# Patient Record
Sex: FEMALE | Race: WHITE | NOT HISPANIC OR LATINO | Employment: OTHER | URBAN - METROPOLITAN AREA
[De-identification: names, ages, dates, MRNs, and addresses within clinical notes are randomized per-mention and may not be internally consistent; named-entity substitution may affect disease eponyms.]

---

## 2017-03-21 ENCOUNTER — PROCEDURE VISIT (OUTPATIENT)
Dept: OBSTETRICS AND GYNECOLOGY | Facility: CLINIC | Age: 67
End: 2017-03-21

## 2017-03-21 VITALS
DIASTOLIC BLOOD PRESSURE: 82 MMHG | SYSTOLIC BLOOD PRESSURE: 130 MMHG | HEIGHT: 62 IN | BODY MASS INDEX: 33.49 KG/M2 | WEIGHT: 182 LBS

## 2017-03-21 DIAGNOSIS — Z79.890 HORMONE REPLACEMENT THERAPY (POSTMENOPAUSAL): ICD-10-CM

## 2017-03-21 DIAGNOSIS — Z12.31 SCREENING MAMMOGRAM, ENCOUNTER FOR: ICD-10-CM

## 2017-03-21 DIAGNOSIS — Z01.419 WOMEN'S ANNUAL ROUTINE GYNECOLOGICAL EXAMINATION: Primary | ICD-10-CM

## 2017-03-21 DIAGNOSIS — E66.9 OBESITY (BMI 30.0-34.9): ICD-10-CM

## 2017-03-21 PROBLEM — Z90.710 HISTORY OF TOTAL ABDOMINAL HYSTERECTOMY AND BILATERAL SALPINGO-OOPHORECTOMY: Status: ACTIVE | Noted: 2017-03-21

## 2017-03-21 PROBLEM — Z90.79 HISTORY OF TOTAL ABDOMINAL HYSTERECTOMY AND BILATERAL SALPINGO-OOPHORECTOMY: Status: ACTIVE | Noted: 2017-03-21

## 2017-03-21 PROBLEM — Z90.722 HISTORY OF TOTAL ABDOMINAL HYSTERECTOMY AND BILATERAL SALPINGO-OOPHORECTOMY: Status: ACTIVE | Noted: 2017-03-21

## 2017-03-21 PROBLEM — E66.811 OBESITY (BMI 30.0-34.9): Status: ACTIVE | Noted: 2017-03-21

## 2017-03-21 PROCEDURE — G0101 CA SCREEN;PELVIC/BREAST EXAM: HCPCS | Performed by: OBSTETRICS & GYNECOLOGY

## 2017-03-21 RX ORDER — LOSARTAN POTASSIUM AND HYDROCHLOROTHIAZIDE 25; 100 MG/1; MG/1
1 TABLET ORAL DAILY
COMMUNITY

## 2017-03-21 RX ORDER — DILTIAZEM HYDROCHLORIDE 300 MG/1
300 CAPSULE, COATED, EXTENDED RELEASE ORAL DAILY
COMMUNITY

## 2017-03-21 RX ORDER — ATENOLOL 50 MG/1
50 TABLET ORAL DAILY
COMMUNITY

## 2017-03-21 RX ORDER — PRAVASTATIN SODIUM 40 MG
40 TABLET ORAL DAILY
COMMUNITY
End: 2017-11-29 | Stop reason: DRUGHIGH

## 2017-03-21 RX ORDER — SOLIFENACIN SUCCINATE 5 MG/1
5 TABLET, FILM COATED ORAL AS NEEDED
COMMUNITY
End: 2020-03-12

## 2017-03-21 RX ORDER — SULFAMETHOXAZOLE AND TRIMETHOPRIM 800; 160 MG/1; MG/1
1 TABLET ORAL 2 TIMES DAILY
COMMUNITY
End: 2018-06-05

## 2017-03-21 RX ORDER — RIZATRIPTAN BENZOATE 5 MG/1
5 TABLET, ORALLY DISINTEGRATING ORAL ONCE AS NEEDED
COMMUNITY

## 2017-03-21 RX ORDER — DESOXIMETASONE 2.5 MG/G
1 OINTMENT TOPICAL 2 TIMES DAILY
COMMUNITY
End: 2020-03-12

## 2017-03-21 RX ORDER — LANSOPRAZOLE 30 MG/1
30 CAPSULE, DELAYED RELEASE ORAL DAILY
COMMUNITY

## 2017-03-21 NOTE — PROGRESS NOTES
"Subjective     Chief Complaint   Patient presents with   • Gynecologic Exam     annual pap medicare       Maggie Emery is a 67 y.o. year old  presenting to be seen for her annual exam.      She is not sexually active.  In the past 12 months there have not been new sexual partners.  Condoms are not typically used.  She would not like to be screened for STD's at today's exam.     She exercises regularly: no.  She wears her seat belt: yes.  She has concerns about domestic violence: no.  She has noticed changes in height: no    GYN screening history:  · Last pap: she reports her last PAP was normal  · Last DEXA: she reports her last DEXA was normal.    No Additional Complaints Reported    The following portions of the patient's history were reviewed and updated as appropriate:vital signs, allergies, current medications, past medical history, past social history, past surgical history and problem list.    Review of Systems  Constitutional: negative for fever, chills, activity change, appetite change, fatigue and unexpected weight change.  Ears, nose, mouth, throat, and face: negative  Respiratory: negative  Cardiovascular: negative  Gastrointestinal: negative  Genitourinary:negative  Integument/breast: negative     Physical Exam    Objective     Visit Vitals   • /82   • Ht 61.5\" (156.2 cm)   • Wt 182 lb (82.6 kg)   • LMP  (Approximate)   • Breastfeeding No   • BMI 33.83 kg/m2       General:  well developed; well nourished  no acute distress  obese - Body mass index is 33.83 kg/(m^2).   Skin:  No suspicious lesions seen   Thyroid: normal to inspection and palpation   Lungs:  breathing is unlabored  clear to auscultation bilaterally   Heart:  regular rate and rhythm, S1, S2 normal, no murmur, click, rub or gallop   Breasts:  Examined in supine position  Symmetric without masses or skin dimpling  Nipples normal without inversion, lesions or discharge  There are no palpable axillary nodes   Abdomen: soft, " non-tender; no masses  no umbilical or inginual hernias are present  no hepato-splenomegaly   Pelvis: Clinical staff was present for exam  External genitalia:  normal appearance of the external genitalia including Bartholin's and [place]'s glands.  :  urethral meatus normal; urethral hypermobility is absent.  Vaginal:  normal pink mucosa without prolapse or lesions.  Cervix:  absent.  Uterus:  absent.  Adnexa:  normal bimanual exam of the adnexa.         Lab Review   No data reviewed    Imaging  DEXA  Mammogram report    Assessment/Plan     ASSESSMENT  1. Women's annual routine gynecological examination    2. Hormone replacement therapy (postmenopausal)    3. Screening mammogram, encounter for    4. Obesity (BMI 30.0-34.9)        PLAN  Orders Placed This Encounter   Procedures   • Mammo Screening Digital Tomosynthesis Bilateral With CAD     Standing Status:   Future     Standing Expiration Date:   3/21/2018     Order Specific Question:   Reason for Exam:     Answer:   screen     New Medications Ordered This Visit   Medications   • Potassium Chloride (KCL-20 PO)     Sig: Take 20 tablets by mouth Daily.   • losartan-hydrochlorothiazide (HYZAAR) 100-25 MG per tablet     Sig: Take 1 tablet by mouth Daily.   • pravastatin (PRAVACHOL) 40 MG tablet     Sig: Take 40 mg by mouth Daily.   • atenolol (TENORMIN) 50 MG tablet     Sig: Take 50 mg by mouth Daily.   • diltiaZEM CD (CARTIA XT) 300 MG 24 hr capsule     Sig: Take 300 mg by mouth Daily.   • fluticasone (VERAMYST) 27.5 MCG/SPRAY nasal spray     Si sprays into each nostril Daily.   • rizatriptan MLT (MAXALT-MLT) 5 MG disintegrating tablet     Sig: Take 5 mg by mouth 1 (One) Time As Needed for migraine. May repeat in 2 hours if needed   • lansoprazole (PREVACID) 30 MG capsule     Sig: Take 30 mg by mouth Daily.   • sulfamethoxazole-trimethoprim (BACTRIM DS,SEPTRA DS) 800-160 MG per tablet     Sig: Take 1 tablet by mouth 2 (Two) Times a Day.   • desoximetasone  (TOPICORT) 0.25 % ointment     Sig: Apply 1 application topically 2 (Two) Times a Day.   • solifenacin (VESICARE) 5 MG tablet     Sig: Take 5 mg by mouth As Needed for bladder spasms.   • esterified estrogens (MENEST) 0.625 MG tablet     Sig: Take 1 tablet by mouth Daily.     Dispense:  90 tablet     Refill:  3         Follow up: 1 year(s)         This note was electronically signed.    Daniel Ziegler MD  March 21, 2017

## 2017-03-22 ENCOUNTER — TRANSCRIBE ORDERS (OUTPATIENT)
Dept: OBSTETRICS AND GYNECOLOGY | Facility: CLINIC | Age: 67
End: 2017-03-22

## 2017-03-22 DIAGNOSIS — R92.8 ABNORMAL MAMMOGRAM: Primary | ICD-10-CM

## 2017-04-06 ENCOUNTER — TRANSCRIBE ORDERS (OUTPATIENT)
Dept: OBSTETRICS AND GYNECOLOGY | Facility: CLINIC | Age: 67
End: 2017-04-06

## 2017-04-06 ENCOUNTER — HOSPITAL ENCOUNTER (OUTPATIENT)
Dept: MAMMOGRAPHY | Facility: HOSPITAL | Age: 67
Discharge: HOME | End: 2017-04-06
Attending: OBSTETRICS & GYNECOLOGY | Admitting: OBSTETRICS & GYNECOLOGY

## 2017-04-06 DIAGNOSIS — R92.8 ABNORMAL MAMMOGRAM: Primary | ICD-10-CM

## 2017-04-06 DIAGNOSIS — R92.8 ABNORMAL MAMMOGRAM: ICD-10-CM

## 2017-04-06 PROCEDURE — G0279 TOMOSYNTHESIS, MAMMO: HCPCS | Performed by: RADIOLOGY

## 2017-04-06 PROCEDURE — G0206 DX MAMMO INCL CAD UNI: HCPCS

## 2017-04-06 PROCEDURE — G0206 DX MAMMO INCL CAD UNI: HCPCS | Performed by: RADIOLOGY

## 2017-04-06 PROCEDURE — G0279 TOMOSYNTHESIS, MAMMO: HCPCS

## 2017-04-10 NOTE — TELEPHONE ENCOUNTER
Patients medication has not arrived from [facility] yet and she is out of her Lunesta.  Will you call her in only a 7 day supply?  Her next appointment is 06-05-17.

## 2017-04-11 RX ORDER — ESZOPICLONE 3 MG/1
3 TABLET, FILM COATED ORAL NIGHTLY
Qty: 10 TABLET | Refills: 0 | Status: SHIPPED | OUTPATIENT
Start: 2017-04-11 | End: 2017-06-12 | Stop reason: SDUPTHER

## 2017-06-12 RX ORDER — ESZOPICLONE 3 MG/1
3 TABLET, FILM COATED ORAL NIGHTLY
Qty: 90 TABLET | Refills: 0 | Status: SHIPPED | OUTPATIENT
Start: 2017-06-12 | End: 2017-06-28 | Stop reason: SDUPTHER

## 2017-06-12 RX ORDER — DULOXETIN HYDROCHLORIDE 60 MG/1
60 CAPSULE, DELAYED RELEASE ORAL DAILY
Qty: 90 CAPSULE | Refills: 0 | Status: SHIPPED | OUTPATIENT
Start: 2017-06-12 | End: 2017-08-01 | Stop reason: SDUPTHER

## 2017-07-02 RX ORDER — ESZOPICLONE 3 MG/1
3 TABLET, FILM COATED ORAL NIGHTLY PRN
Qty: 10 TABLET | Refills: 0 | Status: SHIPPED | OUTPATIENT
Start: 2017-07-02 | End: 2017-08-01 | Stop reason: SDUPTHER

## 2017-07-02 RX ORDER — ESZOPICLONE 3 MG/1
3 TABLET, FILM COATED ORAL NIGHTLY
Qty: 90 TABLET | Refills: 0 | Status: SHIPPED | OUTPATIENT
Start: 2017-07-02 | End: 2017-08-01 | Stop reason: SDUPTHER

## 2017-07-03 ENCOUNTER — TELEPHONE (OUTPATIENT)
Dept: PSYCHIATRY | Facility: CLINIC | Age: 67
End: 2017-07-03

## 2017-07-03 NOTE — TELEPHONE ENCOUNTER
Called [facility],Lunesta 3 mg #10 no refills, this is to keep patient from running out of medication. Scripts are sent to mail order and takes 10 days for pt to get meds.

## 2017-07-07 ENCOUNTER — HOSPITAL ENCOUNTER (OUTPATIENT)
Dept: MAMMOGRAPHY | Facility: HOSPITAL | Age: 67
Discharge: HOME | End: 2017-07-07
Attending: OBSTETRICS & GYNECOLOGY | Admitting: OBSTETRICS & GYNECOLOGY

## 2017-07-07 DIAGNOSIS — R92.8 ABNORMAL MAMMOGRAM: ICD-10-CM

## 2017-07-07 PROCEDURE — G0206 DX MAMMO INCL CAD UNI: HCPCS

## 2017-07-07 PROCEDURE — G0206 DX MAMMO INCL CAD UNI: HCPCS | Performed by: RADIOLOGY

## 2017-08-01 ENCOUNTER — OFFICE VISIT (OUTPATIENT)
Dept: PSYCHIATRY | Facility: CLINIC | Age: 67
End: 2017-08-01

## 2017-08-01 VITALS
WEIGHT: 180 LBS | BODY MASS INDEX: 33.99 KG/M2 | DIASTOLIC BLOOD PRESSURE: 74 MMHG | HEART RATE: 67 BPM | SYSTOLIC BLOOD PRESSURE: 110 MMHG | HEIGHT: 61 IN

## 2017-08-01 DIAGNOSIS — F34.1 DYSTHYMIC DISORDER: Primary | ICD-10-CM

## 2017-08-01 PROCEDURE — 99213 OFFICE O/P EST LOW 20 MIN: CPT | Performed by: PSYCHIATRY & NEUROLOGY

## 2017-08-01 RX ORDER — DULOXETIN HYDROCHLORIDE 60 MG/1
60 CAPSULE, DELAYED RELEASE ORAL DAILY
Qty: 90 CAPSULE | Refills: 0 | Status: SHIPPED | OUTPATIENT
Start: 2017-08-01 | End: 2017-11-09 | Stop reason: SDUPTHER

## 2017-08-01 RX ORDER — ESZOPICLONE 3 MG/1
3 TABLET, FILM COATED ORAL NIGHTLY
Qty: 90 TABLET | Refills: 0 | Status: SHIPPED | OUTPATIENT
Start: 2017-08-01 | End: 2017-11-29 | Stop reason: SDUPTHER

## 2017-08-01 NOTE — PROGRESS NOTES
"Patient ID: Maggie Emery is a 67 y.o. female    SERVICE TYPE: EVALUATION AND MANAGEMENT (greater than 50% of the time spent for supportive psychotherapy).      /74  Pulse 67  Ht 61\" (154.9 cm)  Wt 180 lb (81.6 kg)  LMP  (Approximate)  BMI 34.01 kg/m2    ALLERGIES:  Darvon [propoxyphene]    CC: \"Been find\".     FOLLOWED FOR: Depression.     HPI: sounds like she working around some difficulty family situations, enjoys working in their garden and their 3 dogs and 2 cats.\"No more depressed than usual\". Struggles with her  and his drinking. Engaged with her children as much as possible with them being at a distance.     PFSH: worries about her sister with HNP - post op psychosis, lives in [place]. Calling the patient at night and disruptive to her sleep. Her  continues to drink, is worse - more difficult for her to work around this.     Review of Systems   Respiratory: Negative.    Cardiovascular: Negative.    Gastrointestinal: Negative.        SUPPORTIVE PSYCHOTHERAPY: continuing efforts to promote the therapeutic alliance, address the patient’s issues, and strengthen self awareness, insights, and coping skills.       Mental Status Exam  Appearance:  clean and casually dressed, appropriate  Attitude toward clinician:  cooperative and agreeable   Speech:    Rate:  regular rate and rhythm   Volume: normal  Motor:  no abnormal movements present  Mood:  Good  Affect:  euthymic  Thought Processes:  linear, logical, and goal directed  Thought Content:  Normal   Feeling Hopeless: absent  Suicidal Thoughts:  absent  Homicidal Thoughts:  absent  Perceptual Disturbance: no perceptual disturbance  Attention and Concentration:  good  Insight and Judgement:  good  Memory:  memory appears to be intact    LABS:   Lab Results (last 7 days)     ** No results found for the last 168 hours. **          MEDICATION ISSUES: Have discussed with the patient the medications Risks, Benefits, and Side effects including " potential falls, possible impaired driving and  metabolic adversities among others. No medication side effects or related complaints today.     TREATMENT PLAN/GOALS: Continue supportive psychotherapy efforts and medications as indicated.     Current Outpatient Prescriptions   Medication Sig Dispense Refill   • atenolol (TENORMIN) 50 MG tablet Take 50 mg by mouth Daily.     • desoximetasone (TOPICORT) 0.25 % ointment Apply 1 application topically 2 (Two) Times a Day.     • diltiaZEM CD (CARTIA XT) 300 MG 24 hr capsule Take 300 mg by mouth Daily.     • DULoxetine (CYMBALTA) 60 MG capsule Take 1 capsule by mouth Daily. 90 capsule 0   • esterified estrogens (MENEST) 0.625 MG tablet Take 1 tablet by mouth Daily. 90 tablet 3   • eszopiclone (LUNESTA) 3 MG tablet Take 1 tablet by mouth Every Night. Take immediately before bedtime 90 tablet 0   • fluticasone (VERAMYST) 27.5 MCG/SPRAY nasal spray 2 sprays into each nostril Daily.     • lansoprazole (PREVACID) 30 MG capsule Take 30 mg by mouth Daily.     • losartan-hydrochlorothiazide (HYZAAR) 100-25 MG per tablet Take 1 tablet by mouth Daily.     • Potassium Chloride (KCL-20 PO) Take 20 tablets by mouth Daily.     • pravastatin (PRAVACHOL) 40 MG tablet Take 40 mg by mouth Daily.     • rizatriptan MLT (MAXALT-MLT) 5 MG disintegrating tablet Take 5 mg by mouth 1 (One) Time As Needed for migraine. May repeat in 2 hours if needed     • solifenacin (VESICARE) 5 MG tablet Take 5 mg by mouth As Needed for bladder spasms.     • sulfamethoxazole-trimethoprim (BACTRIM DS,SEPTRA DS) 800-160 MG per tablet Take 1 tablet by mouth 2 (Two) Times a Day.       No current facility-administered medications for this visit.        COLLATERAL PSYCHOTHERAPEUTIC INTERVENTION: patient not interested in additional psychotherapy.     Encounter Diagnosis   Name Primary?   • Dysthymic disorder Yes       Return in about 3 months (around 11/1/2017).  Patient knows to call if symptoms worsen or fail to  improve between appointments.

## 2017-11-09 RX ORDER — DULOXETIN HYDROCHLORIDE 60 MG/1
CAPSULE, DELAYED RELEASE ORAL
Qty: 90 CAPSULE | Refills: 0 | Status: SHIPPED | OUTPATIENT
Start: 2017-11-09 | End: 2017-11-29 | Stop reason: SDUPTHER

## 2017-11-29 ENCOUNTER — OFFICE VISIT (OUTPATIENT)
Dept: PSYCHIATRY | Facility: CLINIC | Age: 67
End: 2017-11-29

## 2017-11-29 VITALS
DIASTOLIC BLOOD PRESSURE: 85 MMHG | HEART RATE: 64 BPM | SYSTOLIC BLOOD PRESSURE: 132 MMHG | WEIGHT: 175 LBS | BODY MASS INDEX: 33.04 KG/M2 | HEIGHT: 61 IN

## 2017-11-29 DIAGNOSIS — F34.1 DYSTHYMIC DISORDER: Primary | ICD-10-CM

## 2017-11-29 PROCEDURE — 99213 OFFICE O/P EST LOW 20 MIN: CPT | Performed by: PSYCHIATRY & NEUROLOGY

## 2017-11-29 RX ORDER — PRAVASTATIN SODIUM 80 MG/1
TABLET ORAL DAILY
COMMUNITY
Start: 2017-11-16

## 2017-11-29 RX ORDER — OLOPATADINE HYDROCHLORIDE 1 MG/ML
SOLUTION/ DROPS OPHTHALMIC
COMMUNITY
Start: 2017-11-22 | End: 2018-06-05

## 2017-11-29 RX ORDER — ESZOPICLONE 3 MG/1
3 TABLET, FILM COATED ORAL NIGHTLY
Qty: 90 TABLET | Refills: 1 | Status: SHIPPED | OUTPATIENT
Start: 2017-11-29 | End: 2018-06-05 | Stop reason: SDUPTHER

## 2017-11-29 RX ORDER — DULOXETIN HYDROCHLORIDE 60 MG/1
60 CAPSULE, DELAYED RELEASE ORAL DAILY
Qty: 90 CAPSULE | Refills: 1 | Status: SHIPPED | OUTPATIENT
Start: 2017-11-29 | End: 2018-06-05 | Stop reason: SDUPTHER

## 2017-11-29 RX ORDER — POTASSIUM CHLORIDE 1500 MG/1
TABLET, EXTENDED RELEASE ORAL
COMMUNITY
Start: 2017-09-18 | End: 2020-03-12

## 2017-11-29 NOTE — PROGRESS NOTES
"Patient ID: Maggie Emery is a 67 y.o. female    SERVICE TYPE: EVALUATION AND MANAGEMENT (greater than 50% of the time spent for supportive psychotherapy).      /85  Pulse 64  Ht 61\" (154.9 cm)  Wt 175 lb (79.4 kg)  BMI 33.07 kg/m2    ALLERGIES:  Darvon [propoxyphene]    CC: \"    FOLLOWED FOR: Depression.     HPI: Walking several times each day, weight down 12 lbs, low CHO diet - Depression is better, interest, activities normal. Occasional sleepless night, most nights sleeping 5-6 hours.       PFSH: Enjoyed 6 weeks with her daughter and her new baby in [place], gives her a brake from the home stress (ETOH spouse). Here  control all the family finance. Visit first ALION meeting, will hopefully continue.     Review of Systems   Respiratory: Negative.    Cardiovascular: Negative.    Gastrointestinal: Negative.    Musculoskeletal:        Foot pain.    Also Sleep Apnea, uses CPAP.     SUPPORTIVE PSYCHOTHERAPY: continuing efforts to promote the therapeutic alliance, address the patient’s issues, and strengthen self awareness, insights, and coping skills.       Mental Status Exam  Appearance:  clean and casually dressed, appropriate  Attitude toward clinician:  cooperative and agreeable   Speech:    Rate:  regular rate and rhythm   Volume: normal  Motor:  no abnormal movements present  Mood:  Good  Affect:  euthymic  Thought Processes:  linear, logical, and goal directed  Thought Content:  Normal   Feeling Hopeless: absent  Suicidal Thoughts:  absent  Homicidal Thoughts:  absent  Perceptual Disturbance: no perceptual disturbance  Attention and Concentration:  good  Insight and Judgement:  good  Memory:  memory appears to be intact    LABS: No results found for this or any previous visit (from the past 168 hour(s)).    MEDICATION ISSUES: Have discussed with the patient the medications Risks, Benefits, and Side effects including potential falls, possible impaired driving and  metabolic adversities " among others. No medication side effects or related complaints today.     TREATMENT PLAN/GOALS: Continue supportive psychotherapy efforts and medications as indicated.     Current Outpatient Prescriptions   Medication Sig Dispense Refill   • atenolol (TENORMIN) 50 MG tablet Take 50 mg by mouth Daily.     • desoximetasone (TOPICORT) 0.25 % ointment Apply 1 application topically 2 (Two) Times a Day.     • diltiaZEM CD (CARTIA XT) 300 MG 24 hr capsule Take 300 mg by mouth Daily.     • DULoxetine (CYMBALTA) 60 MG capsule Take 1 capsule by mouth Daily. 90 capsule 1   • esterified estrogens (MENEST) 0.625 MG tablet Take 1 tablet by mouth Daily. 90 tablet 3   • eszopiclone (LUNESTA) 3 MG tablet Take 1 tablet by mouth Every Night. Take immediately before bedtime 90 tablet 1   • fluticasone (VERAMYST) 27.5 MCG/SPRAY nasal spray 2 sprays into each nostril Daily.     • KLOR-CON 20 MEQ CR tablet      • lansoprazole (PREVACID) 30 MG capsule Take 30 mg by mouth Daily.     • losartan-hydrochlorothiazide (HYZAAR) 100-25 MG per tablet Take 1 tablet by mouth Daily.     • olopatadine (PATANOL) 0.1 % ophthalmic solution      • Potassium Chloride (KCL-20 PO) Take 20 tablets by mouth Daily.     • pravastatin (PRAVACHOL) 80 MG tablet Daily.     • rizatriptan MLT (MAXALT-MLT) 5 MG disintegrating tablet Take 5 mg by mouth 1 (One) Time As Needed for migraine. May repeat in 2 hours if needed     • solifenacin (VESICARE) 5 MG tablet Take 5 mg by mouth As Needed for bladder spasms.     • sulfamethoxazole-trimethoprim (BACTRIM DS,SEPTRA DS) 800-160 MG per tablet Take 1 tablet by mouth 2 (Two) Times a Day.       No current facility-administered medications for this visit.        COLLATERAL PSYCHOTHERAPEUTIC INTERVENTION: patient not interested in additional psychotherapy.     Encounter Diagnosis   Name Primary?   • Dysthymic disorder Yes       Return in about 6 months (around 5/29/2018).  Patient knows to call if symptoms worsen or fail to  improve between appointments.

## 2018-02-01 ENCOUNTER — HOSPITAL ENCOUNTER (OUTPATIENT)
Dept: MAMMOGRAPHY | Facility: HOSPITAL | Age: 68
Discharge: HOME | End: 2018-02-01
Admitting: OBSTETRICS & GYNECOLOGY

## 2018-02-01 DIAGNOSIS — R92.8 ABNORMAL MAMMOGRAM: ICD-10-CM

## 2018-02-01 PROCEDURE — 77066 DX MAMMO INCL CAD BI: CPT | Performed by: RADIOLOGY

## 2018-02-01 PROCEDURE — G0279 TOMOSYNTHESIS, MAMMO: HCPCS

## 2018-02-01 PROCEDURE — G0279 TOMOSYNTHESIS, MAMMO: HCPCS | Performed by: RADIOLOGY

## 2018-02-01 PROCEDURE — 77066 DX MAMMO INCL CAD BI: CPT

## 2018-05-23 RX ORDER — ESTERIFIED ESTROGENS 0.62 MG/1
TABLET, FILM COATED ORAL
Qty: 90 TABLET | Refills: 3 | Status: SHIPPED | OUTPATIENT
Start: 2018-05-23 | End: 2018-07-03 | Stop reason: SDUPTHER

## 2018-06-05 ENCOUNTER — OFFICE VISIT (OUTPATIENT)
Dept: PSYCHIATRY | Facility: CLINIC | Age: 68
End: 2018-06-05

## 2018-06-05 VITALS
HEART RATE: 65 BPM | HEIGHT: 61 IN | SYSTOLIC BLOOD PRESSURE: 107 MMHG | BODY MASS INDEX: 32.85 KG/M2 | DIASTOLIC BLOOD PRESSURE: 67 MMHG | WEIGHT: 174 LBS

## 2018-06-05 DIAGNOSIS — F34.1 DYSTHYMIC DISORDER: Primary | ICD-10-CM

## 2018-06-05 PROCEDURE — 99214 OFFICE O/P EST MOD 30 MIN: CPT | Performed by: PSYCHIATRY & NEUROLOGY

## 2018-06-05 RX ORDER — DULOXETIN HYDROCHLORIDE 60 MG/1
60 CAPSULE, DELAYED RELEASE ORAL DAILY
Qty: 90 CAPSULE | Refills: 1 | Status: SHIPPED | OUTPATIENT
Start: 2018-06-05 | End: 2018-09-17 | Stop reason: SDUPTHER

## 2018-06-05 RX ORDER — ESZOPICLONE 3 MG/1
3 TABLET, FILM COATED ORAL NIGHTLY PRN
Qty: 14 TABLET | Refills: 0 | Status: SHIPPED | OUTPATIENT
Start: 2018-06-05 | End: 2018-12-05 | Stop reason: SDUPTHER

## 2018-06-05 RX ORDER — ESZOPICLONE 3 MG/1
3 TABLET, FILM COATED ORAL NIGHTLY
Qty: 90 TABLET | Refills: 1 | Status: SHIPPED | OUTPATIENT
Start: 2018-06-05 | End: 2018-12-05 | Stop reason: SDUPTHER

## 2018-06-05 RX ORDER — ESZOPICLONE 3 MG/1
3 TABLET, FILM COATED ORAL NIGHTLY
Qty: 90 TABLET | Refills: 1 | Status: SHIPPED | OUTPATIENT
Start: 2018-06-05 | End: 2018-06-05 | Stop reason: SDUPTHER

## 2018-06-05 NOTE — PROGRESS NOTES
"Patient ID: Maggie Emery is a 68 y.o. female    SERVICE TYPE: EVALUATION AND MANAGEMENT (greater than 50% of the time spent for supportive psychotherapy).  Time in 1445        time out 1520    /67   Pulse 65   Ht 154.9 cm (60.98\")   Wt 78.9 kg (174 lb)   BMI 32.89 kg/m²     ALLERGIES:  Ambien [zolpidem tartrate]; Darvon [propoxyphene]; and Hydrocodone    CC: \"Just a few problems, nothing major\"     FOLLOWED FOR:Depression    HPI: Occasional brief periods of depression, nothing that persist > a week or two. Sleeping 4-5 hours/ night with her CPAP. Awakens \"feeling tired\" but improves during the day with activities.  Studying principles of Anion. Doing better limiting reacting to her 's behavior, accepting not her fault. Does exercise and works hard in her garden - recognizes the therapeutic benefits.     [facility] is OK with the Lunesta.      PFSH: big garden, prides herself. Recently visited daughter in [place] enjoying her grand children.  still drinking. Her other daughter  in [place], doing well.   Her son continues to do well.     Review of Systems   Respiratory: Negative.    Cardiovascular: Negative.    Gastrointestinal: Positive for abdominal pain.   Musculoskeletal: Negative.    Neurological: Positive for syncope and headaches.   Has bilateral Cataracts - possible surgery this fall.     SUPPORTIVE PSYCHOTHERAPY: continuing efforts to promote the therapeutic alliance, address the patient’s issues, and strengthen self awareness, insights, and coping skills.  Discussed her home situation and how best to cope and work with the problems.      Mental Status Exam  Appearance:  clean and casually dressed, appropriate  Attitude toward clinician:  cooperative and agreeable   Speech:    Rate:  regular rate and rhythm   Volume: normal  Motor:  no abnormal movements present  Mood:  Good  Affect:  euthymic  Thought Processes:  linear, logical, and goal " directed  Thought Content:  Normal   Feeling Hopeless: absent  Suicidal Thoughts:  absent  Homicidal Thoughts:  absent  Perceptual Disturbance: no perceptual disturbance  Attention and Concentration:  good  Insight and Judgement:  good  Memory:  memory appears to be intact    LABS: No results found for this or any previous visit (from the past 168 hour(s)).    MEDICATION ISSUES: Have discussed with the patient the medications Risks, Benefits, and Side effects including potential falls, possible impaired driving and  metabolic adversities among others. No medication side effects or related complaints today.     TREATMENT PLAN/GOALS: Continue supportive psychotherapy efforts and medications as indicated.     Current Outpatient Prescriptions   Medication Sig Dispense Refill   • atenolol (TENORMIN) 50 MG tablet Take 50 mg by mouth Daily.     • desoximetasone (TOPICORT) 0.25 % ointment Apply 1 application topically 2 (Two) Times a Day.     • diltiaZEM CD (CARTIA XT) 300 MG 24 hr capsule Take 300 mg by mouth Daily.     • DULoxetine (CYMBALTA) 60 MG capsule Take 1 capsule by mouth Daily. 90 capsule 1   • eszopiclone (LUNESTA) 3 MG tablet Take 1 tablet by mouth Every Night. Take immediately before bedtime 90 tablet 1   • fluticasone (VERAMYST) 27.5 MCG/SPRAY nasal spray 2 sprays into each nostril Daily.     • KLOR-CON 20 MEQ CR tablet      • lansoprazole (PREVACID) 30 MG capsule Take 30 mg by mouth Daily.     • losartan-hydrochlorothiazide (HYZAAR) 100-25 MG per tablet Take 1 tablet by mouth Daily.     • MENEST 0.625 MG tablet TAKE 1 TABLET DAILY 90 tablet 3   • Potassium Chloride (KCL-20 PO) Take 20 tablets by mouth Daily.     • pravastatin (PRAVACHOL) 80 MG tablet Daily.     • rizatriptan MLT (MAXALT-MLT) 5 MG disintegrating tablet Take 5 mg by mouth 1 (One) Time As Needed for migraine. May repeat in 2 hours if needed     • eszopiclone (LUNESTA) 3 MG tablet Take 1 tablet by mouth At Night As Needed for Sleep. Take  immediately before bedtime 14 tablet 0   • solifenacin (VESICARE) 5 MG tablet Take 5 mg by mouth As Needed for bladder spasms.       No current facility-administered medications for this visit.        COLLATERAL PSYCHOTHERAPEUTIC INTERVENTION: patient not interested in additional psychotherapy.     Encounter Diagnosis   Name Primary?   • Dysthymic disorder Yes       Return in about 6 months (around 12/5/2018).  Patient knows to call if symptoms worsen or fail to improve between appointments.     Dictated utilizing Dragon dictation

## 2018-09-17 RX ORDER — DULOXETIN HYDROCHLORIDE 60 MG/1
CAPSULE, DELAYED RELEASE ORAL
Qty: 90 CAPSULE | Refills: 1 | Status: SHIPPED | OUTPATIENT
Start: 2018-09-17 | End: 2018-12-05 | Stop reason: SDUPTHER

## 2018-12-05 ENCOUNTER — OFFICE VISIT (OUTPATIENT)
Dept: PSYCHIATRY | Facility: CLINIC | Age: 68
End: 2018-12-05

## 2018-12-05 VITALS
HEIGHT: 61 IN | SYSTOLIC BLOOD PRESSURE: 115 MMHG | WEIGHT: 182 LBS | HEART RATE: 71 BPM | DIASTOLIC BLOOD PRESSURE: 76 MMHG | BODY MASS INDEX: 34.36 KG/M2

## 2018-12-05 DIAGNOSIS — F34.1 DYSTHYMIC DISORDER: Primary | ICD-10-CM

## 2018-12-05 PROCEDURE — 99213 OFFICE O/P EST LOW 20 MIN: CPT | Performed by: PSYCHIATRY & NEUROLOGY

## 2018-12-05 RX ORDER — ESZOPICLONE 3 MG/1
3 TABLET, FILM COATED ORAL NIGHTLY
Qty: 90 TABLET | Refills: 1 | Status: SHIPPED | OUTPATIENT
Start: 2018-12-05 | End: 2019-04-26 | Stop reason: SDUPTHER

## 2018-12-05 RX ORDER — DULOXETIN HYDROCHLORIDE 60 MG/1
60 CAPSULE, DELAYED RELEASE ORAL DAILY
Qty: 90 CAPSULE | Refills: 1 | Status: SHIPPED | OUTPATIENT
Start: 2018-12-05 | End: 2019-04-26 | Stop reason: SDUPTHER

## 2018-12-05 RX ORDER — ESZOPICLONE 3 MG/1
3 TABLET, FILM COATED ORAL NIGHTLY PRN
Qty: 14 TABLET | Refills: 0 | Status: SHIPPED | OUTPATIENT
Start: 2018-12-05 | End: 2019-12-05

## 2018-12-05 NOTE — PROGRESS NOTES
"Patient ID: Maggie Emery is a 68 y.o. female    SERVICE TYPE: EVALUATION AND MANAGEMENT (greater than 50% of the time spent for supportive psychotherapy).      /76   Pulse 71   Ht 154.9 cm (60.98\")   Wt 82.6 kg (182 lb)   BMI 34.41 kg/m²     ALLERGIES:  Ambien [zolpidem tartrate]; Darvon [propoxyphene]; and Hydrocodone    CC/ Focus of the visit: Depression. :     HPI: Reports \"I'm OK\". No change in her home situation, 's drinking gradually worsening. Planning projects for the winter. Trying to be mindful of her obsessional tendencies, Does a fairly successful job coping with the home situation, focuses on her children and grandchildren. No significant persistent episodes of major depressive symptoms.  Dysthymia continues.    PFSH: Planning visit to daughter's in [place]     Review of Systems   Respiratory: Negative.    Cardiovascular: Negative.       Bilateral cataract surgery.   Using CPAP.     SUPPORTIVE PSYCHOTHERAPY: continuing efforts to promote the therapeutic alliance, address the patient’s issues, and strengthen self awareness, insights, and coping skills.       Mental Status Exam  Appearance:  clean and casually dressed, appropriate  Attitude toward clinician:  cooperative and agreeable   Speech:    Rate:  regular rate and rhythm   Volume: normal  Motor:  no abnormal movements present  Mood:  Low-grade dysphoria continues  Affect:  Euthymic Today  Thought Processes:  linear, logical, and goal directed  Thought Content:  Normal   Feeling Hopeless: absent  Suicidal Thoughts:  absent  Homicidal Thoughts:  absent  Perceptual Disturbance: no perceptual disturbance  Attention and Concentration:  good  Insight and Judgement:  good  Memory:  memory appears to be intact    LABS: No results found for this or any previous visit (from the past 168 hour(s)).    MEDICATION ISSUES: Have discussed with the patient the medications Risks, Benefits, and Side effects including potential falls, possible impaired " driving and  metabolic adversities among others. No medication side effects or related complaints today.     TREATMENT PLAN/GOALS: Continue supportive psychotherapy efforts and medications as indicated.     Current Outpatient Medications   Medication Sig Dispense Refill   • atenolol (TENORMIN) 50 MG tablet Take 50 mg by mouth Daily.     • desoximetasone (TOPICORT) 0.25 % ointment Apply 1 application topically 2 (Two) Times a Day.     • diltiaZEM CD (CARTIA XT) 300 MG 24 hr capsule Take 300 mg by mouth Daily.     • DULoxetine (CYMBALTA) 60 MG capsule TAKE 1 CAPSULE DAILY 90 capsule 1   • esterified estrogens (MENEST) 0.625 MG tablet Take 1 tablet by mouth Daily.     • eszopiclone (LUNESTA) 3 MG tablet Take 1 tablet by mouth At Night As Needed for Sleep. Take immediately before bedtime 14 tablet 0   • eszopiclone (LUNESTA) 3 MG tablet Take 1 tablet by mouth Every Night. Take immediately before bedtime 90 tablet 1   • fluticasone (VERAMYST) 27.5 MCG/SPRAY nasal spray 2 sprays into each nostril Daily.     • KLOR-CON 20 MEQ CR tablet      • lansoprazole (PREVACID) 30 MG capsule Take 30 mg by mouth Daily.     • losartan-hydrochlorothiazide (HYZAAR) 100-25 MG per tablet Take 1 tablet by mouth Daily.     • Potassium Chloride (KCL-20 PO) Take 20 tablets by mouth Daily.     • pravastatin (PRAVACHOL) 80 MG tablet Daily.     • rizatriptan MLT (MAXALT-MLT) 5 MG disintegrating tablet Take 5 mg by mouth 1 (One) Time As Needed for migraine. May repeat in 2 hours if needed     • solifenacin (VESICARE) 5 MG tablet Take 5 mg by mouth As Needed for bladder spasms.       No current facility-administered medications for this visit.    Patient needs a 2 week supply of Lunesta due to travel plans.    COLLATERAL PSYCHOTHERAPEUTIC INTERVENTION: patient not interested in additional psychotherapy.    RISK:  low    Encounter Diagnosis   Name Primary?   • Dysthymic disorder Yes       Return in about 6 months (around 6/5/2019).  Patient knows to  call if symptoms worsen or fail to improve between appointments.     Dictated utilizing Dragon dictation

## 2019-01-15 ENCOUNTER — TRANSCRIBE ORDERS (OUTPATIENT)
Dept: ADMINISTRATIVE | Facility: HOSPITAL | Age: 69
End: 2019-01-15

## 2019-01-15 DIAGNOSIS — Z12.31 VISIT FOR SCREENING MAMMOGRAM: Primary | ICD-10-CM

## 2019-01-15 DIAGNOSIS — R92.8 ABNORMAL MAMMOGRAM: ICD-10-CM

## 2019-04-22 ENCOUNTER — HOSPITAL ENCOUNTER (OUTPATIENT)
Dept: MAMMOGRAPHY | Facility: HOSPITAL | Age: 69
Discharge: HOME | End: 2019-04-22
Attending: OBSTETRICS & GYNECOLOGY | Admitting: OBSTETRICS & GYNECOLOGY

## 2019-04-22 DIAGNOSIS — R92.8 ABNORMAL MAMMOGRAM: ICD-10-CM

## 2019-04-22 PROCEDURE — 77066 DX MAMMO INCL CAD BI: CPT | Performed by: RADIOLOGY

## 2019-04-22 PROCEDURE — G0279 TOMOSYNTHESIS, MAMMO: HCPCS

## 2019-04-22 PROCEDURE — 77066 DX MAMMO INCL CAD BI: CPT

## 2019-04-22 PROCEDURE — G0279 TOMOSYNTHESIS, MAMMO: HCPCS | Performed by: RADIOLOGY

## 2019-04-25 RX ORDER — DULOXETIN HYDROCHLORIDE 60 MG/1
60 CAPSULE, DELAYED RELEASE ORAL DAILY
Qty: 90 CAPSULE | Refills: 1 | OUTPATIENT
Start: 2019-04-25

## 2019-04-26 ENCOUNTER — TELEPHONE (OUTPATIENT)
Dept: PSYCHIATRY | Facility: CLINIC | Age: 69
End: 2019-04-26

## 2019-04-26 RX ORDER — DULOXETIN HYDROCHLORIDE 60 MG/1
60 CAPSULE, DELAYED RELEASE ORAL DAILY
Qty: 14 CAPSULE | Refills: 0 | Status: SHIPPED | OUTPATIENT
Start: 2019-04-26 | End: 2019-05-21 | Stop reason: SDUPTHER

## 2019-04-26 RX ORDER — ESZOPICLONE 3 MG/1
3 TABLET, FILM COATED ORAL NIGHTLY
Qty: 14 TABLET | Refills: 0 | Status: SHIPPED | OUTPATIENT
Start: 2019-04-26 | End: 2019-05-21 | Stop reason: SDUPTHER

## 2019-04-26 NOTE — TELEPHONE ENCOUNTER
Patient's usual pharmacy is [facility] and they sent her refills out late so she is out and will not get refills for another week or two. Can you send in a week or two supply to [facility] for her 2 medications?

## 2019-05-21 ENCOUNTER — OFFICE VISIT (OUTPATIENT)
Dept: PSYCHIATRY | Facility: CLINIC | Age: 69
End: 2019-05-21

## 2019-05-21 VITALS
HEART RATE: 60 BPM | HEIGHT: 61 IN | BODY MASS INDEX: 34.93 KG/M2 | SYSTOLIC BLOOD PRESSURE: 121 MMHG | WEIGHT: 185 LBS | DIASTOLIC BLOOD PRESSURE: 77 MMHG

## 2019-05-21 DIAGNOSIS — F34.1 DYSTHYMIC DISORDER: Primary | ICD-10-CM

## 2019-05-21 PROCEDURE — 99213 OFFICE O/P EST LOW 20 MIN: CPT | Performed by: PSYCHIATRY & NEUROLOGY

## 2019-05-21 RX ORDER — DULOXETIN HYDROCHLORIDE 60 MG/1
60 CAPSULE, DELAYED RELEASE ORAL DAILY
Qty: 90 CAPSULE | Refills: 0 | Status: SHIPPED | OUTPATIENT
Start: 2019-05-21 | End: 2019-09-27 | Stop reason: SDUPTHER

## 2019-05-21 RX ORDER — ESZOPICLONE 3 MG/1
3 TABLET, FILM COATED ORAL NIGHTLY
Qty: 90 TABLET | Refills: 1 | Status: SHIPPED | OUTPATIENT
Start: 2019-05-21 | End: 2019-10-31 | Stop reason: SDUPTHER

## 2019-05-21 RX ORDER — SULFAMETHOXAZOLE AND TRIMETHOPRIM 800; 160 MG/1; MG/1
TABLET ORAL
COMMUNITY
Start: 2019-03-19 | End: 2019-10-31

## 2019-05-21 NOTE — PROGRESS NOTES
"Patient ID: Maggie Emery is a 69 y.o. female    SERVICE TYPE: EVALUATION AND MANAGEMENT (greater than 50% of the time spent for supportive psychotherapy).      /77   Pulse 60   Ht 154.9 cm (60.98\")   Wt 83.9 kg (185 lb)   BMI 34.98 kg/m²     ALLERGIES:  Ambien [zolpidem tartrate]; Darvon [propoxyphene]; and Hydrocodone    CC/ Focus of the visit: Depression/ anxiety/ insomnia     HPI: \"Just the usual stress\",  controlling or tries too, patient resist and pretty much does her own thing and enjoying her grand children. No recurrence of significant persistent.  Depression. Sleeping averaging 5 per night, walking an hour 4 days a week and working in the garden that she finds therapeutic helping to cope with the home situation.     Patient understands the risk involved with continuing the Lunesta with her aging, absolutely no history suggestive of any medication misuse or abuse.  She still has difficulties with sleep, she understands the principles of good sleep hygiene and as noted above she is exercising daily and observing a steady consistent wakeup time.    PFSH: financially secure and coping with the alcoholic .     Review of Systems   Respiratory: Negative.    Cardiovascular: Negative.    Gastrointestinal: Negative.    Neurological: Positive for headaches.   Recent ultrasound and stress test normal.     SUPPORTIVE PSYCHOTHERAPY: continuing efforts to promote the therapeutic alliance, address the patient’s issues, and strengthen self awareness, insights, and coping skills.       Mental Status Exam  Appearance:  clean and casually dressed, appropriate  Attitude toward clinician:  cooperative and agreeable   Speech:    Rate:  regular rate and rhythm   Volume: normal  Motor:  no abnormal movements present  Mood:  Good  Affect:  euthymic  Thought Processes:  linear, logical, and goal directed  Thought Content:  Normal   Feeling Hopeless: absent  Suicidal Thoughts:  absent  Homicidal Thoughts:  " absent  Perceptual Disturbance: no perceptual disturbance  Attention and Concentration:  good  Insight and Judgement:  good  Memory:  memory appears to be intact    LABS: No results found for this or any previous visit (from the past 168 hour(s)).    MEDICATION ISSUES: Have discussed with the patient the medications Risks, Benefits, and Side effects including potential falls, possible impaired driving and  metabolic adversities among others. No medication side effects or related complaints today.     TREATMENT PLAN/GOALS: Continue supportive psychotherapy efforts and medications as indicated.     Current Outpatient Medications   Medication Sig Dispense Refill   • atenolol (TENORMIN) 50 MG tablet Take 50 mg by mouth Daily.     • desoximetasone (TOPICORT) 0.25 % ointment Apply 1 application topically 2 (Two) Times a Day.     • diltiaZEM CD (CARTIA XT) 300 MG 24 hr capsule Take 300 mg by mouth Daily.     • DULoxetine (CYMBALTA) 60 MG capsule Take 1 capsule by mouth Daily. 90 capsule 0   • esterified estrogens (MENEST) 0.625 MG tablet Take 1 tablet by mouth Daily. 90 tablet 3   • eszopiclone (LUNESTA) 3 MG tablet Take 1 tablet by mouth At Night As Needed for Sleep. Take immediately before bedtime 14 tablet 0   • eszopiclone (LUNESTA) 3 MG tablet Take 1 tablet by mouth Every Night. Take immediately before bedtime 90 tablet 1   • fluticasone (VERAMYST) 27.5 MCG/SPRAY nasal spray 2 sprays into each nostril Daily.     • KLOR-CON 20 MEQ CR tablet      • lansoprazole (PREVACID) 30 MG capsule Take 30 mg by mouth Daily.     • losartan-hydrochlorothiazide (HYZAAR) 100-25 MG per tablet Take 1 tablet by mouth Daily.     • Potassium Chloride (KCL-20 PO) Take 20 tablets by mouth Daily.     • pravastatin (PRAVACHOL) 80 MG tablet Daily.     • rizatriptan MLT (MAXALT-MLT) 5 MG disintegrating tablet Take 5 mg by mouth 1 (One) Time As Needed for migraine. May repeat in 2 hours if needed     • sulfamethoxazole-trimethoprim (BACTRIM  DS,SEPTRA DS) 800-160 MG per tablet      • solifenacin (VESICARE) 5 MG tablet Take 5 mg by mouth As Needed for bladder spasms.       No current facility-administered medications for this visit.        COLLATERAL PSYCHOTHERAPEUTIC INTERVENTION:  patient not interested in additional psychotherapy.    RISK:  low    Assessment/Plan     Diagnoses and all orders for this visit:    Dysthymic disorder    Other orders  -     eszopiclone (LUNESTA) 3 MG tablet; Take 1 tablet by mouth Every Night. Take immediately before bedtime  -     DULoxetine (CYMBALTA) 60 MG capsule; Take 1 capsule by mouth Daily.        Return in about 6 months (around 11/21/2019).         Patient knows to call if symptoms worsen or fail to improve between appointments.    Dictated utilizing [employer] dictation    MAKSIM Hill MD

## 2019-09-27 RX ORDER — DULOXETIN HYDROCHLORIDE 60 MG/1
CAPSULE, DELAYED RELEASE ORAL
Qty: 90 CAPSULE | Refills: 0 | Status: SHIPPED | OUTPATIENT
Start: 2019-09-27 | End: 2019-10-31 | Stop reason: SDUPTHER

## 2019-10-31 ENCOUNTER — OFFICE VISIT (OUTPATIENT)
Dept: PSYCHIATRY | Facility: CLINIC | Age: 69
End: 2019-10-31

## 2019-10-31 VITALS
HEIGHT: 61 IN | WEIGHT: 183 LBS | DIASTOLIC BLOOD PRESSURE: 76 MMHG | SYSTOLIC BLOOD PRESSURE: 129 MMHG | HEART RATE: 69 BPM | BODY MASS INDEX: 34.55 KG/M2

## 2019-10-31 DIAGNOSIS — F34.1 DYSTHYMIC DISORDER: Primary | ICD-10-CM

## 2019-10-31 PROCEDURE — 99214 OFFICE O/P EST MOD 30 MIN: CPT | Performed by: PSYCHIATRY & NEUROLOGY

## 2019-10-31 RX ORDER — ESZOPICLONE 3 MG/1
3 TABLET, FILM COATED ORAL NIGHTLY
Qty: 90 TABLET | Refills: 0 | Status: SHIPPED | OUTPATIENT
Start: 2019-10-31 | End: 2020-01-27 | Stop reason: SDUPTHER

## 2019-10-31 RX ORDER — DULOXETIN HYDROCHLORIDE 60 MG/1
60 CAPSULE, DELAYED RELEASE ORAL DAILY
Qty: 90 CAPSULE | Refills: 0 | Status: SHIPPED | OUTPATIENT
Start: 2019-10-31 | End: 2020-01-27 | Stop reason: SDUPTHER
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
## 2019-12-09 ENCOUNTER — OFFICE VISIT (OUTPATIENT)
Dept: PSYCHIATRY | Facility: CLINIC | Age: 69
End: 2019-12-09

## 2019-12-09 DIAGNOSIS — F34.1 DYSTHYMIC DISORDER: ICD-10-CM

## 2019-12-09 DIAGNOSIS — F41.1 GAD (GENERALIZED ANXIETY DISORDER): Primary | ICD-10-CM

## 2019-12-09 PROCEDURE — 90837 PSYTX W PT 60 MINUTES: CPT | Performed by: SOCIAL WORKER
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
## 2020-01-15 ENCOUNTER — OFFICE VISIT (OUTPATIENT)
Dept: PSYCHIATRY | Facility: CLINIC | Age: 70
End: 2020-01-15

## 2020-01-15 DIAGNOSIS — F41.1 GAD (GENERALIZED ANXIETY DISORDER): Primary | ICD-10-CM

## 2020-01-15 DIAGNOSIS — F34.1 DYSTHYMIC DISORDER: ICD-10-CM

## 2020-01-15 PROCEDURE — 90837 PSYTX W PT 60 MINUTES: CPT | Performed by: SOCIAL WORKER

## 2020-01-22 ENCOUNTER — OFFICE VISIT (OUTPATIENT)
Dept: PSYCHIATRY | Facility: CLINIC | Age: 70
End: 2020-01-22

## 2020-01-22 DIAGNOSIS — F34.1 DYSTHYMIC DISORDER: ICD-10-CM

## 2020-01-22 DIAGNOSIS — F41.1 GAD (GENERALIZED ANXIETY DISORDER): Primary | ICD-10-CM

## 2020-01-22 PROCEDURE — 90837 PSYTX W PT 60 MINUTES: CPT | Performed by: SOCIAL WORKER

## 2020-01-22 NOTE — PROGRESS NOTES
"  PROGRESS NOTE  Data:  Maggie Emery came in 1/22/2020 for her regularly scheduled therapy session, with Yumiko Miles, RONALD,CRISTELA from 12:50 pm to 1:50 pm.  Patient reports that she is still listening to [place] meeting but is finding it helpful. She reports that she is making small steps to remember that she cannot fix her husbands drinking.  She shares that this is still  struggles to not have thoughts that there \"Should be something I could do to fix this\".      Clinical Maneuvering/Intervention:  Assisted patient in processing above session content; acknowledged and normalized patient’s thoughts, feelings, and concerns. Allowed patient to freely discuss issues without interruption or judgment. Provided safe, confidential environment to facilitate the development of positive therapeutic relationship and encourage open, honest communication. Continued to discuss challenging her negative thoughts and trying to use.  Encouraged the patient to use the self talk of \"did I because it  can I change it, can I fix it\".  Continue to work on boundaries, codependency and ways to challenge the negative self talk that she is experiencing.  Patient was also encouraged to discuss with her physician or physical therapist what else she can do to improve pain that she is having while walking on the knee that she had surgery on.  Patient was encouraged to find things that keep her active including weeding her yard which she truly enjoys Encouraged pt the importance of keeping all appointments and taking medications as prescribed if prescribed  and calling with any questions or concerns.   Assisted patient in identifying risk factors which would indicate the need for higher level of care including thoughts to harm self or others and/or self-harming behavior and encouraged patient to contact this office, call 911, or present to the nearest emergency room should any of these events occur. Discussed crisis intervention " services and means to access.  Patient adamantly and convincingly denies current suicidal or homicidal ideation or perceptual disturbance.    Assessment     Diagnoses and all orders for this visit:    MIRIAM (generalized anxiety disorder)    Dysthymic disorder        Patient presents for session on time, clean and casually dressed with depressed/anxious mood and congruent affect. No evidence of intoxication, withdrawal, or perceptual disturbance. Patient appears to maintain relative stability as compared to their baseline.  However, patient continues to struggle with anxiety and codependency which continues to cause impairment in important areas of functioning.  A result, they can be reasonably expected to continue to benefit from treatment and would likely be at increased risk for decompensation otherwise. Association’s intact, abstraction intact. Thought process is linear and logical. Speech is clear and coherent. Patient is oriented to person, place, and time. Attention and concentration fair. Insight and judgment fair. Patient reports no current suicidal or homicidal ideation. Patient appears cooperative and agreeable to treatment and appears to begin to develop rapport. Patient does not appear to be malingering.          Psychiatric/Behavioral: Negative for agitation, behavioral problems, confusion, decreased concentration, dysphoric mood, hallucinations, self-injury, sleep disturbance and suicidal ideas. The patient is nervous/anxious. The patient is not hyperactive.      Mental Status Exam:   Hygiene:   good  Cooperation:  Cooperative  Eye Contact:  Good  Psychomotor Behavior:  Appropriate  Affect:  Restricted  Mood: anxious  Speech:  Normal  Thought Process:  Linear  Thought Content:  Normal  Suicidal:  None  Homicidal:  None  Hallucinations:  None  Delusion:  None  Memory:  Intact  Orientation:  Person, Place, Time and Situation  Reliability:  good  Insight:  Fair  Judgement:  Fair  Impulse Control:  no acute  medical issue at this moment  Physical/Medical Issues:  No        Patient's Support Network Includes:  children and extended family    Progress toward goal: Not at goal    Functional Status: Severe impairment    Prognosis: Fair with Ongoing Treatment          Future Appointments       Provider Department Center    2/18/2020 3:00 PM Yumiko Alejandre LCSW [facility] BEHAVIORAL HEALTH     3/3/2020 12:00 PM Yumiko Alejandre LCSW [facility] BEHAVIORAL HEALTH     3/17/2020 12:00 PM Yumiko Alejandre LCSW [facility] BEHAVIORAL HEALTH     4/13/2020 1:00 PM Haim Hill MD [facility] BEHAVIORAL [facility]             Patient will have at least monthly outpatient psychotherapy sessions or earlier if symptoms worsen or fail to improve and pharmacotherapy as scheduled with the focus on improved functioning and coping skills, maintaining stability and avoiding decompensation and the need for a higher level of care.      Patient will adhere to medication regimen as prescribed and report any side effects. Patient will contact this office, call 911 or present to the nearest emergency room should suicidal or homicidal ideations occur. Provide Cognitive Behavioral Therapy and Solution Focused Therapy to improve functioning, maintain stability, and avoid decompensation and the need for higher level of care.     Yumiko Miles LCSW,[facility]

## 2020-01-27 DIAGNOSIS — F34.1 DYSTHYMIC DISORDER: ICD-10-CM

## 2020-01-27 RX ORDER — ESZOPICLONE 3 MG/1
3 TABLET, FILM COATED ORAL NIGHTLY
Qty: 30 TABLET | Refills: 0 | Status: SHIPPED | OUTPATIENT
Start: 2020-01-27 | End: 2020-03-04 | Stop reason: SDUPTHER

## 2020-01-27 RX ORDER — DULOXETIN HYDROCHLORIDE 60 MG/1
60 CAPSULE, DELAYED RELEASE ORAL DAILY
Qty: 90 CAPSULE | Refills: 0 | Status: SHIPPED | OUTPATIENT
Start: 2020-01-27 | End: 2020-04-08 | Stop reason: SDUPTHER

## 2020-01-31 ENCOUNTER — TRANSCRIBE ORDERS (OUTPATIENT)
Dept: ADMINISTRATIVE | Facility: HOSPITAL | Age: 70
End: 2020-01-31

## 2020-01-31 DIAGNOSIS — Z12.31 VISIT FOR SCREENING MAMMOGRAM: Primary | ICD-10-CM

## 2020-03-04 DIAGNOSIS — F34.1 DYSTHYMIC DISORDER: ICD-10-CM

## 2020-03-04 RX ORDER — ESZOPICLONE 3 MG/1
3 TABLET, FILM COATED ORAL NIGHTLY
Qty: 30 TABLET | Refills: 0 | Status: SHIPPED | OUTPATIENT
Start: 2020-03-04 | End: 2020-04-08 | Stop reason: SDUPTHER

## 2020-03-09 ENCOUNTER — TELEPHONE (OUTPATIENT)
Dept: PSYCHIATRY | Facility: CLINIC | Age: 70
End: 2020-03-09

## 2020-03-09 NOTE — TELEPHONE ENCOUNTER
CAN YOU SEND A 10 DAY SUPPLY OF LUNESTA TO [facility]? PATIENT IS OUT AND [facility] HAVE NOT DELIVERED HER RX.

## 2020-03-11 NOTE — TELEPHONE ENCOUNTER
[facility] has not delivered medication to patient yet and they stated it would be 7-10 days so she requested a 10 day supply to be sent to [facility].

## 2020-03-12 ENCOUNTER — OFFICE VISIT (OUTPATIENT)
Dept: OBSTETRICS AND GYNECOLOGY | Facility: CLINIC | Age: 70
End: 2020-03-12

## 2020-03-12 VITALS
SYSTOLIC BLOOD PRESSURE: 130 MMHG | BODY MASS INDEX: 33.86 KG/M2 | HEIGHT: 62 IN | WEIGHT: 184 LBS | DIASTOLIC BLOOD PRESSURE: 82 MMHG

## 2020-03-12 DIAGNOSIS — Z13.820 OSTEOPOROSIS SCREENING: ICD-10-CM

## 2020-03-12 DIAGNOSIS — E89.40 POSTSURGICAL MENOPAUSE: ICD-10-CM

## 2020-03-12 DIAGNOSIS — Z01.419 WOMEN'S ANNUAL ROUTINE GYNECOLOGICAL EXAMINATION: Primary | ICD-10-CM

## 2020-03-12 DIAGNOSIS — F34.1 DYSTHYMIC DISORDER: ICD-10-CM

## 2020-03-12 PROCEDURE — G0101 CA SCREEN;PELVIC/BREAST EXAM: HCPCS | Performed by: OBSTETRICS & GYNECOLOGY

## 2020-03-12 NOTE — TELEPHONE ENCOUNTER
Called the patient's [facility] - did they did not fill the 3.4 Rx so authorize a Rx for 10 3 mg Lunesta, no refill.
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
## 2020-03-13 NOTE — TELEPHONE ENCOUNTER
Pharmacy states I am unable to call in this medication due to the category of drug. Can you send in Rx?

## 2020-03-17 ENCOUNTER — OFFICE VISIT (OUTPATIENT)
Dept: PSYCHIATRY | Facility: CLINIC | Age: 70
End: 2020-03-17

## 2020-03-17 DIAGNOSIS — F34.1 DYSTHYMIC DISORDER: ICD-10-CM

## 2020-03-17 DIAGNOSIS — F41.1 GAD (GENERALIZED ANXIETY DISORDER): Primary | ICD-10-CM

## 2020-03-17 PROCEDURE — 90837 PSYTX W PT 60 MINUTES: CPT | Performed by: SOCIAL WORKER

## 2020-03-17 NOTE — PROGRESS NOTES
"  PROGRESS NOTE  Data:  Maggie Emery came in 3/17/2020 for her regularly scheduled therapy session, with Yumiko Miles, RONALD,CRISTELA from 11:45 am to 12:40 pm.  Patient shares that she continues to have low energy and has just not felt like doing very much.  The patient shares that she has been attending [employer] online and reading out of the daily meditations book and is finding it helpful if not frustrating at times.  She shares anxiety about Coronavirus but is trying not to obsess.  Discussed being able to have her thoughts stop, worrying excessively over things she cannot control, her sister who recently had a stroke and her brother-in-law.  Patient shares about children and the desire to visit them and the hardships that she has financially..  Clinical Maneuvering/Intervention:  Assisted patient in processing above session content; acknowledged and normalized patient’s thoughts, feelings, and concerns. Allowed patient to freely discuss issues without interruption or judgment. Provided safe, confidential environment to facilitate the development of positive therapeutic relationship and encourage open, honest communication.  Continue to work with patient using cognitive behavioral therapy to address her negative thoughts that contribute to anxiety and depression.  Patient acknowledges her tendency to over think things as a way to perhaps control the outcome which she shares is not successful.  We discussed 12 step support and how applying the first step to her life is helping but is also difficult.  Continue to process these intense feelings the patient while introducing Radical acceptance \"accepting what is but not approval\".  Patient thinks this will be helpful as she tries to process intense feelings she has around the first step.  Encouraged pt the importance of keeping all appointments and taking medications as prescribed if prescribed  and calling with any questions or concerns.  Assisted patient " in identifying risk factors which would indicate the need for higher level of care including thoughts to harm self or others and/or self-harming behavior and encouraged patient to contact this office, call 911, or present to the nearest emergency room should any of these events occur. Discussed crisis intervention services and means to access.  Patient adamantly and convincingly denies current suicidal or homicidal ideation or perceptual disturbance.    Assessment     Diagnoses and all orders for this visit:    MIRIAM (generalized anxiety disorder)    Dysthymic disorder        Patient presents for session on time, clean and casually dressed with depressed/anxious mood and congruent affect. No evidence of intoxication, withdrawal, or perceptual disturbance. Patient appears to maintain relative stability as compared to their baseline.  However, patient continues to struggle with anxiety, depression and emotional distress which continues to cause impairment in important areas of functioning.  A result, they can be reasonably expected to continue to benefit from treatment and would likely be at increased risk for decompensation otherwise. Association’s intact, abstraction intact. Thought process is linear and logical. Speech is clear and coherent. Patient is oriented to person, place, and time. Attention and concentration fair. Insight and judgment fair. Patient reports no current suicidal or homicidal ideation. Patient appears cooperative and agreeable to treatment and appears to begin to develop rapport. Patient does not appear to be malingering.        ROS: Patient is positive for anxiety, depression and excessive stress    Mental Status Exam:   Hygiene:   good  Cooperation:  Cooperative  Eye Contact:  Good  Psychomotor Behavior:  Appropriate  Affect:  Appropriate  Mood: depressed  Speech:  Normal  Thought Process:  Linear  Thought Content:  Normal  Suicidal:  None  Homicidal:  None  Hallucinations:  None  Delusion:   None  Memory:  Intact  Orientation:  Person, Place, Time and Situation  Reliability:  fair  Insight:  Fair  Judgement:  Fair  Impulse Control:  Fair  Physical/Medical Issues:  No  acute medical issue      Patient's Support Network Includes:  children and extended family    Progress toward goal: Not at goal    Functional Status: Severe impairment    Prognosis: Fair with Ongoing Treatment            Future Appointments       Provider Department Center    3/17/2020 12:00 PM Yumiko Alejandre LCSW [facility] BEHAVIORAL HEALTH     4/8/2020 2:00 PM Yumiko Alejandre LCSW [facility] BEHAVIORAL HEALTH     4/13/2020 1:00 PM Haim Hill MD [facility] BEHAVIORAL HEALTH     5/6/2020 3:20 PM TAY BR SCREENING [facility] Breast [place] [address]    6/30/2020 1:00 PM TAY BEAU DEXA 1 [facility] DEXA ANJELICA TAY    3/15/2021 1:30 PM Daniel Ziegler MD [facility] OBSTETRICS AND GYNECOLOGY           Patient will have at least monthly outpatient psychotherapy sessions or earlier if symptoms worsen or fail to improve and pharmacotherapy as scheduled with the focus on improved functioning and coping skills, maintaining stability and avoiding decompensation and the need for a higher level of care.      Patient will adhere to medication regimen as prescribed and report any side effects. Patient will contact this office, call 911 or present to the nearest emergency room should suicidal or homicidal ideations occur. Provide Cognitive Behavioral Therapy and Solution Focused Therapy to improve functioning, maintain stability, and avoid decompensation and the need for higher level of care.     Yumiko Miles LCSW,[email or web address],[unfilled][email or web address]

## 2020-04-08 ENCOUNTER — TELEMEDICINE (OUTPATIENT)
Dept: PSYCHIATRY | Facility: CLINIC | Age: 70
End: 2020-04-08

## 2020-04-08 DIAGNOSIS — F34.1 DYSTHYMIC DISORDER: ICD-10-CM

## 2020-04-08 DIAGNOSIS — F41.1 GAD (GENERALIZED ANXIETY DISORDER): Primary | ICD-10-CM

## 2020-04-08 PROCEDURE — 90837 PSYTX W PT 60 MINUTES: CPT | Performed by: SOCIAL WORKER

## 2020-04-08 NOTE — PROGRESS NOTES
"Date of Service: April 8, 2020  Time In: 2:005 pm  Time Out: 2:59 pm    PROGRESS NOTE  Data:  Maggie Emery is a 70 y.o. female who met 1:1 with Yumiko Miles LCSW, LCADC for regularly scheduled Individual Therapy session. This was an audio only enabled teletherapy encounter. Maggie presents for session on time, clean and casually dressed with  without evidence of intoxication, withdrawal, or perceptual disturbance.   She was Open and Engaged.      Chief Compliant: Patient presents with    visit today. Do you consent to use a telephone visit for your behavioral health today? Yes     The provider identified herself and credentialsRONALD and CRISTELA.   The Patient is at home by herself, using  her phone because of problems with video connection. The patient's condition being diagnosed/treated is appropriate for   Telemedicine. The patient gave consent to be seen remotely, and when consent is given they understand that the consent allows for patient identifiable information to be sent to a third party as needed.   She may refuse to be seen remotely at any time. The electronic data is encrypted and password protected, and the patient has been advised of the potential risks to privacy not withstanding such measures.    Interactive Complexity: Interactive Complexity No   HPI:  Expresses frustrations at her  and learning to \"accept\" what is.  She shares sadness about her cat which is likely to have to be put down in the next few days.  Shares ongoing depression Low mood for > 2 weeks, Feelings of guilt/worthlessness and low energy, Anxiety difficulty concentrating, fatigue, racing thoughts, excessive worry, feeling nervious. Onset of symptoms was gradual.  Symptoms are associated with relationship problem that are unchanged since last visit.  Symptoms are aggravated by anxiety, lonely and stress.   Symptoms improve with medication management, therapy, support groups and personal self-care (wellness) Current " rates severity of symptoms, on a scale of 1-10 (10 is the most severe) 5 Context Family and social history was reviewed and is changed since last visit with sister in the hospital on a ventilator, brother- in-law in kidney failure CHF, and her cat likely will  Have to be put soon.     CLINICAL MANEUVERING/INTERVENTION/SUPPORTIVE PSYCHOTHERAPY: Therapist continued to promote the therapeutic alliance, address the patient’s issues, and strengthen self awareness, insights, and coping skills.  Therapist applied CBT/REBT, Cognitive Challenging, Exploration of Coping Skills, Mindfulness Training, Positive Coping Skills, Relaxation/Deep Breathing and Thought Stopping and encouraged she to use positive coping skills such as Listen to music, Playing with a pet, Energy redirection, Spending time in nature, Distraction, Use progressive muscle relaxation, Self Care (Take care of your body in a way that makes you feel good - paint your nails, do your hair, put on a face mask), Walk away (leave a situation that is causing you stress), Use positive self-talk, Take deep breaths, Quiet time and Utilize resources/coping skills.  Therapist allowed Maggie  to freely discuss issues without interruption or judgment. Provided safe, confidential environment to facilitate the development of positive therapeutic relationship and encourage open, honest communication. Assisted patient in identifying increased risk factors which would indicate the need for higher level of care including thoughts to harm self or others, self-harming behavior, and/or binge drinking and encouraged patient to contact this office, call 911, or present to the nearest emergency room should any of these events occur. Discussed crisis intervention services and means to access.         Assessment     Diagnoses and all orders for this visit:    MIRIAM (generalized anxiety disorder)    Dysthymic disorder           Mental Status Exam:    Hygiene:   fair by self  report  Cooperation:  Cooperative  Eye Contact:  by phone  Psychomotor Behavior:  Appropriate  Affect:  Appropriate  Hopelessness: 4  Speech:  Normal  Thought Progress:  Linear  Thought Content:  Normal  Suicidal:  None  Homicidal:  None  Hallucinations:  None  Delusion:  None  Memory:  Intact  Orientation:  Person, Place, Time and Situation  Reliability:  fair  Insight:  Fair  Judgement:  Fair  Impulse Control:  Fair  Physical/Medical Issues:  No  and no acute medical issue today    Patient's Support Network Includes:  daughter    Progress toward goal: Not at goal    Functional Status: Severe impairment    Overall: Anxious         PROGNOSIS: carolina Serrano appears to be mentally/physically stable compared to  baseline functioning.  However, she continues to present with a severe chronic mental illness. As a result,  she  would be at significantly increased risk for decompensation and possibly higher level of care without continued treatment.  It is reasonable to assume she would considerably benefit from ongoing treatment.          PROGRESS TOWARD CURRENT PLAN OF CARE/TREATMENT PLAN DATED  :  Making Progress    SHORT-TERM GOALS: Maggie  will bathe and dress in street clothes daily, will attend therapy as scheduled, will take all medications as prescribed, will express feelings to therapist each contact , will identify the severity of symptoms each contact, will be compliant with all treatment recommendations, will learn and practice at least 2 anxiety management techniques with goal of decreasing anxiety, will work with therapist to help expose and extinguish irrational beliefs and conclusions that contribute to anxiety/depression, will work with therapist to identify conflicts from the past and the present that form the basis and will engage in one self-care activity daily, per self-report    LONG-TERM GOALS: With the help of therapy, I would like to:   learn how to structure my spare-time more meaningfully  (hobbies, etc)  clarify or come to terms with expectations or feelings related to my partner, spouse, or significant other  understand more clearly who I am, what I' m capable of, and what I want out of life  gain self-confidence or become more self-assured and clarify my needs and desires and learn how to express them more effectively  learn how to cope with my negative thoughts, ruminations, or sense of guilt, find a way out of my negative mood, sadness, or sense of inner emptiness, learn how to master anxiety or panic attacks and learn how to handle stressful situations better    STRENGTHS: Literate and Articulate    WEAKNESSES: Poor social support, Poor coping skills and co-dependent    Plan   Crisis Plan:  Symptoms and/or behaviors to indicate a crisis: Thinking about suicide    What calming techniques or other strategies will patient use to de-esclate and stay safe: slow down, breathe, visualize calming self, think it though, listen to music, change focus, take a walk    Who is one person patient can contact to assist with de-escalation? Daughter    Crisis Management: Maggie will contact staff or crisis line if symptoms exacerbate or if harm to self or others becomes a concern. Crisis resources include: Crisis Line [phone], 911, Local Law Enforcement, [facility], [facility] 24/7 Emergency Room [phone].    PLAN:   Maggie will continue in MONTHLY ongoing outpatient treatment via Teleheath Video via [employer] Video Visit and Epic Telephonic Visitwith primary therapist and pharmacotherapy as scheduled.   Maggie will report any adverse reactions to treatment/medication interventions immediately.  Maggie will be compliant with treatment and appointments.   April 8, 2020 14:24    Recommended Referrals: Psychiatrist/APRN    Patient will adhere to medication regimen as prescribed and report any side effects. Patient will contact this office, call 911 or present to the nearest emergency room should suicidal or  homicidal ideations occur. Provide Cognitive Behavioral Therapy and Solution Focused Therapy to improve functioning, maintain stability, and avoid decompensation and the need for higher level of care.          Future Appointments       Provider Department Center    4/13/2020 1:00 PM Haim Hill MD [facility] BEHAVIORAL HEALTH     5/6/2020 3:20 PM TAY BR SCREENING [facility] [address]    5/11/2020 12:00 PM Yumiko Alejandre LCSW [facility] BEHAVIORAL HEALTH     5/26/2020 3:00 PM Yumiko Alejandre LCSW [facility] BEHAVIORAL HEALTH     6/9/2020 12:00 PM Yumiko Alejandre LCSW [facility] BEHAVIORAL HEALTH     6/30/2020 1:00 PM TAY BEAU DEXA 1 [facility] DEXA ANJELICA TAY    3/15/2021 1:30 PM Daniel Ziegler MD [facility] OBSTETRICS AND GYNECOLOGY                 Yumiko Miles LCSW, LCSW and [facility]

## 2020-04-08 NOTE — TELEPHONE ENCOUNTER
Patient has an appointment on 4/13, but will be out of medication before then. She needs them sent to [facility] mail delivery.

## 2020-04-09 RX ORDER — ESZOPICLONE 3 MG/1
3 TABLET, FILM COATED ORAL NIGHTLY
Qty: 30 TABLET | Refills: 0 | Status: SHIPPED | OUTPATIENT
Start: 2020-04-09 | End: 2020-04-13 | Stop reason: SDUPTHER

## 2020-04-09 RX ORDER — DULOXETIN HYDROCHLORIDE 60 MG/1
60 CAPSULE, DELAYED RELEASE ORAL DAILY
Qty: 90 CAPSULE | Refills: 0 | Status: SHIPPED | OUTPATIENT
Start: 2020-04-09 | End: 2020-04-13 | Stop reason: SDUPTHER

## 2020-04-13 ENCOUNTER — TELEMEDICINE (OUTPATIENT)
Dept: PSYCHIATRY | Facility: CLINIC | Age: 70
End: 2020-04-13

## 2020-04-13 DIAGNOSIS — F34.1 DYSTHYMIC DISORDER: Primary | ICD-10-CM

## 2020-04-13 DIAGNOSIS — F41.1 GAD (GENERALIZED ANXIETY DISORDER): ICD-10-CM

## 2020-04-13 PROCEDURE — 99214 OFFICE O/P EST MOD 30 MIN: CPT | Performed by: PSYCHIATRY & NEUROLOGY

## 2020-04-13 RX ORDER — DULOXETIN HYDROCHLORIDE 60 MG/1
60 CAPSULE, DELAYED RELEASE ORAL DAILY
Qty: 90 CAPSULE | Refills: 0 | Status: SHIPPED | OUTPATIENT
Start: 2020-04-13 | End: 2020-07-21 | Stop reason: SDUPTHER

## 2020-04-13 RX ORDER — ESZOPICLONE 3 MG/1
3 TABLET, FILM COATED ORAL NIGHTLY
Qty: 76 TABLET | Refills: 0 | Status: SHIPPED | OUTPATIENT
Start: 2020-04-13 | End: 2020-07-13 | Stop reason: SDUPTHER
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
## 2020-05-06 ENCOUNTER — APPOINTMENT (OUTPATIENT)
Dept: MAMMOGRAPHY | Facility: HOSPITAL | Age: 70
End: 2020-05-06

## 2020-05-11 ENCOUNTER — OFFICE VISIT (OUTPATIENT)
Dept: PSYCHIATRY | Facility: CLINIC | Age: 70
End: 2020-05-11

## 2020-05-11 DIAGNOSIS — F34.1 DYSTHYMIC DISORDER: ICD-10-CM

## 2020-05-11 DIAGNOSIS — F41.1 GAD (GENERALIZED ANXIETY DISORDER): Primary | ICD-10-CM

## 2020-05-11 PROCEDURE — 90837 PSYTX W PT 60 MINUTES: CPT | Performed by: SOCIAL WORKER

## 2020-05-11 NOTE — PROGRESS NOTES
"  PROGRESS NOTE  Data:5/11/2020   12:00 pm to 12:53 pm  Maggie Emery came in 5/11/2020 for her regularly scheduled therapy session, with Yumiko Miles, RONALD,[facility]. Pt. Reports that she has surgery for her knee planned for July 2020.  Interactive Complexity No  DEPRESSION SYMPTOMS: anhedonia, fatigue, feelings of worthlessness/guilt, difficulty concentrating, hopelessnessdifficulty concentrating, fatigue, palpitations, racing thoughts, shortness of breathdoes not drinkno useno history of illicit drug use.  The patient shares that her sister passed away recently and her brother in law.  She shares worries about her sons, worries about her  who continues to rink-\"which I know isn't my fault\".Still wanting to try and \"fix\" him.     Clinical Maneuvering/Intervention:  Assisted patient in processing above session content; acknowledged and normalized patient’s thoughts, feelings, and concerns. Allowed patient to freely discuss issues without interruption or judgment. Provided safe, confidential environment to facilitate the development of positive therapeutic relationship and encourage open, honest communication. Continued to use CBT to assist the patient in challenging her negative thoughts.  She has difficulty focusing on herself instead talking about how worried she is about adult children, and her limited amount of money she has..  Patient was encouraged to attend 12 step support to assist the patient in decreasing her emotional distress and learn ways to live with someone who is alcoholic. Encouraged pt the importance of keeping all appointments and taking medications as prescribed if prescribed  and calling with any questions or concerns.  Assisted patient in identifying risk factors which would indicate the need for higher level of care including thoughts to harm self or others and/or self-harming behavior and encouraged patient to contact this office, call 911, or present to the nearest " emergency room should any of these events occur. Discussed crisis intervention services and means to access.  Patient adamantly and convincingly denies current suicidal or homicidal ideation or perceptual disturbance.    Assessment     Diagnoses and all orders for this visit:    MIRIAM (generalized anxiety disorder)    Dysthymic disorder    Patient presents for session on time, clean and casually dressed with depressed/anxious mood and congruent affect. No evidence of intoxication, withdrawal, or perceptual disturbance. Patient appears to maintain relative stability as compared to their baseline.  However, patient continues to struggle with anxiety and depression overall emotional distress which continues to cause impairment in important areas of functioning.  A result, they can be reasonably expected to continue to benefit from treatment and would likely be at increased risk for decompensation otherwise. Association’s intact, abstraction intact. Thought process is linear and logical. Speech is clear and coherent. Patient is oriented to person, place, and time. Attention and concentration fair. Insight and judgment fair. Patient reports no current suicidal or homicidal ideation. Patient appears cooperative and agreeable to treatment and appears to begin to develop rapport. P     ROS: Patient is positive for thoughts of self-harm    Mental Status Exam:   Hygiene:   good  Cooperation:  Cooperative  Eye Contact:  Fair  Psychomotor Behavior:  Appropriate  Affect:  Appropriate  Mood: anxious  Speech:  Normal  Thought Process:  Linear  Thought Content:  Normal  Suicidal:  None  Homicidal:  None  Hallucinations:  None  Delusion:  None  Memory:  Intact  Orientation:  Person, Place, Time and Situation  Reliability:  fair  Insight:  Fair  Judgement:  Fair  Impulse Control:  Fair  Physical/Medical Issues:  Yes HTN, knee pain, high cholesterol       Patient's Support Network Includes:  children and extended family    Progress  toward goal: Not at goal    Functional Status: Moderate impairment     Prognosis: Good with Ongoing Treatment            Symptoms and/or behaviors to indicate a crisis: Suicidal thoughts     What calming techniques or other strategies will patient use to de-esclate and stay safe: slow down, breathe, visualize calming self, think it though, listen to music, change focus, take a walk     Who is one person patient can contact to assist with de-escalation? daughter If symptoms/behaviors persist, patient will present to the nearest hospital for an assessment. Advised patient of [facility] 24/7 assessment services.   No follow-ups on file.    Patient will have at least monthly outpatient psychotherapy sessions or earlier if symptoms worsen or fail to improve and pharmacotherapy as scheduled with the focus on improved functioning and coping skills, maintaining stability and avoiding decompensation and the need for a higher level of care.      Patient will adhere to medication regimen as prescribed and report any side effects. Patient will contact this office, call 911 or present to the nearest emergency room should suicidal or homicidal ideations occur. Provide Cognitive Behavioral Therapy and Solution Focused Therapy to improve functioning, maintain stability, and avoid decompensation and the need for higher level of care.     Yumiko Miles, RONALD,[facility]

## 2020-05-11 NOTE — TREATMENT PLAN
Multi-Disciplinary Problems (from Behavioral Health Treatment Plan)    Active Problems     Problem: Anxiety  Start Date: 05/11/20    Problem Details:  The patient self-scales this problem as a 4 with 10 being the worst.       Goal Priority Start Date Expected End Date End Date    Patient will develop and implement behavioral and cognitive strategies to reduce anxiety and irrational fears. -- 05/11/20 05/11/21 --    Goal Details:  Progress toward goal:  The patient self-scales their progress related to this goal as a 4 with 10 being the worst.       Goal Intervention Frequency Start Date End Date    Help patient explore past emotional issues in relation to present anxiety. Q2 Weeks 05/11/20 05/11/21    Intervention Details:  Duration of treatment until until remission of symptoms.       Goal Intervention Frequency Start Date End Date    Help patient develop an awareness of their cognitive and physical responses to anxiety. Q Month 05/11/20 05/11/21    Intervention Details:  Duration of treatment until until remission of symptoms.             Problem: Co-Dependency  Start Date: 05/11/20    Problem Details:  The patient self-scales this problem as a 8 with 10 being the worst.       Goal Priority Start Date Expected End Date End Date    Patient will demonstrate ability to set healthy boundaries and meet own needs within a relationship. -- 05/11/20 05/11/21 --    Goal Details:  Progress toward goal:  The patient self-scales their progress related to this goal as a 8 with 10 being the worst.       Goal Intervention Frequency Start Date End Date    Assist patient in identifying enabling behaviors and healthy boundaries within relationships. Q2 Weeks 05/11/20 05/11/21    Intervention Details:  Duration of treatment until until remission of symptoms.             Problem: Depression  Start Date: 05/11/20    Problem Details:  The patient self-scales this problem as a 4 with 10 being the worst.       Goal Priority Start Date  Expected End Date End Date    Patient will demonstrate the ability to initiate new constructive life skills outside of sessions on a consistent basis. -- 05/11/20 05/11/21 --    Goal Details:  Progress toward goal:  The patient self-scales their progress related to this goal as a 4 with 10 being the worst.       Goal Intervention Frequency Start Date End Date    Assist patient in setting attainable activities of daily living goals. PRN 05/11/20 05/11/21    Goal Intervention Frequency Start Date End Date    Provide education about depression PRN 05/11/20 05/11/21    Intervention Details:  Duration of treatment until until remission of symptoms.       Goal Intervention Frequency Start Date End Date    Assist patient in developing healthy coping strategies. PRN 05/11/20 05/11/21    Intervention Details:  Duration of treatment until until remission of symptoms.                          I have discussed and reviewed this treatment plan with the patient.  It has been printed for signatures.

## 2020-05-26 ENCOUNTER — OFFICE VISIT (OUTPATIENT)
Dept: PSYCHIATRY | Facility: CLINIC | Age: 70
End: 2020-05-26

## 2020-05-26 DIAGNOSIS — F34.1 DYSTHYMIC DISORDER: ICD-10-CM

## 2020-05-26 DIAGNOSIS — F41.1 GAD (GENERALIZED ANXIETY DISORDER): Primary | ICD-10-CM

## 2020-05-26 PROCEDURE — 90837 PSYTX W PT 60 MINUTES: CPT | Performed by: SOCIAL WORKER

## 2020-05-26 NOTE — PROGRESS NOTES
Date of Service: May 26, 2020  Time In: 3:05  Time Out: 3:59 pm      PROGRESS NOTE  Data:Maggie Emery is a 70 y.o. female who met 1:1 with Yumiko Miles LCSW,[facility] for regularly scheduled Individual Therapy session.Maggie presents for session on time, clean and casually dressed with  without evidence of intoxication, withdrawal, or perceptual disturbance.   She was open and Engaged.      Chief Compliant: Patient presents with MIRIAM, dysthymia and co dependent behaviors    Interactive Complexity: Interactive Complexity No If yes, due to:       HPI: Patient shares that her knee continues to be problematic.  She shares that her sisters  isn't until Sept. she shares about her  and his ongoing drinking that she thinks is getting worse.  She reports not being able to attend any 12-step support, Al-Anon because the platform is down.  She thinks these meetings are helpful for her.  Patient shares about her children and her concern for her son who has to fly overseas.  She discusses the pandemic and how she is coping.  Depression Low mood for > 2 weeks, Decreased/Increased sleep, Feelings of guilt/worthlessness, Low energy, Impaired concentration and Psychomotor slowing  Generalized Anxiety  Excess Worry, Restless/Edgy, Easily fatigued and Muscle tension. Onset of symptoms was vague.  Symptoms are associated with relationship problem with unchanged since last visit and lack of support.  Symptoms are aggravated by lonely and stress.   Symptoms improve with medication management, therapy, personal self-care (wellness) and 12-step programs Current rates severity of symptoms, on a scale of 1-10 (10 is the most severe) 5 Context Family and social history was reviewed and is unchanged since last visit. Quality remained the same.    CLINICAL MANEUVERING/INTERVENTION/SUPPORTIVE PSYCHOTHERAPY: Therapist continued to promote the therapeutic alliance, address the patient’s issues, and strengthen self  awareness, insights, and coping skills.  Therapist applied CBT/REBT, Cognitive Challenging and Positive Coping Skills and encouraged she to use positive coping skills such as Exercising, Drawing/Art, Listen to music, Energy redirection, Spending time in nature and Utilize resources/coping skills.  Therapist allowed Maggie  to freely discuss issues without interruption or judgment. Provided safe, confidential environment to facilitate the development of positive therapeutic relationship and encourage open, honest communication.  Assisted patient to find other platforms that she can connect to Al-Anon meetings for additional support which appear to be helpful.  Patient continues to have difficulty with focus and attention and is encouraged to practice mindfulness as a way to decrease stressors anxiety and left mood.  Patient identifies that gardening is helpful and she is encouraged to continue to do this along with other crafts that she finds enjoyable.  Assisted patient in identifying increased risk factors which would indicate the need for higher level of care including thoughts to harm self or others, self-harming behavior, and/or binge drinking and encouraged patient to contact this office, call 911, or present to the nearest emergency room should any of these events occur. Discussed crisis intervention services and means to access.    Assessment        Psychological ROS: positive for - anxiety    Mental Status Exam:    Hygiene:   good  Cooperation:  Cooperative  Eye Contact:  Good  Psychomotor Behavior:  Appropriate  Affect:  Appropriate  Hopelessness: 4  Speech:  Normal  Thought Progress:  Linear  Thought Content:  Normal  Suicidal:  None  Homicidal:  None  Hallucinations:  None  Delusion:  None  Memory:  Intact  Orientation:  Person, Place, Time and Situation  Reliability:  good  Insight:  Fair  Judgement:  Fair  Impulse Control:  Fair  Physical/Medical Issues:  Yes Lyperlipidemia, HTN, Migraines, GERD, Knee  pain    Patient's Support Network Includes:  children    Progress toward goal: Not at goal    Functional Status: Moderate impairment     Overall: Anxious     VISIT DIAGNOSIS:     ICD-10-CM ICD-9-CM   1. MIRIAM (generalized anxiety disorder) F41.1 300.02   2. Dysthymic disorder F34.1 300.4        PROGNOSIS: carolina Serrano appears to be mentally/physically stable compared to his baseline functioning.  However, she continues to present with a severe chronic mental illness. As a result,  she  would be at significantly increased risk for decompensation and possibly higher level of care without continued treatment.  It is reasonable to assume she would considerably benefit from ongoing treatment.          PROGRESS TOWARD CURRENT PLAN OF CARE/TREATMENT PLAN :  Making Progress    SHORT-TERM GOALS: Maggie  will bathe and dress in street clothes daily, will attend therapy as scheduled, will take all medications as prescribed, will express feelings to therapist each contact , will identify the severity of symptoms each contact, will be compliant with all treatment recommendations, will learn and practice at least 2 anxiety management techniques with goal of decreasing anxiety, will learn and practice at least 2 depression management techniques with goal of decreasing depression, will work with therapist to help expose and extinguish irrational beliefs and conclusions that contribute to anxiety/depression, will work with therapist to identify conflicts from the past and the present that form the basis and will identify be able to explain personalized causes of depression    LONG-TERM GOALS: With the help of therapy, I would like to:   learn how to structure my spare-time more meaningfully (hobbies, etc)  learn how to be more assertive with others and set appropriate boundaries  discuss health, personal well being and mental health plans or ideas regarding my future   clarify my needs and desires and learn how to express them more  effectively, allow myself to experience feelings and express them more effectively and learn how to deal with strong negative feelings (e.g., anger, rage)  learn how to cope with my negative thoughts, ruminations, or sense of guilt, find a way out of my negative mood, sadness, or sense of inner emptiness, gain more drive and energy, learn how be more organized in daily life and learn how to handle stressful situations better    STRENGTHS: Literate, Good family support and Articulate    WEAKNESSES: Poor coping skills    Plan   Crisis Plan:  Symptoms and/or behaviors to indicate a crisis: Thinking about suicide    What calming techniques or other strategies will patient use to de-esclate and stay safe: slow down, breathe, visualize calming self, think it though, listen to music, change focus, take a walk  Who is one person patient can contact to assist with de-escalation? Daughter    Crisis Management: Maggie will contact staff or crisis line if symptoms exacerbate or if harm to self or others becomes a concern. Crisis resources include: Crisis Line [phone], 911, Local Law Enforcement, [facility], [facility] 24/7 Emergency Room [phone].    PLAN:   Maggie will continue in MONTHLY ongoing outpatient treatment via Face-to-Facewith primary therapist and pharmacotherapy as scheduled.   Maggie will report any adverse reactions to treatment/medication interventions immediately.  Maggie will be compliant with treatment and appointments.   May 26, 2020 15:15    Recommended Referrals: Psychiatrist/APRN  Patient will adhere to medication regimen as prescribed and report any side effects. Patient will contact this office, call 911 or present to the nearest emergency room should suicidal or homicidal ideations occur. Provide Cognitive Behavioral Therapy and Solution Focused Therapy to improve functioning, maintain stability, and avoid decompensation and the need for higher level of care.          Future Appointments        Provider Department Center    6/9/2020 12:00 PM Yumiko Alejandre LCSW [facility] BEHAVIORAL HEALTH     6/30/2020 1:00 PM [facility]    7/16/2020 1:00 PM Haim Hill MD [facility] BEHAVIORAL HEALTH     3/15/2021 1:30 PM Daniel Ziegler MD [facility] OBSTETRICS AND GYNECOLOGY                 Yumiko Miles LCSW, [facility]

## 2020-06-05 ENCOUNTER — TRANSCRIBE ORDERS (OUTPATIENT)
Dept: OBSTETRICS AND GYNECOLOGY | Facility: CLINIC | Age: 70
End: 2020-06-05

## 2020-06-05 DIAGNOSIS — Z12.31 VISIT FOR SCREENING MAMMOGRAM: Primary | ICD-10-CM

## 2020-06-09 ENCOUNTER — OFFICE VISIT (OUTPATIENT)
Dept: PSYCHIATRY | Facility: CLINIC | Age: 70
End: 2020-06-09

## 2020-06-09 DIAGNOSIS — F41.1 GAD (GENERALIZED ANXIETY DISORDER): Primary | ICD-10-CM

## 2020-06-09 DIAGNOSIS — F34.1 DYSTHYMIC DISORDER: ICD-10-CM

## 2020-06-09 PROCEDURE — 90837 PSYTX W PT 60 MINUTES: CPT | Performed by: SOCIAL WORKER

## 2020-06-09 NOTE — PROGRESS NOTES
Date of Service: June 9, 2020  Time In: 12:00 pm  Time Out: 12:54 pm      PROGRESS NOTE  Data:Maggie Emery is a 70 y.o. female     Maggie presents for session on time, clean and casually dressed with  without evidence of intoxication, withdrawal, or perceptual disturbance.   She was open and Engaged.      Chief Compliant: Patient presents with anxiety and depression    Interactive Complexity: Interactive Complexity No      HPI: Patient shares that she has been taking care of her flowers and gas a little garden and is watering the garden-which is very enjoyable and relaxing,   continues to have episodic bunge drinking and patient is trying to accept that is just how it is and he will probably not get better.  She commits to attend an [employer] meeting sometime during the next month.  Depression Low mood for > 2 weeks, Low energy and Psychomotor slowing  Generalized Anxiety  Excess Worry, Restless/Edgy, Easily fatigued, Muscle tension and Decreased concentration. Onset of symptoms was vague.  Symptoms are associated with relationship problem with unchanged since last visit, financial burdens and lack of support.  Symptoms are aggravated by anxiety, lonely and stress.   Symptoms improve with medication management and therapy Current rates severity of symptoms, on a scale of 1-10 (10 is the most severe) 5 Context Family and social history was reviewed and is unchanged since last visit Quality improved.    CLINICAL MANEUVERING/INTERVENTION/SUPPORTIVE PSYCHOTHERAPY: Therapist continued to promote the therapeutic alliance, address the patient’s issues, and strengthen self awareness, insights, and coping skills.  Therapist applied CBT/REBT, Cognitive Challenging and Positive Coping Skills and encouraged she to use positive coping skills such as Exercising, Listen to music, Taking a bath/shower, Playing with a pet, Energy redirection, Spending time in nature, Managing hostile feelings, Self Care (Take care of your body  in a way that makes you feel good - paint your nails, do your hair, put on a face mask), Establish healthy boundaries , Use positive self-talk and Utilize resources/coping skills.  Therapist allowed Maggie  to freely discuss issues without interruption or judgment. Provided safe, confidential environment to facilitate the development of positive therapeutic relationship and encourage open, honest communication. Assisted patient in identifying increased risk factors which would indicate the need for higher level of care including thoughts to harm self or others, self-harming behavior, and/or binge drinking and encouraged patient to contact this office, call 911, or present to the nearest emergency room should any of these events occur. Discussed crisis intervention services and means to access.    Assessment        Psychological ROS: positive for - anxiety and depression    Mental Status Exam:    Hygiene:   good  Cooperation:  Cooperative  Eye Contact:  Fair  Psychomotor Behavior:  Appropriate  Affect:  Appropriate  Hopelessness: 2  Speech:  Normal  Thought Progress:  Linear  Thought Content:  Normal  Suicidal:  None  Homicidal:  None  Hallucinations:  None  Delusion:  None  Memory:  Intact  Orientation:  Person, Place, Time and Situation  Reliability:  good  Insight:  Fair  Judgement:  Good  Impulse Control:  Good  Physical/Medical Issues:  no acute medical issue    Patient's Support Network Includes:  children and extended family    Progress toward goal: Not at goal    Functional Status: Moderate impairment     Overall: Anxious     VISIT DIAGNOSIS:     ICD-10-CM ICD-9-CM   1. MIRIAM (generalized anxiety disorder) F41.1 300.02   2. Dysthymic disorder F34.1 300.4        PROGNOSIS: arely Serrano appears to be mentally/physically stable compared to his baseline functioning.  However, she continues to present with a severe chronic mental illness. As a result,  she  would be at significantly increased risk for  decompensation and possibly higher level of care without continued treatment.  It is reasonable to assume she would considerably benefit from ongoing treatment.          PROGRESS TOWARD CURRENT PLAN OF CARE/TREATMENT PLAN :  Making Progress    SHORT-TERM GOALS: Maggie  will bathe and dress in street clothes daily, will attend therapy as scheduled, will take all medications as prescribed, will express feelings to therapist each contact , will identify the severity of symptoms each contact, will be compliant with all treatment recommendations, will learn and practice at least 2 anxiety management techniques with goal of decreasing anxiety, will learn and practice at least 2 depression management techniques with goal of decreasing depression, will work with therapist to help expose and extinguish irrational beliefs and conclusions that contribute to anxiety/depression, will work with therapist to identify conflicts from the past and the present that form the basis, will engage in one self-care activity daily, per self-report and will engage in one enjoyable activity daily, per self-report     LONG-TERM GOALS: With the help of therapy, I would like to:   learn how to structure my spare-time more meaningfully (hobbies, etc)  learn how to be more assertive with others and set appropriate boundaries, learn how to handle other people's reactions to my behavior (criticism, rejection, praise, etc.). and learn how to connect with other people (and how to maintain relationships)  discuss personal, health, personal well being and mental health plans or ideas regarding my future   gain self-confidence or become more self-assured, clarify my needs and desires and learn how to express them more effectively, learn how to adjust overly high expectations I have in myself or others and allow myself to experience feelings and express them more effectively  learn how to cope with my negative thoughts, ruminations, or sense of guilt, find  a way out of my negative mood, sadness, or sense of inner emptiness and learn how to handle stressful situations better    STRENGTHS: Literate, Good family support and Articulate    WEAKNESSES: Poor coping skills    Plan   Crisis Plan:  Symptoms and/or behaviors to indicate a crisis: Thinking about suicide    What calming techniques or other strategies will patient use to de-esclate and stay safe: slow down, breathe, visualize calming self, think it though, listen to music, change focus, take a walk  Who is one person patient can contact to assist with de-escalation? daughter    Crisis Management: Maggie will contact staff or crisis line if symptoms exacerbate or if harm to self or others becomes a concern. Crisis resources include: Crisis Line [phone], 911, Local Law Enforcement, [facility] [place], [facility] 24/7 Emergency Room [phone].    PLAN:   Maggie will continue in MONTHLY ongoing outpatient treatment via Face-to-Facewith primary therapist and pharmacotherapy as scheduled.   Maggie will report any adverse reactions to treatment/medication interventions immediately.  Maggie will be compliant with treatment and appointments.   June 9, 2020 12:05    Recommended Referrals: Psychiatrist/APRN  Patient will adhere to medication regimen as prescribed and report any side effects. Patient will contact this office, call 911 or present to the nearest emergency room should suicidal or homicidal ideations occur. Provide Cognitive Behavioral Therapy and Solution Focused Therapy to improve functioning, maintain stability, and avoid decompensation and the need for higher level of care.          Future Appointments       Provider Department Center    6/29/2020 3:00 PM Yumiko Alejandre LCSW [facility] BEHAVIORAL HEALTH     6/30/2020 1:00 PM TAY BEAU DEXA 1 [facility] TONG CROSS    7/16/2020 1:00 PM Haim Hill MD [facility] BEHAVIORAL HEALTH      8/31/2020 3:00 PM TAY BR SCREENING [facility] Breast [place] [address] TAY    3/15/2021 1:30 PM Daniel Ziegler MD [facility] OBSTETRICS AND GYNECOLOGY                 Yumiko Miles, OLGAW, [facility]

## 2020-06-30 ENCOUNTER — APPOINTMENT (OUTPATIENT)
Dept: BONE DENSITY | Facility: HOSPITAL | Age: 70
End: 2020-06-30

## 2020-07-13 DIAGNOSIS — F34.1 DYSTHYMIC DISORDER: ICD-10-CM

## 2020-07-13 DIAGNOSIS — F41.1 GAD (GENERALIZED ANXIETY DISORDER): ICD-10-CM

## 2020-07-13 RX ORDER — ESZOPICLONE 3 MG/1
3 TABLET, FILM COATED ORAL NIGHTLY
Qty: 30 TABLET | Refills: 0 | Status: SHIPPED | OUTPATIENT
Start: 2020-07-13 | End: 2020-07-21 | Stop reason: SDUPTHER

## 2020-07-20 ENCOUNTER — TELEMEDICINE (OUTPATIENT)
Dept: PSYCHIATRY | Facility: CLINIC | Age: 70
End: 2020-07-20

## 2020-07-20 DIAGNOSIS — F34.1 DYSTHYMIC DISORDER: ICD-10-CM

## 2020-07-20 DIAGNOSIS — F41.1 GAD (GENERALIZED ANXIETY DISORDER): Primary | ICD-10-CM

## 2020-07-20 PROCEDURE — 90837 PSYTX W PT 60 MINUTES: CPT | Performed by: SOCIAL WORKER

## 2020-07-20 NOTE — PROGRESS NOTES
"Date of Service: July 20, 2020  Time In:1:00  pm  Time Out: 1:53 pm      PROGRESS NOTE  Data:Maggie Emery is a 70 y.o. female who met 1:1 with Yumiko Miles, CRISTELA CARDENAS for regularly scheduled Individual Therapy session.  The Patient is  at home, using Epic Video Visit (HIPAA compliant), patient had difficulty with Internet connectivity. Patient is being seen via telehealth and stated they are in a secure environment for this session. The patient's condition being diagnosed/treated is appropriate for telemedicine. The provider identified herself and credentials RONALD and CRISTELA .   The patient  consent to be seen remotely, and when consent is given they understand that the consent allows for patient identifiable information to be sent to a third party as needed.   They may refuse to be seen remotely at any time. The electronic data is encrypted and password protected, and the patient has been advised of the potential risks to privacy not withstanding such measured of the potential risks to privacy not withstanding such measures.    Maggie presents for session on time, clean and casually dressed with  without evidence of intoxication, withdrawal, or perceptual disturbance.   She was open and engaged.      Chief Compliant: Patient presents with MIRIAM and depression    Interactive Complexity: Interactive Complexity No      HPI: Reports that her depression is \"in the middle\" and anxiety has lessened.  Patient shares that she has been able to \"let go\" and not worry as much as she had in the past she has been trying to enjoy the time she has together with her  when he is not drinking and walk away and ignore him when he chooses to drink.  Patient shares that that has made things much easier for her she shares about recent knee surgery and being on limitations because of the doctor not wanting her to walk very far for the first month she feels that she is doing well and has already noticed a reduction in " the amount of pain that she had been feeling before she had her surgery.  CLINICAL MANEUVERING/INTERVENTION/SUPPORTIVE PSYCHOTHERAPY: Therapist continued to promote the therapeutic alliance, address the patient’s issues, and strengthen self awareness, insights, and coping skills.  Discussed Al-Anon codependency and patient's letting go praised patient for her progress.  Therapist applied CBT/REBT and Exploration of Coping Skills and encouraged she to use positive coping skills such as Playing with a pet, Energy redirection, Spending time in nature, Establish healthy boundaries , Walk away (leave a situation that is causing you stress) and Utilize resources/coping skills.  Therapist allowed Maggie  to freely discuss issues without interruption or judgment. Provided safe, confidential environment to facilitate the development of positive therapeutic relationship and encourage open, honest communication. Assisted patient in identifying increased risk factors which would indicate the need for higher level of care including thoughts to harm self or others, self-harming behavior, and/or binge drinking and encouraged patient to contact this office, call 911, or present to the nearest emergency room should any of these events occur. Discussed crisis intervention services and means to access.    Assessment          Psychiatric/Behavioral: Negative for agitation, behavioral problems, decreased concentration, hallucinations, self-injury, sleep disturbance, suicidal ideas, negative for hyperactivity, depressed mood and stress. The patient is nervous/anxious.      Mental Status Exam:    Hygiene:   on the phone  Cooperation:  Cooperative  Eye Contact:  on the phone  Psychomotor Behavior:  Appropriate  Affect:  Appropriate  Hopelessness: 2  Speech:  Normal  Thought Progress:  Linear  Thought Content:  Normal  Suicidal:  None  Homicidal:  None  Hallucinations:  None  Delusion:  None  Memory:  Intact  Orientation:  Person, Place,  Time and Situation  Reliability:  good  Insight:  Good  Judgement:  Good  Impulse Control:  Good  Physical/Medical Issues:  Yes recent knee surgery    Patient's Support Network Includes:  children and extended family    Progress toward goal: Not at goal    Functional Status: Moderate impairment     Overall: WNL     VISIT DIAGNOSIS:     ICD-10-CM ICD-9-CM   1. MIRIAM (generalized anxiety disorder) F41.1 300.02   2. Dysthymic disorder F34.1 300.4        PROGNOSIS: arely Serrano appears to be mentally/physically stable compared to his baseline functioning.  However, she continues to present with a severe chronic mental illness. As a result,  she  would be at significantly increased risk for decompensation and possibly higher level of care without continued treatment.  It is reasonable to assume she would considerably benefit from ongoing treatment.          PROGRESS TOWARD CURRENT PLAN OF CARE/TREATMENT PLAN :  Making Progress    SHORT-TERM GOALS: Maggie  will bathe and dress in street clothes daily, will attend therapy as scheduled, will take all medications as prescribed, will express feelings to therapist each contact , will identify the severity of symptoms each contact, will be compliant with all treatment recommendations, will learn and practice at least 2 anxiety management techniques with goal of decreasing anxiety, will learn and practice at least 2 depression management techniques with goal of decreasing depression, will work with therapist to help expose and extinguish irrational beliefs and conclusions that contribute to anxiety/depression, will work with therapist to identify conflicts from the past and the present that form the basis, will engage in one self-care activity daily, per self-report and will engage in one enjoyable activity daily, per self-report     LONG-TERM GOALS: With the help of therapy, I would like to:   learn how to structure my spare-time more meaningfully (hobbies, etc)  clarify or  come to terms with expectations or feelings related to my partner, spouse, or significant other and learn how to be more assertive with others and set appropriate boundaries  discuss health, personal well being and mental health plans or ideas regarding my future   clarify my needs and desires and learn how to express them more effectively and allow myself to experience feelings and express them more effectively  learn how to cope with my negative thoughts, ruminations, or sense of guilt, find a way out of my negative mood, sadness, or sense of inner emptiness and learn how to handle stressful situations better    STRENGTHS: Motivated for treatment, Literate and Articulate    WEAKNESSES: Poor coping skills    Plan   Crisis Plan:  Symptoms and/or behaviors to indicate a crisis: Thinking about suicide    What calming techniques or other strategies will patient use to de-esclate and stay safe: slow down, breathe, visualize calming self, think it though, listen to music, change focus, take a walk  Who is one person patient can contact to assist with de-escalation? daughter    Crisis Management: Maggie will contact staff or crisis line if symptoms exacerbate or if harm to self or others becomes a concern. Crisis resources include: Crisis Line [phone], 911, Local Law Enforcement, [facility], [facility] 24/7 Emergency Room [phone].    PLAN:   Maggie will continue in MONTHLY or AS NEEDED ongoing outpatient treatment via Face-to-Facewith primary therapist and pharmacotherapy as scheduled.   Maggie will report any adverse reactions to treatment/medication interventions immediately.  Maggie will be compliant with treatment and appointments.   July 20, 2020 13:01    Recommended Referrals: Psychiatrist/APRN and Medical Provider (PCP)  Patient will adhere to medication regimen as prescribed and report any side effects. Patient will contact this office, call 911 or present to the nearest emergency room should suicidal or  homicidal ideations occur. Provide Cognitive Behavioral Therapy and Solution Focused Therapy to improve functioning, maintain stability, and avoid decompensation and the need for higher level of care.          Future Appointments       Provider Department Center    7/21/2020 1:30 PM Haim Hill MD [facility] BEHAVIORAL HEALTH     7/27/2020 4:00 PM [facility] 1 [facility]    9/1/2020 2:00 PM Yumiko Alejandre LCSW [facility] BEHAVIORAL HEALTH     3/15/2021 1:30 PM Daniel Ziegler MD [facility] OBSTETRICS AND GYNECOLOGY           Yumiko Miles LCSW, [facility]

## 2020-07-21 ENCOUNTER — TELEMEDICINE (OUTPATIENT)
Dept: PSYCHIATRY | Facility: CLINIC | Age: 70
End: 2020-07-21

## 2020-07-21 DIAGNOSIS — F34.1 DYSTHYMIC DISORDER: Primary | ICD-10-CM

## 2020-07-21 DIAGNOSIS — F41.1 GAD (GENERALIZED ANXIETY DISORDER): ICD-10-CM

## 2020-07-21 PROCEDURE — 99443 PR PHYS/QHP TELEPHONE EVALUATION 21-30 MIN: CPT | Performed by: PSYCHIATRY & NEUROLOGY

## 2020-07-21 RX ORDER — ESZOPICLONE 3 MG/1
3 TABLET, FILM COATED ORAL NIGHTLY
Qty: 90 TABLET | Refills: 0 | Status: SHIPPED | OUTPATIENT
Start: 2020-07-21 | End: 2020-08-10 | Stop reason: SDUPTHER

## 2020-07-21 RX ORDER — DULOXETIN HYDROCHLORIDE 60 MG/1
60 CAPSULE, DELAYED RELEASE ORAL DAILY
Qty: 90 CAPSULE | Refills: 0 | Status: SHIPPED | OUTPATIENT
Start: 2020-07-21 | End: 2020-11-03 | Stop reason: SDUPTHER

## 2020-07-21 NOTE — PROGRESS NOTES
This patient visit is electronic.  The patient gave consent to be seen remotely, and when consent is given they understand that the consent allows for patient identifiable information to be sent to a third party as needed.   They may refuse to be seen remotely at any time. The electronic data is encrypted and password protected, and the patient has been advised of the potential risks to privacy not withstanding such measures.    The patient is located at her home in [place].    Clinic visit by phone due to lack of alternative.      Patient ID: Maggie Emery is a 70 y.o. female    SERVICE TYPE: EVALUATION AND MANAGEMENT (greater than 50% of the time spent for supportive psychotherapy).      There were no vitals taken for this visit.    ALLERGIES:  Ambien [zolpidem tartrate]; Darvon [propoxyphene]; and Hydrocodone    CC/ Focus of the visit: Depression/anxiety    HPI: Patient status post partial left knee replacement July 1.  Recovery so far uncomplicated and proceeding as anticipated with home physical therapy.  Patient states states she is only taken 1 or 2 of the opiate PRN medications for pain in the past week.    Patient's psych status is been stable, less depressed but bored with the necessary lack of physical activity such as gardening and exercise.  States she has other projects like sewing and listening to audiobooks that have helped her cope with the COVID situation and her post op rehabilitation.  As noted by her therapist she is doing much better with her marital relationship utilizing strategies of enjoying good days but minimizing interactions with  her 's drinking.  Recent visitations with her 3 children have been positive and emotionally refreshing.    PFSH: Home situation is stabilized.    Review of Systems    SUPPORTIVE PSYCHOTHERAPY: continuing efforts to promote the therapeutic alliance, address the patient’s issues, and strengthen self awareness, insights, and positive coping  skills  listen to music, energy redirection, spending time in nature and utilizing resources.     Mental Status Exam  Appearance:    Attitude toward clinician:  cooperative and agreeable   Speech:    Rate:  regular rate and rhythm   Volume: normal  Motor:  no abnormal movements   Mood:  Good  Affect:  euthymic  Thought Processes:  linear, logical, and goal directed  Thought Content:  Normal   Feeling Hopeless: absent  Suicidal Thoughts or Intent:  absent  Homicidal Thoughts:  absent  Perceptual Disturbance: no perceptual disturbance  Attention and Concentration:  good  Insight and Judgement:  good  Memory:  memory appears to be intact    LABS: No results found for this or any previous visit (from the past 168 hour(s)).    MEDICATION ISSUES: Have discussed with the patient the medications Risks, Benefits, and Side effects including potential falls, possible impaired driving and  metabolic adversities among others. No medication side effects or related complaints today.     TREATMENT PLAN/GOALS: Continue supportive psychotherapy efforts and medications as indicated.     Current Outpatient Medications   Medication Sig Dispense Refill   • atenolol (TENORMIN) 50 MG tablet Take 50 mg by mouth Daily.     • diltiaZEM CD (CARTIA XT) 300 MG 24 hr capsule Take 300 mg by mouth Daily.     • DULoxetine (CYMBALTA) 60 MG capsule Take 1 capsule by mouth Daily. 90 capsule 0   • esterified estrogens (MENEST) 0.625 MG tablet Take 1 tablet by mouth Daily. 90 tablet 3   • eszopiclone (LUNESTA) 3 MG tablet Take 1 tablet by mouth Every Night. Take immediately before bedtime 90 tablet 0   • fluticasone (VERAMYST) 27.5 MCG/SPRAY nasal spray 2 sprays into each nostril Daily.     • lansoprazole (PREVACID) 30 MG capsule Take 30 mg by mouth Daily.     • losartan-hydrochlorothiazide (HYZAAR) 100-25 MG per tablet Take 1 tablet by mouth Daily.     • Potassium Chloride (KCL-20 PO) Take 20 tablets by mouth Daily.     • pravastatin (PRAVACHOL) 80 MG  tablet Daily.     • rizatriptan MLT (MAXALT-MLT) 5 MG disintegrating tablet Take 5 mg by mouth 1 (One) Time As Needed for migraine. May repeat in 2 hours if needed       No current facility-administered medications for this visit.      Patient has minimized utilizing the opiate pain medications prescribed, does not take the Lunesta simultaneously with opiates.    COLLATERAL PSYCHOTHERAPEUTIC INTERVENTION: Continuing with Yumiko Palencia LCSW, has found that very helpful     RISK: Moderate    Assessment/Plan     Diagnoses and all orders for this visit:    Dysthymic disorder  -     DULoxetine (CYMBALTA) 60 MG capsule; Take 1 capsule by mouth Daily.  -     eszopiclone (LUNESTA) 3 MG tablet; Take 1 tablet by mouth Every Night. Take immediately before bedtime    MIRIAM (generalized anxiety disorder)  -     DULoxetine (CYMBALTA) 60 MG capsule; Take 1 capsule by mouth Daily.  -     eszopiclone (LUNESTA) 3 MG tablet; Take 1 tablet by mouth Every Night. Take immediately before bedtime        Return in about 3 months (around 10/21/2020).           Patient knows to call if symptoms worsen or fail to improve between appointments.     I spent a total of 30 minutes in direct patient care, greater than 20 minutes (greater than 50%) were with the patient for assessment, coordination care, and counseling  regarding her status and answering any questions the patient had about the medication and plan..      Dictated utilizing Dragon dictation    MAKSIM Hill MD

## 2020-07-27 ENCOUNTER — TRANSCRIBE ORDERS (OUTPATIENT)
Dept: OBSTETRICS AND GYNECOLOGY | Facility: CLINIC | Age: 70
End: 2020-07-27

## 2020-07-27 ENCOUNTER — APPOINTMENT (OUTPATIENT)
Dept: MAMMOGRAPHY | Facility: HOSPITAL | Age: 70
End: 2020-07-27

## 2020-07-27 ENCOUNTER — HOSPITAL ENCOUNTER (OUTPATIENT)
Dept: BONE DENSITY | Facility: HOSPITAL | Age: 70
Discharge: HOME | End: 2020-07-27
Admitting: OBSTETRICS & GYNECOLOGY

## 2020-07-27 DIAGNOSIS — Z12.31 VISIT FOR SCREENING MAMMOGRAM: Primary | ICD-10-CM

## 2020-07-27 DIAGNOSIS — E89.40 POSTSURGICAL MENOPAUSE: ICD-10-CM

## 2020-07-27 PROCEDURE — 77080 DXA BONE DENSITY AXIAL: CPT

## 2020-07-27 PROCEDURE — 77080 DXA BONE DENSITY AXIAL: CPT | Performed by: RADIOLOGY

## 2020-07-28 ENCOUNTER — TELEPHONE (OUTPATIENT)
Dept: OBSTETRICS AND GYNECOLOGY | Facility: CLINIC | Age: 70
End: 2020-07-28

## 2020-07-28 NOTE — TELEPHONE ENCOUNTER
Patient advised of results of BDA to repeat in 2-3 years due to mild osteopenia.  Patient is currently on Calcium 600 mg twice daily and 800 IU Vit D along with exercise

## 2020-07-28 NOTE — TELEPHONE ENCOUNTER
----- Message from Daniel Ziegler MD sent at 7/27/2020  6:57 PM EDT -----  There is mild osteopenia. Rec: Calcium 600mg at least ytwice daily. Vit D 800 IU Exercise and repeat in about 2-3 years

## 2020-08-10 DIAGNOSIS — F34.1 DYSTHYMIC DISORDER: ICD-10-CM

## 2020-08-10 DIAGNOSIS — F41.1 GAD (GENERALIZED ANXIETY DISORDER): ICD-10-CM

## 2020-08-10 RX ORDER — ESZOPICLONE 3 MG/1
3 TABLET, FILM COATED ORAL NIGHTLY
Qty: 90 TABLET | Refills: 0 | Status: SHIPPED | OUTPATIENT
Start: 2020-08-10 | End: 2020-10-12 | Stop reason: SDUPTHER

## 2020-08-10 NOTE — TELEPHONE ENCOUNTER
[facility] accidentally cancelled prescription sent on 7/21/20, they are needing new one sent over. Thank you.

## 2020-08-15 ENCOUNTER — HOSPITAL ENCOUNTER (OUTPATIENT)
Dept: MAMMOGRAPHY | Facility: HOSPITAL | Age: 70
Discharge: HOME | End: 2020-08-15
Admitting: OBSTETRICS & GYNECOLOGY

## 2020-08-15 DIAGNOSIS — Z12.31 VISIT FOR SCREENING MAMMOGRAM: ICD-10-CM

## 2020-08-15 PROCEDURE — 77067 SCR MAMMO BI INCL CAD: CPT | Performed by: RADIOLOGY

## 2020-08-15 PROCEDURE — 77063 BREAST TOMOSYNTHESIS BI: CPT

## 2020-08-15 PROCEDURE — 77063 BREAST TOMOSYNTHESIS BI: CPT | Performed by: RADIOLOGY

## 2020-08-15 PROCEDURE — 77067 SCR MAMMO BI INCL CAD: CPT

## 2020-08-31 ENCOUNTER — APPOINTMENT (OUTPATIENT)
Dept: MAMMOGRAPHY | Facility: HOSPITAL | Age: 70
End: 2020-08-31

## 2020-09-01 ENCOUNTER — TELEMEDICINE (OUTPATIENT)
Dept: PSYCHIATRY | Facility: CLINIC | Age: 70
End: 2020-09-01

## 2020-09-01 DIAGNOSIS — F34.1 DYSTHYMIC DISORDER: ICD-10-CM

## 2020-09-01 DIAGNOSIS — F41.1 GAD (GENERALIZED ANXIETY DISORDER): Primary | ICD-10-CM

## 2020-09-01 PROCEDURE — 99443 PR PHYS/QHP TELEPHONE EVALUATION 21-30 MIN: CPT | Performed by: SOCIAL WORKER

## 2020-09-01 NOTE — TREATMENT PLAN
Multi-Disciplinary Problems (from Behavioral Health Treatment Plan)    Active Problems     Problem: Anxiety  Start Date: 05/11/20    Problem Details:  The patient self-scales this problem as a 4 with 10 being the worst.       Goal Priority Start Date Expected End Date End Date    Patient will develop and implement behavioral and cognitive strategies to reduce anxiety and irrational fears. -- 05/11/20 05/11/21 --    Goal Details:  Progress toward goal:  The patient self-scales their progress related to this goal as a 4 with 10 being the worst.       Goal Intervention Frequency Start Date End Date    Help patient explore past emotional issues in relation to present anxiety. Q2 Weeks 05/11/20 05/11/21    Intervention Details:  Duration of treatment until until remission of symptoms.       Goal Intervention Frequency Start Date End Date    Help patient develop an awareness of their cognitive and physical responses to anxiety. Q Month 05/11/20 05/11/21    Intervention Details:  Duration of treatment until until remission of symptoms.             Problem: Co-Dependency  Start Date: 05/11/20    Problem Details:  The patient self-scales this problem as a 8 with 10 being the worst.       Goal Priority Start Date Expected End Date End Date    Patient will demonstrate ability to set healthy boundaries and meet own needs within a relationship. -- 05/11/20 05/11/21 --    Goal Details:  Progress toward goal:  The patient self-scales their progress related to this goal as a 4 with 10 being the worst.  Has noticed that the pandemic has created more anxiety than what is typical for her.  Has accepted on most occassions her  drinking and it is out of her control.       Goal Intervention Frequency Start Date End Date    Assist patient in identifying enabling behaviors and healthy boundaries within relationships. Q2 Weeks 05/11/20 05/11/21    Intervention Details:  Duration of treatment until until remission of symptoms.              Problem: Depression  Start Date: 05/11/20    Problem Details:  The patient self-scales this problem as a 4 with 10 being the worst.       Goal Priority Start Date Expected End Date End Date    Patient will demonstrate the ability to initiate new constructive life skills outside of sessions on a consistent basis. -- 05/11/20 05/11/21 --    Goal Details:  Progress toward goal:  The patient self-scales their progress related to this goal as a 4 with 10 being the worst.  This has gone up and down but is better as she finds things she can do, celebrate recovery or alanon which she enjoys online.       Goal Intervention Frequency Start Date End Date    Assist patient in setting attainable activities of daily living goals. PRN 05/11/20 05/11/21    Goal Intervention Frequency Start Date End Date    Provide education about depression PRN 05/11/20 05/11/21    Intervention Details:  Duration of treatment until until remission of symptoms.       Goal Intervention Frequency Start Date End Date    Assist patient in developing healthy coping strategies. PRN 05/11/20 05/11/21    Intervention Details:  Duration of treatment until until remission of symptoms.                          I have discussed and reviewed this treatment plan with the patient.  I

## 2020-09-01 NOTE — PROGRESS NOTES
Date of Service: October 6, 2020  Time In: 2:00 pm  Time Out: 2:45 pm      PROGRESS NOTE  Data:Maggie Emery is a 70 y.o. female who met 1:1 with Yumiko Miles, CRISTELA CARDENAS for regularly scheduled Individual Therapy session.  The Patient is  at home, using Epic Telephonic Visit (HIPAA compliant). Patient could not get an Internet connection. Patient is being seen via telehealth and stated they are in a secure environment for this session. The patient's condition being diagnosed/treated is appropriate for telemedicine. The provider identified herself and credentials RONALD and CRISTELA .   The patient  consent to be seen remotely, and when consent is given they understand that the consent allows for patient identifiable information to be sent to a third party as needed.   They may refuse to be seen remotely at any time. The electronic data is encrypted and password protected, and the patient has been advised of the potential risks to privacy not withstanding such measured of the potential risks to privacy not withstanding such measures.    Maggie presents for session on time, clean and casually dressed with  without evidence of intoxication, withdrawal, or perceptual disturbance.   She was open and engaged.      Chief Compliant: Patient presents with anxiety and depression    Interactive Complexity: Interactive Complexity No      HPI: Patient shares that she had surgery done and is beginning to feel better.  She shares that she has stayed home and is more anxious than normal.  She shares that this is normal anxiety she thinks.   drinks heavy for a day or so and then does not drink for several days.  She shares concern for riots and having a  granddaughter.  She worries about the news and watches a lot of news.  Has been walking 3/4 to 1 mile a day.  Plans to go see her daughter in [place] at the end of September & maybe see her son in October.  Has lost 13 pounds since March and plans to lose a little  more by year end.   Depression Low mood for > 2 weeks, Decreased/Increased sleep, Low energy and Change in appetite  Generalized Anxiety  Excess Worry, Restless/Edgy, Easily fatigued and Muscle tension. Onset of symptoms was vague.  Symptoms are associated with relationship problem with unchanged since last visit and lack of support.  Symptoms are aggravated by anxiety, lonely, sadness and stress.   Symptoms improve with medication management and therapy Current rates severity of symptoms, on a scale of 1-10 (10 is the most severe) 5 Context Family and social history was reviewed and is unchanged since last  Quality been intermittent without a consistent pattern.    CLINICAL MANEUVERING/INTERVENTION/SUPPORTIVE PSYCHOTHERAPY: Therapist continued to promote the therapeutic alliance, address the patient’s issues, and strengthen self awareness, insights, and coping skills.  Continued to assist the patient in identify cognitive distortions while the Therapist applied CBT/REBT, Cognitive Challenging, Mindfulness Training, Positive Coping Skills, Relaxation/Deep Breathing and Thought Stopping and encouraged she to use positive coping skills such as Exercising, Listen to music, Playing with a pet, Energy redirection, Spending time in nature, Distraction, Self Care (Take care of your body in a way that makes you feel good - paint your nails, do your hair, put on a face mask), Establish healthy boundaries  and Utilize resources/coping skills.  Therapist allowed Maggie  to freely discuss issues without interruption or judgment. Provided safe, confidential environment to facilitate the development of positive therapeutic relationship and encourage open, honest communication. Assisted patient in identifying increased risk factors which would indicate the need for higher level of care including thoughts to harm self or others, self-harming behavior, and/or binge drinking and encouraged patient to contact this office, call 911, or  present to the nearest emergency room should any of these events occur. Discussed crisis intervention services and means to access.    Assessment        Psychological ROS: Negative for agitation, behavioral problems, decreased concentration, dysphoric mood, hallucinations, self-injury, sleep disturbance, suicidal ideas, negative for hyperactivity. The patient is nervous/anxious.      Mental Status Exam:    Hygiene:   on the phone  Cooperation:  Cooperative  Eye Contact:  on the phone  Psychomotor Behavior:  Appropriate  Affect:  Appropriate  Hopelessness: 2  Speech:  Normal  Thought Progress:  Linear  Thought Content:  Normal  Suicidal:  None  Homicidal:  None  Hallucinations:  None  Delusion:  None  Memory:  Intact  Orientation:  Person, Place, Time and Situation  Reliability:  good  Insight:  Good and Fair  Judgement:  Good  Impulse Control:  Good  Physical/Medical Issues:  healing from knee surgery    Patient's Support Network Includes:  daughter, children and extended family    Progress toward goal: Not at goal    Functional Status: Moderate impairment     Overall: Anxious     VISIT DIAGNOSIS:     ICD-10-CM ICD-9-CM   1. MIRIAM (generalized anxiety disorder)  F41.1 300.02   2. Dysthymic disorder  F34.1 300.4        PROGNOSIS: arely Serrano appears to be mentally/physically stable compared to his baseline functioning.  However, she continues to present with a severe chronic mental illness. As a result,  she  would be at significantly increased risk for decompensation and possibly higher level of care without continued treatment.  It is reasonable to assume she would considerably benefit from ongoing treatment.          PROGRESS TOWARD CURRENT PLAN OF CARE/TREATMENT PLAN  :  Making Progress    SHORT-TERM GOALS: Maggie  will bathe and dress in street clothes daily, will attend therapy as scheduled, will take all medications as prescribed, will express feelings to therapist each contact , will identify the severity of  symptoms each contact, will be compliant with all treatment recommendations, will learn and practice at least 2 anxiety management techniques with goal of decreasing anxiety, will learn and practice at least 2 depression management techniques with goal of decreasing depression, will work with therapist to help expose and extinguish irrational beliefs and conclusions that contribute to anxiety/depression, will engage in one self-care activity daily, per self-report and will engage in one enjoyable activity daily, per self-report     LONG-TERM GOALS: With the help of therapy, I would like to:   learn how to structure my spare-time more meaningfully (hobbies, etc)  learn how to handle being alone  discuss personal well being and mental health plans or ideas regarding my future   clarify my needs and desires and learn how to express them more effectively, allow myself to experience feelings and express them more effectively and learn how to deal with strong negative feelings (e.g., anger, rage)  learn how to cope with my negative thoughts, ruminations, or sense of guilt find a way out of my negative mood, sadness, or sense of inner emptiness learn how to handle stressful situations better    STRENGTHS: Motivated for treatment, Literate and Articulate    WEAKNESSES: Poor social support and Poor coping skills    Plan   Crisis Plan:  Symptoms and/or behaviors to indicate a crisis: Thinking about suicide    What calming techniques or other strategies will patient use to de-esclate and stay safe: slow down, breathe, visualize calming self, think it though, listen to music, change focus, take a walk  Who is one person patient can contact to assist with de-escalation? Daughter    Crisis Management: Maggie will contact staff or crisis line if symptoms exacerbate or if harm to self or others becomes a concern. Crisis resources include: Crisis Line [phone], 911, Local Law Enforcement, [facility], [facility] 24/7 Emergency Room  [phone].    PLAN:   Maggie will continue in MONTHLY or AS NEEDED ongoing outpatient treatment via Face-to-Facewith primary therapist and pharmacotherapy as scheduled.   Maggie will report any adverse reactions to treatment/medication interventions immediately.  Maggie will be compliant with treatment and appointments.   October 6, 2020 12:59 EDT    Recommended Referrals: Psychiatrist/APRN  Patient will adhere to medication regimen as prescribed and report any side effects. Patient will contact this office, call 911 or present to the nearest emergency room should suicidal or homicidal ideations occur. Provide Cognitive Behavioral Therapy and Solution Focused Therapy to improve functioning, maintain stability, and avoid decompensation and the need for higher level of care.          Future Appointments       Provider Department Center    10/6/2020 1:00 PM Yumiko Alejandre LCSW [facility] BEHAVIORAL HEALTH     10/26/2020 1:30 PM Haim Hill MD [facility] BEHAVIORAL HEALTH     3/15/2021 1:30 PM Daniel Ziegler MD [facility] OBSTETRICS AND GYNECOLOGY                 Yumiko Miles LCSW, [facility]

## 2020-10-06 ENCOUNTER — OFFICE VISIT (OUTPATIENT)
Dept: PSYCHIATRY | Facility: CLINIC | Age: 70
End: 2020-10-06

## 2020-10-06 DIAGNOSIS — F34.1 DYSTHYMIC DISORDER: ICD-10-CM

## 2020-10-06 DIAGNOSIS — F41.1 GAD (GENERALIZED ANXIETY DISORDER): Primary | ICD-10-CM

## 2020-10-06 PROCEDURE — 90837 PSYTX W PT 60 MINUTES: CPT | Performed by: SOCIAL WORKER

## 2020-10-06 NOTE — PROGRESS NOTES
Date of Service: October 6, 2020  Time In: 1:00 pm   Time Out: 1:55 pm      PROGRESS NOTE  Data:Maggie Emery is a 70 y.o. female who met 1:1 with Ymuiko Miles LCSW,CRISTELA for regularly scheduled Individual Therapy session.  Maggie presents for session on time, clean and casually dressed with  without evidence of intoxication, withdrawal, or perceptual disturbance.   She was open and engaged.      Chief Compliant: Patient presents with     Interactive Complexity: Interactive Complexity No      HPI: Patient reports that she is walking and trying to gain strength in her legs to help stablelize her knee. Reports that she went & saw her daughter to help watch the kids while daughter went out of town.  Daughter is now at her house until the weekend with the 6 & 2 yr old granddaughter.  Daughter may be drinking too muc-Patient is unsure. Son-in-law called her expressing concern that her daughter is drinking everyday.  Patient shares that she has'nt seen the drinking right now.   can be very controlling but underneath it all she thinks that daughter is drinking too much.  She shares that  & daughter have conflict and this is stressful and the patient wants to know how she can help. Patient shares that she will start painting her house & fixing it up in order to sell if Mick continues to keep drinking.    is pretty much ignores her and says mean things to her but not as frequently when they have company.  Depression Low mood for > 2 weeks, Loss of Interest, Feelings of guilt/worthlessness, Low energy, Impaired concentration and Psychomotor slowing  Generalized Anxiety  Excess Worry, Restless/Edgy, Easily fatigued and Muscle tension. Onset of symptoms was vague.  Symptoms are associated with relationship problem with no relationship issues at this time and lack of support.  Symptoms are aggravated by anxiety, lonely and stress.   Symptoms improve with medication management and therapy Current  "rates severity of symptoms, on a scale of 1-10 (10 is the most severe) 5 Context Family and social history was reviewed and is unchanged since last visit Quality been intermittent without a consistent pattern.    CLINICAL MANEUVERING/INTERVENTION/SUPPORTIVE PSYCHOTHERAPY: Therapist continued to promote the therapeutic alliance, address the patient’s issues, and strengthen self awareness, insights, and coping skills.  Assisted the patient to login to online [email or web address] meetings and what she can do because she was having problems getting the level of support.  Discussed current conflict and increased stress with daughter, son in law & .  Discussed being able to \"let things go that she has no control over\" and the  questions to ask herself, \"did I cause it, can I fix it and can I change it and if the answers are no the LET IT GO\". Therapist applied CBT/REBT, Cognitive Challenging, Exploration of Coping Skills, Positive Coping Skills, Structured Problem Solving and Thought Stopping and encouraged she to use positive coping skills such as Exercising, Drawing/Art, Playing with a pet, Energy redirection, Releasing pent up emotions, Distraction, Reframe the way you are thinking about the problem, Self Care (Take care of your body in a way that makes you feel good - paint your nails, do your hair, put on a face mask), Establish healthy boundaries , Use positive self-talk, Keep a positive attitude and Utilize resources/coping skills.  Therapist allowed Maggie  to freely discuss issues without interruption or judgment. Provided safe, confidential environment to facilitate the development of positive therapeutic relationship and encourage open, honest communication. Assisted patient in identifying increased risk factors which would indicate the need for higher level of care including thoughts to harm self or others, self-harming behavior, and/or binge drinking and encouraged patient to contact this office, call 911, or " present to the nearest emergency room should any of these events occur. Discussed crisis intervention services and means to access.    Assessment        Psychological ROS: Negative for agitation, behavioral problems, decreased concentration, dysphoric mood, hallucinations, self-injury, sleep disturbance, suicidal ideas, negative for hyperactivity, depressed mood and stress. The patient is nervous/anxious.      Mental Status Exam:    Hygiene:   good  Cooperation:  Cooperative  Eye Contact:  Good  Psychomotor Behavior:  Appropriate  Affect:  Appropriate  Hopelessness: 2  Speech:  Normal  Thought Progress:  Linear  Thought Content:  Normal  Suicidal:  None  Homicidal:  None  Hallucinations:  None  Delusion:  None  Memory:  Intact  Orientation:  Person, Place, Time and Situation  Reliability:  good  Insight:  Good and Fair  Judgement:  Good and Fair  Impulse Control:  Good  Physical/Medical Issues:  No     Patient's Support Network Includes:  , children and extended family    Progress toward goal: Not at goal    Functional Status: Moderate impairment     Overall: Anxious     VISIT DIAGNOSIS:     ICD-10-CM ICD-9-CM   1. MIRIAM (generalized anxiety disorder)  F41.1 300.02   2. Dysthymic disorder  F34.1 300.4        PROGNOSIS: carolina Serrano appears to be mentally/physically stable compared to his baseline functioning.  However, she continues to present with a severe chronic mental illness. As a result,  she  would be at significantly increased risk for decompensation and possibly higher level of care without continued treatment.  It is reasonable to assume she would considerably benefit from ongoing treatment.          PROGRESS TOWARD CURRENT PLAN OF CARE/TREATMENT PLAN :  Making Progress    SHORT-TERM GOALS: Maggie  will bathe and dress in street clothes daily, will attend therapy as scheduled, will take all medications as prescribed, will express feelings to therapist each contact , will identify the severity of  symptoms each contact, will be compliant with all treatment recommendations, will learn and practice at least 2 anxiety management techniques with goal of decreasing anxiety, will learn and practice at least 2 depression management techniques with goal of decreasing depression, will work with therapist to help expose and extinguish irrational beliefs and conclusions that contribute to anxiety/depression, will work with therapist to identify conflicts from the past and the present that form the basis, will identify be able to explain personalized causes of depression, will engage in one self-care activity daily, per self-report and will engage in one enjoyable activity daily, per self-report     LONG-TERM GOALS: With the help of therapy, I would like to:   learn how to structure my spare-time more meaningfully (hobbies, etc)  clarify or come to terms with expectations or feelings related to my partner, spouse, or significant other, learn how to be more assertive with others and set appropriate boundaries, learn how to handle other people's reactions to my behavior (criticism, rejection, praise, etc.). and learn how to connect with other people (and how to maintain relationships)  discuss health, personal well being and mental health plans or ideas regarding my future   clarify my needs and desires and learn how to express them more effectively, learn how to adjust overly high expectations I have in myself or others, allow myself to experience feelings and express them more effectively and learn how to deal with strong negative feelings (e.g., anger, rage)  learn how to cope with my negative thoughts, ruminations, or sense of guilt find a way out of my negative mood, sadness, or sense of inner emptiness gain more drive and energy learn how be more organized in daily life learn how to handle stressful situations better    STRENGTHS: Motivated for treatment, Literate and Articulate    WEAKNESSES: Poor coping  skills    Plan   Crisis Plan:  Symptoms and/or behaviors to indicate a crisis: Thinking about suicide    What calming techniques or other strategies will patient use to de-esclate and stay safe: slow down, breathe, visualize calming self, think it though, listen to music, change focus, take a walk  Who is one person patient can contact to assist with de-escalation? Daughter or son    Crisis Management: Maggie will contact staff or crisis line if symptoms exacerbate or if harm to self or others becomes a concern. Crisis resources include: Crisis Line [phone], 911, Local Law Enforcement, [facility], [facility] 24/7 Emergency Room [phone].    PLAN:   Maggie will continue in MONTHLY or AS NEEDED ongoing outpatient treatment via Face-to-Facewith primary therapist and pharmacotherapy as scheduled.   Maggie will report any adverse reactions to treatment/medication interventions immediately.  Maggie will be compliant with treatment and appointments.   October 6, 2020 13:00 EDT    Recommended Referrals: Psychiatrist/APRN  Patient will adhere to medication regimen as prescribed and report any side effects. Patient will contact this office, call 911 or present to the nearest emergency room should suicidal or homicidal ideations occur. Provide Cognitive Behavioral Therapy and Solution Focused Therapy to improve functioning, maintain stability, and avoid decompensation and the need for higher level of care.          Future Appointments       Provider Department Center    10/26/2020 1:30 PM Haim Hill MD [facility] BEHAVIORAL HEALTH     3/15/2021 1:30 PM Daniel Ziegler MD [facility] OBSTETRICS AND GYNECOLOGY                 Yumiko Miles, [facility], [facility]

## 2020-10-12 ENCOUNTER — TELEMEDICINE (OUTPATIENT)
Dept: PSYCHIATRY | Facility: CLINIC | Age: 70
End: 2020-10-12

## 2020-10-12 DIAGNOSIS — F34.1 DYSTHYMIC DISORDER: ICD-10-CM

## 2020-10-12 DIAGNOSIS — F41.1 GAD (GENERALIZED ANXIETY DISORDER): Primary | ICD-10-CM

## 2020-10-12 PROCEDURE — 99443 PR PHYS/QHP TELEPHONE EVALUATION 21-30 MIN: CPT | Performed by: PSYCHIATRY & NEUROLOGY

## 2020-10-12 RX ORDER — ESZOPICLONE 3 MG/1
3 TABLET, FILM COATED ORAL NIGHTLY
Qty: 90 TABLET | Refills: 0 | Status: SHIPPED | OUTPATIENT
Start: 2020-10-12 | End: 2020-11-13

## 2020-10-12 NOTE — PROGRESS NOTES
This patient visit is electronic.  The patient gave consent to be seen remotely, and when consent is given they understand that the consent allows for patient identifiable information to be sent to a third party as needed.   They may refuse to be seen remotely at any time. The electronic data is encrypted and password protected, and the patient has been advised of the potential risks to privacy not withstanding such measures.    The patient is located at her home in [facility].    Clinic visit by phone due to lack of alternative.    Patient ID: Maggie Emery is a 70 y.o. female    SERVICE TYPE: EVALUATION AND MANAGEMENT (greater than 50% of the time spent for supportive psychotherapy).      There were no vitals taken for this visit.    ALLERGIES:  Ambien [zolpidem tartrate], Darvon [propoxyphene], and Hydrocodone    CC/ Focus of the visit: depression/ anxiety.      HPI:   Improving mood the last several days, poor sleep continues, averaging 4-5 hours per night with the medication. Interest and activities improving modesty.   Coping behavior is staying busy with task about her home.   Seems to be applying CBT techniques discussed in therapy. Reviewed those with the patient today. Has good insight but continues to be stressed and disappointed by family relationships.       PFSH:daughter and the grandchildren have gone back home to [place] Ongoing marital conflicts. Looking forward sdn is [facility].   Feel at ease with the daughter in [place] and her family, but infrequent contact.     Review of Systems   Respiratory: Negative.    Cardiovascular: Negative.    Musculoskeletal:        Working with PT with her knee. Walking daily.        SUPPORTIVE PSYCHOTHERAPY: continuing efforts to promote the therapeutic alliance, address the patient’s issues, and strengthen self awareness, insights, and positive coping skills such as Exercising, listen to music, spending time in nature and utilizing resources.      Mental Status Exam  Appearance:    Attitude toward clinician:  cooperative and agreeable   Speech:    Rate:  regular rate and rhythm   Volume: normal  Motor:  no abnormal movements   Mood:  Good, rates her depression as moderate but improving.   Affect:  Dysphoric  Thought Processes:  linear, logical, and goal directed  Thought Content:  Normal   Feeling Hopeless: absent  Suicidal Thoughts or Intent:  absent  Homicidal Thoughts:  absent  Perceptual Disturbance: no perceptual disturbance  Attention and Concentration:  good  Insight and Judgement:  good  Memory:  memory appears to be intact    LABS: No results found for this or any previous visit (from the past 168 hour(s)).    MEDICATION ISSUES: Have discussed with the patient the medications Risks, Benefits, and Side effects including potential falls, possible impaired driving and  metabolic adversities among others. No medication side effects or related complaints today.     TREATMENT PLAN/GOALS: Continue supportive psychotherapy efforts and medications as indicated.     Current Outpatient Medications   Medication Sig Dispense Refill   • atenolol (TENORMIN) 50 MG tablet Take 50 mg by mouth Daily.     • diltiaZEM CD (CARTIA XT) 300 MG 24 hr capsule Take 300 mg by mouth Daily.     • DULoxetine (CYMBALTA) 60 MG capsule Take 1 capsule by mouth Daily. 90 capsule 0   • esterified estrogens (MENEST) 0.625 MG tablet Take 1 tablet by mouth Daily. 90 tablet 3   • eszopiclone (LUNESTA) 3 MG tablet Take 1 tablet by mouth Every Night. Take immediately before bedtime 90 tablet 0   • fluticasone (VERAMYST) 27.5 MCG/SPRAY nasal spray 2 sprays into each nostril Daily.     • lansoprazole (PREVACID) 30 MG capsule Take 30 mg by mouth Daily.     • losartan-hydrochlorothiazide (HYZAAR) 100-25 MG per tablet Take 1 tablet by mouth Daily.     • Potassium Chloride (KCL-20 PO) Take 20 tablets by mouth Daily.     • pravastatin (PRAVACHOL) 80 MG tablet Daily.     • rizatriptan MLT  (MAXALT-MLT) 5 MG disintegrating tablet Take 5 mg by mouth 1 (One) Time As Needed for migraine. May repeat in 2 hours if needed       No current facility-administered medications for this visit.        COLLATERAL PSYCHOTHERAPEUTIC INTERVENTION: endorsed her continuing therapeutic effort with Alvaro Palencia LCSW.     RISK:  Moderate    Assessment/Plan     Diagnoses and all orders for this visit:    MIRIAM (generalized anxiety disorder)  -     eszopiclone (LUNESTA) 3 MG tablet; Take 1 tablet by mouth Every Night. Take immediately before bedtime    Dysthymic disorder  -     eszopiclone (LUNESTA) 3 MG tablet; Take 1 tablet by mouth Every Night. Take immediately before bedtime           Patient has supply of the Cymbalta 60 mg daily.     Return in about 4 months (around 2/12/2021).           Patient knows to call if symptoms worsen or fail to improve between appointments.     I spent a total of 30 minutes in direct patient care, greater than 20 minutes (greater than 50%) were with the patient for assessment, coordination care, and counseling  regarding her status and answering any questions the patient had about the medication and plan..      Dictated utilizing [employer] dictation    MAKSIM iHll MD

## 2020-11-03 ENCOUNTER — OFFICE VISIT (OUTPATIENT)
Dept: PSYCHIATRY | Facility: CLINIC | Age: 70
End: 2020-11-03

## 2020-11-03 DIAGNOSIS — F34.1 DYSTHYMIC DISORDER: ICD-10-CM

## 2020-11-03 DIAGNOSIS — F41.1 GAD (GENERALIZED ANXIETY DISORDER): Primary | ICD-10-CM

## 2020-11-03 DIAGNOSIS — F41.1 GAD (GENERALIZED ANXIETY DISORDER): ICD-10-CM

## 2020-11-03 PROCEDURE — 90837 PSYTX W PT 60 MINUTES: CPT | Performed by: SOCIAL WORKER

## 2020-11-03 NOTE — PROGRESS NOTES
"Date of Service: November 3, 2020  Time In: 1:00  Time Out: 1:55      PROGRESS NOTE  Data:Maggie Emery is a 70 y.o. female who met 1:1 with Yumiko Miles LCSW,CRISTELA for regularly scheduled Individual Therapy session.  Maggie presents for session on time, clean and casually dressed with  without evidence of intoxication, withdrawal, or perceptual disturbance.   She was open and engaged.      Chief Compliant: Patient presents with anxiety & depression    Interactive Complexity: Interactive Complexity No      HPI: Patient did try to attend a celebrate recovery meeting but she was the only one that did attended.  Is still attending exercise 3x a week and is still stiff.  Walking the dogs on days she does not work out on machines.  Doesn't speak much to  especially when he is drinking.  Patient shares that she notices anger and frustrations.    Depression Low mood for > 2 weeks, Decreased/Increased sleep, Loss of Interest and Low energy  Generalized Anxiety  Excess Worry, Restless/Edgy, Easily fatigued and Muscle tension. Onset of symptoms was vague.  Symptoms are associated with relationship problem with unchanged since last visit, financial burdens and lack of support.  Symptoms are aggravated by anxiety, lonely and stress.   Symptoms improve with medication management and therapy Current rates severity of symptoms, on a scale of 1-10 (10 is the most severe) 4 Context Family and social history was reviewed and is unchanged since last visit  Quality been intermittent without a consistent pattern.    CLINICAL MANEUVERING/INTERVENTION/SUPPORTIVE PSYCHOTHERAPY: Therapist continued to promote the therapeutic alliance, address the patient’s issues, and strengthen self awareness, insights, and coping skills. The patient identifies her \"should statements create anger & frustration.  Discussed radical acceptance towards the things that she cannot control.  Patient is frustrated with the choice in the " presidential election and the news being untrue. Therapist applied CBT/REBT, Cognitive Challenging, Mindfulness Training and Thought Stopping and encouraged she to use positive coping skills such as Reframe the way you are thinking about the problem, Self Care (Take care of your body in a way that makes you feel good - paint your nails, do your hair, put on a face mask), Establish healthy boundaries , Use positive self-talk, Keep a positive attitude and Utilize resources/coping skills.  Therapist allowed Maggie  to freely discuss issues without interruption or judgment. Provided safe, confidential environment to facilitate the development of positive therapeutic relationship and encourage open, honest communication. Assisted patient in identifying increased risk factors which would indicate the need for higher level of care including thoughts to harm self or others, self-harming behavior, and/or binge drinking and encouraged patient to contact this office, call 911, or present to the nearest emergency room should any of these events occur. Discussed crisis intervention services and means to access.    Assessment        Psychological ROS: Negative for agitation, behavioral problems, decreased concentration, dysphoric mood, hallucinations, self-injury, sleep disturbance, suicidal ideas, negative for hyperactivity, depressed mood and stress. The patient is nervous/anxious.      Mental Status Exam:    Hygiene:   good  Cooperation:  Cooperative  Eye Contact:  Good  Psychomotor Behavior:  Appropriate  Affect:  Appropriate  Hopelessness: 2  Speech:  Normal  Thought Progress:  Linear  Thought Content:  Normal  Suicidal:  None  Homicidal:  None  Hallucinations:  None  Delusion:  None  Memory:  Intact  Orientation:  Person, Place, Time and Situation  Reliability:  fair  Insight:  Fair  Judgement:  Fair  Impulse Control:  Fair  Physical/Medical Issues:  No     Patient's Support Network Includes:  children and extended  family    Progress toward goal: Not at goal    Functional Status: Mild impairment     Overall: Anxious     VISIT DIAGNOSIS:     ICD-10-CM ICD-9-CM   1. MIRIAM (generalized anxiety disorder)  F41.1 300.02   2. Dysthymic disorder  F34.1 300.4        PROGNOSIS: carolina Serrano appears to be mentally/physically stable compared to his baseline functioning.  However, she continues to present with a severe chronic mental illness. As a result,  she  would be at significantly increased risk for decompensation and possibly higher level of care without continued treatment.  It is reasonable to assume she would considerably benefit from ongoing treatment.          PROGRESS TOWARD CURRENT PLAN OF CARE/TREATMENT PLAN DATED :  Making Progress    SHORT-TERM GOALS: Maggie  will express feelings to therapist each contact , will learn and practice at least 2 anxiety management techniques with goal of decreasing anxiety, will learn and practice at least 2 depression management techniques with goal of decreasing depression, will work with therapist to help expose and extinguish irrational beliefs and conclusions that contribute to anxiety/depression, will work with therapist to identify conflicts from the past and the present that form the basis, will engage in one self-care activity daily, per self-report and will engage in one enjoyable activity daily, per self-report     LONG-TERM GOALS: With the help of therapy, I would like to:   learn how to structure my spare-time more meaningfully (hobbies, etc)  clarify or come to terms with expectations or feelings related to my partner, spouse, or significant other, learn how to be more assertive with others and set appropriate boundaries and learn how to handle other people's reactions to my behavior (criticism, rejection, praise, etc.).  understand more clearly who I am, what I' m capable of, and what I want out of life  clarify my needs and desires and learn how to express them more  effectively and allow myself to experience feelings and express them more effectively  learn how to cope with my negative thoughts, ruminations, or sense of guilt find a way out of my negative mood, sadness, or sense of inner emptiness learn how to handle stressful situations better    STRENGTHS: Literate and Articulate    WEAKNESSES: Poor coping skills    Plan   Crisis Plan:  Symptoms and/or behaviors to indicate a crisis: Thinking about suicide    What calming techniques or other strategies will patient use to de-esclate and stay safe: slow down, breathe, visualize calming self, think it though, listen to music, change focus, take a walk  Who is one person patient can contact to assist with de-escalation? Daughter    Crisis Management: Maggie will contact staff or crisis line if symptoms exacerbate or if harm to self or others becomes a concern. Crisis resources include: Crisis Line [phone], 911, Local Law Enforcement, [facility], [facility] 24/7 Emergency Room [phone].    PLAN:   Maggie will continue in MONTHLY ongoing outpatient treatment via Face-to-Facewith primary therapist and pharmacotherapy as scheduled.   Maggie will report any adverse reactions to treatment/medication interventions immediately.  Maggie will be compliant with treatment and appointments.   November 3, 2020 13:06 EST    Recommended Referrals: Psychiatrist/APRN  Patient will adhere to medication regimen as prescribed and report any side effects. Patient will contact this office, call 911 or present to the nearest emergency room should suicidal or homicidal ideations occur. Provide Cognitive Behavioral Therapy and Solution Focused Therapy to improve functioning, maintain stability, and avoid decompensation and the need for higher level of care.          Future Appointments       Provider Department Center    12/1/2020 1:00 PM Yumiko Alejandre LCSW [facility] MEDICAL [facility] BEHAVIORAL HEALTH     2/15/2021 1:00 PM Sergio  Haim Vieira MD [facility] BEHAVIORAL HEALTH     3/15/2021 1:30 PM Daniel Ziegler MD [facility] OBSTETRICS AND GYNECOLOGY                 Yumiko Miles, OLGA, [facility][note 3]

## 2020-11-04 DIAGNOSIS — F34.1 DYSTHYMIC DISORDER: ICD-10-CM

## 2020-11-04 DIAGNOSIS — F41.1 GAD (GENERALIZED ANXIETY DISORDER): ICD-10-CM

## 2020-11-04 RX ORDER — DULOXETIN HYDROCHLORIDE 60 MG/1
60 CAPSULE, DELAYED RELEASE ORAL DAILY
Qty: 90 CAPSULE | Refills: 0 | Status: SHIPPED | OUTPATIENT
Start: 2020-11-04 | End: 2021-05-07 | Stop reason: SDUPTHER

## 2020-11-05 RX ORDER — ESZOPICLONE 3 MG/1
TABLET, FILM COATED ORAL
Qty: 90 TABLET | Refills: 0 | OUTPATIENT
Start: 2020-11-05

## 2020-11-10 DIAGNOSIS — F34.1 DYSTHYMIC DISORDER: ICD-10-CM

## 2020-11-10 DIAGNOSIS — F41.1 GAD (GENERALIZED ANXIETY DISORDER): ICD-10-CM

## 2020-11-10 RX ORDER — ESZOPICLONE 3 MG/1
3 TABLET, FILM COATED ORAL NIGHTLY
Qty: 90 TABLET | Refills: 0 | OUTPATIENT
Start: 2020-11-10

## 2020-11-10 NOTE — TELEPHONE ENCOUNTER
Yes, but it was sent on October 12 to the wrong pharmacy. They are needing it resent to [facility]. They are unable to transfer due to it being controlled.

## 2020-11-10 NOTE — TELEPHONE ENCOUNTER
This was accidentally sent to [facility], can you resend to [facility]? Patient did not fill it at [facility], it is much cheaper with [facility]. Thank you.

## 2020-11-13 DIAGNOSIS — F34.1 DYSTHYMIC DISORDER: Primary | ICD-10-CM

## 2020-11-13 RX ORDER — ESZOPICLONE 3 MG/1
3 TABLET, FILM COATED ORAL NIGHTLY PRN
Qty: 90 TABLET | Refills: 0 | Status: SHIPPED | OUTPATIENT
Start: 2020-11-13 | End: 2021-02-02 | Stop reason: SDUPTHER

## 2021-01-05 ENCOUNTER — OFFICE VISIT (OUTPATIENT)
Dept: PSYCHIATRY | Facility: CLINIC | Age: 71
End: 2021-01-05

## 2021-01-05 DIAGNOSIS — F34.1 DYSTHYMIC DISORDER: ICD-10-CM

## 2021-01-05 DIAGNOSIS — F41.1 GAD (GENERALIZED ANXIETY DISORDER): Primary | ICD-10-CM

## 2021-01-05 PROCEDURE — 90837 PSYTX W PT 60 MINUTES: CPT | Performed by: SOCIAL WORKER

## 2021-01-05 NOTE — TREATMENT PLAN
Multi-Disciplinary Problems (from Behavioral Health Treatment Plan)    Active Problems     Problem: Anxiety  Start Date: 05/11/20    Problem Details:  The patient self-scales this problem as a 4 with 10 being the worst.       Goal Priority Start Date Expected End Date End Date    Patient will develop and implement behavioral and cognitive strategies to reduce anxiety and irrational fears. -- 05/11/20 05/11/21 --    Goal Details:  Progress toward goal:  The patient self-scales their progress related to this goal as a 3 with 10 being the worst.       Goal Intervention Frequency Start Date End Date    Help patient explore past emotional issues in relation to present anxiety. Q2 Weeks 05/11/20     Intervention Details:  Duration of treatment until until remission of symptoms.       Goal Intervention Frequency Start Date End Date    Help patient develop an awareness of their cognitive and physical responses to anxiety. Q Month 05/11/20     Intervention Details:  Duration of treatment until until remission of symptoms.             Problem: Co-Dependency  Start Date: 05/11/20    Problem Details:  The patient self-scales this problem as a 8 with 10 being the worst.       Goal Priority Start Date Expected End Date End Date    Patient will demonstrate ability to set healthy boundaries and meet own needs within a relationship. -- 05/11/20 05/11/21 --    Goal Details:  Progress toward goal:  The patient self-scales their progress related to this goal as a 4-5 with 10 being the worst.       Goal Intervention Frequency Start Date End Date    Assist patient in identifying enabling behaviors and healthy boundaries within relationships. Q2 Weeks 05/11/20     Intervention Details:  Duration of treatment until until remission of symptoms.             Problem: Depression  Start Date: 05/11/20    Problem Details:  The patient self-scales this problem as a 4 with 10 being the worst.       Goal Priority Start Date Expected End  Date End Date    Patient will demonstrate the ability to initiate new constructive life skills outside of sessions on a consistent basis. -- 05/11/20 05/11/21 --    Goal Details:  Progress toward goal:  The patient self-scales their progress related to this goal as a 4 with 10 being the worst.  This has gone up and down but is better as she finds things she can do, celebrate recovery or alanon which she enjoys online.       Goal Intervention Frequency Start Date End Date    Assist patient in setting attainable activities of daily living goals. PRN 05/11/20     Goal Intervention Frequency Start Date End Date    Provide education about depression PRN 05/11/20     Intervention Details:  Duration of treatment until until remission of symptoms.       Goal Intervention Frequency Start Date End Date    Assist patient in developing healthy coping strategies. PRN 05/11/20     Intervention Details:  Duration of treatment until until remission of symptoms.                     Has noticed that the pandemic has continued to trigger more anxiety than what is typical for her.    Has accepted on most occassions her  drinking and it is out of her control.      I have discussed and reviewed this treatment plan with the patient.

## 2021-01-05 NOTE — PROGRESS NOTES
Date of Service: January 5, 2021  Time In: 1:00 pm  Time Out: 1:55 pm      PROGRESS NOTE  Data:Maggie Emery is a 71 y.o. female who met 1:1 with Yumiko Miles LCSW, LCADC for regularly scheduled Individual Therapy session.Maggie presents for session on time, clean and casually dressed with  without evidence of intoxication, withdrawal, or perceptual disturbance.   She was open and engaged.      Chief Compliant: Patient presents with anxiety & depression    Interactive Complexity: Interactive Complexity No      HPI: Patient went to [place] and saw daughter and grandchildren.  Patient is concerned about daughters anxiety.  The patient shares that her brother in law passed away.        Depression Low mood for > 2 weeks and Low energy  Generalized Anxiety  Excess Worry, Restless/Edgy and Muscle tension. Onset of symptoms was vague.  Symptoms are associated with relationship problem with unchanged since last visit, financial burdens and lack of support.  Symptoms are aggravated by anxiety, lonely and stress.   Symptoms improve with medication management, therapy, personal self-care (wellness) and 12-step programs Current rates severity of symptoms, on a scale of 1-10 (10 is the most severe) Denies Context Family and social history was reviewed and is unchanged since last visit Quality been intermittent without a consistent pattern.    CLINICAL MANEUVERING/INTERVENTION/SUPPORTIVE PSYCHOTHERAPY: Therapist continued to promote the therapeutic alliance, address the patient’s issues, and strengthen self awareness, insights, and coping skills.  Therapist applied CBT/REBT, Cognitive Challenging and REBT and encouraged she to use positive coping skills such as Exercising, Reframe the way you are thinking about the problem, Self Care (Take care of your body in a way that makes you feel good - paint your nails, do your hair, put on a face mask), Establish healthy boundaries , Walk away (leave a situation that is causing  you stress), Use positive self-talk, Keep a positive attitude and Utilize resources/coping skills.  Therapist allowed Maggie  to freely discuss issues without interruption or judgment. Provided safe, confidential environment to facilitate the development of positive therapeutic relationship and encourage open, honest communication. Assisted patient in identifying increased risk factors which would indicate the need for higher level of care including thoughts to harm self or others, self-harming behavior, and/or binge drinking and encouraged patient to contact this office, call 911, or present to the nearest emergency room should any of these events occur. Discussed crisis intervention services and means to access.    Assessment        Psychological ROS: Negative for agitation, behavioral problems, decreased concentration,hallucinations, self-injury, sleep disturbance, suicidal ideas, negative for hyperactivity. Positive for dysphoric mood, depressed mood and stress. The patient is nervous/anxious.     Mental Status Exam:    Hygiene:   good  Cooperation:  Cooperative  Eye Contact:  Good  Psychomotor Behavior:  Appropriate  Affect:  Appropriate  Hopelessness: Denies  Speech:  Normal  Thought Progress:  Linear  Thought Content:  Normal  Suicidal:  None  Homicidal:  None  Hallucinations:  None  Delusion:  None  Memory:  Intact  Orientation:  Person, Place, Time and Situation  Reliability:  fair  Insight:  Fair  Judgement:  Fair  Impulse Control:  Fair  Physical/Medical Issues:  no acute medical issues    Patient's Support Network Includes:  daughter and extended family    Progress toward goal: Not at goal    Functional Status: Moderate impairment     Overall: Anxious     VISIT DIAGNOSIS:     ICD-10-CM ICD-9-CM   1. MIRIAM (generalized anxiety disorder)  F41.1 300.02   2. Dysthymic disorder  F34.1 300.4        PROGNOSIS: good if patient follows treatment recommendations    Maggie appears to be mentally/physically stable  compared to his baseline functioning.  However, she continues to present with a severe chronic mental illness. As a result,  she  would be at significantly increased risk for decompensation and possibly higher level of care without continued treatment.  It is reasonable to assume she would considerably benefit from ongoing treatment.          PROGRESS TOWARD CURRENT PLAN OF CARE/TREATMENT PLAN  :  Making Progress    SHORT-TERM GOALS: Maggie  will learn and practice at least 2 anxiety management techniques with goal of decreasing anxiety, will learn and practice at least 2 depression management techniques with goal of decreasing depression, will work with therapist to help expose and extinguish irrational beliefs and conclusions that contribute to anxiety/depression, will work with therapist to identify conflicts from the past and the present that form the basis, will engage in one self-care activity daily, per self-report and will engage in one enjoyable activity daily, per self-report     LONG-TERM GOALS: With the help of therapy, I would like to:   learn how to structure my spare-time more meaningfully (hobbies, etc)  clarify or come to terms with expectations or feelings related to my partner, spouse, or significant other, learn how to be more assertive with others and set appropriate boundaries and learn how to handle other people's reactions to my behavior (criticism, rejection, praise, etc.).  understand more clearly who I am, what I' m capable of, and what I want out of life  clarify my needs and desires and learn how to express them more effectively, allow myself to experience feelings and express them more effectively and learn how to deal with strong negative feelings (e.g., anger, rage)  learn how to cope with my negative thoughts, ruminations, or sense of guilt find a way out of my negative mood, sadness, or sense of inner emptiness learn how to handle stressful situations better    STRENGTHS: Literate  and Articulate    WEAKNESSES: Poor social support and Poor coping skills    Plan   Crisis Plan:  Symptoms and/or behaviors to indicate a crisis: Thinking about suicide    What calming techniques or other strategies will patient use to de-esclate and stay safe: slow down, breathe, visualize calming self, think it though, listen to music, change focus, take a walk  Who is one person patient can contact to assist with de-escalation? Daughter    Crisis Management: Maggie will contact staff or crisis line if symptoms exacerbate or if harm to self or others becomes a concern. Crisis resources include: Crisis Line [phone], 911, Local Law Enforcement, [place], [facility] 24/7 Emergency Room [phone].    PLAN:   Maggie will continue in MONTHLY ongoing outpatient treatment via Face-to-Facewith primary therapist and pharmacotherapy as scheduled.   Maggie will report any adverse reactions to treatment/medication interventions immediately.  Maggie will be compliant with treatment and appointments.   January 5, 2021 13:07 EST    Recommended Referrals: Psychiatrist/APRN  Patient will adhere to medication regimen as prescribed and report any side effects. Patient will contact this office, call 911 or present to the nearest emergency room should suicidal or homicidal ideations occur. Provide Cognitive Behavioral Therapy and Solution Focused Therapy to improve functioning, maintain stability, and avoid decompensation and the need for higher level of care.          Future Appointments       Provider Department Center    2/23/2021 1:00 PM Haim Hill MD [facility] BEHAVIORAL HEALTH     3/15/2021 1:30 PM Daniel Ziegler MD [facility] OBSTETRICS AND GYNECOLOGY                 Yumiko Miles, [facility] [place],  [facility]

## 2021-01-10 DIAGNOSIS — F41.1 GAD (GENERALIZED ANXIETY DISORDER): ICD-10-CM

## 2021-01-10 DIAGNOSIS — F34.1 DYSTHYMIC DISORDER: ICD-10-CM

## 2021-01-12 RX ORDER — DULOXETIN HYDROCHLORIDE 60 MG/1
CAPSULE, DELAYED RELEASE ORAL
Qty: 90 CAPSULE | Refills: 3 | OUTPATIENT
Start: 2021-01-12

## 2021-02-02 DIAGNOSIS — F34.1 DYSTHYMIC DISORDER: ICD-10-CM

## 2021-02-03 RX ORDER — ESZOPICLONE 3 MG/1
3 TABLET, FILM COATED ORAL NIGHTLY PRN
Qty: 90 TABLET | Refills: 0 | Status: SHIPPED | OUTPATIENT
Start: 2021-02-03 | End: 2021-04-28 | Stop reason: SDUPTHER

## 2021-02-22 ENCOUNTER — OFFICE VISIT (OUTPATIENT)
Dept: PSYCHIATRY | Facility: CLINIC | Age: 71
End: 2021-02-22

## 2021-02-22 DIAGNOSIS — F34.1 DYSTHYMIC DISORDER: ICD-10-CM

## 2021-02-22 DIAGNOSIS — Z23 IMMUNIZATION DUE: ICD-10-CM

## 2021-02-22 DIAGNOSIS — F41.1 GAD (GENERALIZED ANXIETY DISORDER): Primary | ICD-10-CM

## 2021-02-22 PROCEDURE — 90837 PSYTX W PT 60 MINUTES: CPT | Performed by: SOCIAL WORKER
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
## 2021-02-23 ENCOUNTER — OFFICE VISIT (OUTPATIENT)
Dept: PSYCHIATRY | Facility: CLINIC | Age: 71
End: 2021-02-23

## 2021-02-23 VITALS
BODY MASS INDEX: 32.66 KG/M2 | HEIGHT: 61 IN | WEIGHT: 173 LBS | HEART RATE: 53 BPM | SYSTOLIC BLOOD PRESSURE: 131 MMHG | DIASTOLIC BLOOD PRESSURE: 80 MMHG | TEMPERATURE: 97.3 F

## 2021-02-23 DIAGNOSIS — F34.1 DYSTHYMIC DISORDER: Primary | ICD-10-CM

## 2021-02-23 DIAGNOSIS — F51.01 PRIMARY INSOMNIA: ICD-10-CM

## 2021-02-23 DIAGNOSIS — F41.1 GAD (GENERALIZED ANXIETY DISORDER): ICD-10-CM

## 2021-02-23 PROCEDURE — 99214 OFFICE O/P EST MOD 30 MIN: CPT | Performed by: PSYCHIATRY & NEUROLOGY

## 2021-02-23 RX ORDER — MECLIZINE HCL 12.5 MG/1
TABLET ORAL
COMMUNITY
Start: 2021-01-07 | End: 2022-03-21

## 2021-02-23 RX ORDER — LOSARTAN POTASSIUM AND HYDROCHLOROTHIAZIDE 25; 100 MG/1; MG/1
TABLET ORAL
COMMUNITY
Start: 2020-05-22 | End: 2021-02-23
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
## 2021-03-01 ENCOUNTER — IMMUNIZATION (OUTPATIENT)
Dept: VACCINE CLINIC | Facility: HOSPITAL | Age: 71
End: 2021-03-01

## 2021-03-01 DIAGNOSIS — Z23 IMMUNIZATION DUE: ICD-10-CM

## 2021-03-01 PROCEDURE — 91300 HC SARSCOV02 VAC 30MCG/0.3ML IM: CPT | Performed by: INTERNAL MEDICINE

## 2021-03-01 PROCEDURE — 0001A: CPT | Performed by: INTERNAL MEDICINE

## 2021-03-15 ENCOUNTER — OFFICE VISIT (OUTPATIENT)
Dept: OBSTETRICS AND GYNECOLOGY | Facility: CLINIC | Age: 71
End: 2021-03-15

## 2021-03-15 DIAGNOSIS — Z01.419 WOMEN'S ANNUAL ROUTINE GYNECOLOGICAL EXAMINATION: Primary | ICD-10-CM

## 2021-03-15 PROBLEM — M85.88 OSTEOPENIA OF LUMBAR SPINE: Status: ACTIVE | Noted: 2021-03-15

## 2021-03-16 ENCOUNTER — OFFICE VISIT (OUTPATIENT)
Dept: PSYCHIATRY | Facility: CLINIC | Age: 71
End: 2021-03-16

## 2021-03-16 DIAGNOSIS — F34.1 DYSTHYMIC DISORDER: Primary | ICD-10-CM

## 2021-03-16 DIAGNOSIS — F41.1 GAD (GENERALIZED ANXIETY DISORDER): ICD-10-CM

## 2021-03-16 PROCEDURE — 90837 PSYTX W PT 60 MINUTES: CPT | Performed by: SOCIAL WORKER

## 2021-03-16 NOTE — PROGRESS NOTES
Date of Service: March 16, 2021  Time In: 10:00 am  Time Out: 10:55       PROGRESS NOTE  Data:The patient is a 71 y.o. person who met 1:1 with Yumiko Miles, CRISTELA CARDENAS for regularly scheduled individual session.  The Patient is  at home, using Epic Video Visit (HIPAA compliant). Patient is being seen via telehealth and stated they are in a secure environment for this session. The patient's condition being diagnosed/treated is appropriate for telemedicine. The provider identified herself and credentials CRISTELA CARDENAS .   The patient  consent to be seen remotely, and when consent is given they understand that the consent allows for patient identifiable information to be sent to a third party as needed.   They may refuse to be seen remotely at any time. The electronic data is encrypted and password protected, and the patient has been advised of the potential risks to privacy not withstanding such measured of the potential risks to privacy not withstanding such measures.    Maggie presents for session on time, clean and casually dressed with  without evidence of intoxication, withdrawal, or perceptual disturbance.   The patient was open and engaged.      Chief Compliant: Patient presents with anxiety & depression    Interactive Complexity: Interactive Complexity No      HPI: Patient shares that she has received her first vaccine for the CVOID 19 virus.  She discusses marital conflict and the frustrations she feels.  She feels like she walks on egg shells with him and right now he hasn't spoken to her in 4 days because he was drinking.     Depression Loss of Interest, Feelings of guilt/worthlessness and Low energy  Generalized Anxiety  Excess Worry, Restless/Edgy, Easily fatigued and Muscle tension. Onset of symptoms was vague.  Symptoms are associated with relationship problem with unchanged since last visit and lack of support.  Symptoms are aggravated by anxiety, lonely and stress.   Symptoms improve with  "medication management, therapy and personal self-care (wellness) Current rates severity of symptoms, on a scale of 1-10 (10 is the most severe) 5 Context Family and social history was reviewed and is unchanged  Quality been intermittent without a consistent pattern.    CLINICAL MANEUVERING/INTERVENTION/SUPPORTIVE PSYCHOTHERAPY: Therapist continued to promote the therapeutic alliance, address the patient’s issues, and strengthen self awareness, insights, and coping skills. \"Reveiwed bookending\".  Discussed patient's tendency to overgeneralize and to negate any positives that she deems as \"not perfect\".  Also reviewed information on codependency and processed with her the intense anger resentment that she has at times.  Therapist applied CBT/REBT, Interactive Feedback and Positive Coping Skills and encouraged the patient to use positive coping skills such as Exercising, Reframe the way you are thinking about the problem, Self Care (Take care of your body in a way that makes you feel good - paint your nails, do your hair, put on a face mask), Establish healthy boundaries , Walk away (leave a situation that is causing you stress), Use positive self-talk, Keep a positive attitude and Utilize resources/coping skills.  Therapist allowed Maggie  to freely discuss issues without interruption or judgment. Provided safe, confidential environment to facilitate the development of positive therapeutic relationship and encourage open, honest communication. Assisted patient in identifying increased risk factors which would indicate the need for higher level of care including thoughts to harm self or others, self-harming behavior, and/or binge drinking and encouraged patient to contact this office, call 911, or present to the nearest emergency room should any of these events occur. Discussed crisis intervention services and means to access.    Assessment          Psychiatric/Behavioral: Negative for agitation, behavioral problems, " decreased concentration, dysphoric mood, hallucinations, self-injury, sleep disturbance, suicidal ideas, negative for hyperactivity. Positive for depressed mood and stress. The patient is nervous/anxious.      Mental Status Exam:    Hygiene:   good  Cooperation:  Cooperative  Eye Contact:  Good  Psychomotor Behavior:  Appropriate  Affect:  Appropriate  Hopelessness: 1  Speech:  Normal  Thought Progress:  Linear  Thought Content:  Normal  Suicidal:  None  Homicidal:  None  Hallucinations:  None  Delusion:  None  Memory:  Intact  Orientation:  Person, Place, Time and Situation  Reliability:  good  Insight:  Fair  Judgement:  Fair  Impulse Control:  Fair  Physical/Medical Issues:  no acute medical findings    Patient's Support Network Includes:  daughter and extended family    Progress toward goal: Not at goal    Functional Status: Moderate impairment     Overall: Anxious     VISIT DIAGNOSIS:     ICD-10-CM ICD-9-CM   1. Dysthymic disorder  F34.1 300.4   2. MIRIAM (generalized anxiety disorder)  F41.1 300.02        PROGNOSIS: good if patient follows treatment recommendations    The patient appears to be mentally/physically stable compared to his baseline functioning.  However, they continues to present with a severe chronic mental illness. As a result,  they would be at significantly increased risk for decompensation and possibly higher level of care without continued treatment.  It is reasonable to assume they would considerably benefit from ongoing treatment.          PROGRESS TOWARD CURRENT PLAN OF CARE/TREATMENT PLAN:  Making Progress    SHORT-TERM GOALS: The patient will  will learn and practice at least 2 anxiety management techniques with goal of decreasing anxiety, will learn and practice at least 2 depression management techniques with goal of decreasing depression, will work with therapist to identify conflicts from the past and the present that form the basis, will identify be able to explain personalized causes  of depression, will engage in one self-care activity daily, per self-report and will engage in one enjoyable activity daily, per self-report     LONG-TERM GOALS: With the help of therapy, I would like to:   learn how to enjoy life and have fun  clarify or come to terms with expectations or feelings related to my partner, spouse, or significant other, learn how to be more assertive with others and set appropriate boundaries and learn how to handle other people's reactions to my behavior (criticism, rejection, praise, etc.).  come to terms with things that happened in the past and understand more clearly who I am, what I' m capable of, and what I want out of life  clarify my needs and desires and learn how to express them more effectively, allow myself to experience feelings and express them more effectively and learn how to deal with strong negative feelings (e.g., anger, rage)  learn how to cope with my negative thoughts, ruminations, or sense of guilt find a way out of my negative mood, sadness, or sense of inner emptiness learn how to handle stressful situations better    STRENGTHS: Motivated for treatment, Literate and Articulate    WEAKNESSES: Poor social support and Poor coping skills    Plan   Crisis Plan:  Symptoms and/or behaviors to indicate a crisis: Thinking about suicide    What calming techniques or other strategies will patient use to de-esclate and stay safe: slow down, breathe, visualize calming self, think it though, listen to music, change focus, take a walk  Who is one person patient can contact to assist with de-escalation? daughter    Crisis Management: the Patient will contact staff or crisis line if symptoms exacerbate or if harm to self or others becomes a concern. Crisis resources include: Crisis Line [phone], 911, Local Law Enforcement, [facility], [facility] 24/7 Emergency Room [phone].    PLAN:   Will continue in MONTHLY ongoing outpatient treatment via Face-to-Facewith primary  therapist and pharmacotherapy as scheduled.   Will  will report any adverse reactions to treatment/medication interventions immediately.  Will be compliant with treatment and appointments.   March 16, 2021 10:06 EDT    Recommended Referrals: Psychiatrist/APRN  Patient will adhere to medication regimen as prescribed and report any side effects. Patient will contact this office, call 911 or present to the nearest emergency room should suicidal or homicidal ideations occur. Provide Cognitive Behavioral Therapy and Solution Focused Therapy to improve functioning, maintain stability, and avoid decompensation and the need for higher level of care.          Future Appointments       Provider Department Center    3/22/2021 9:10 AM [facility]    5/18/2021 1:00 PM Haim Hill MD [facility] BEHAVIORAL HEALTH     3/21/2022 1:30 PM Daniel Ziegler MD [facility] OB GYN TAY                Yumiko Miles, [facility],  [facility]

## 2021-03-22 ENCOUNTER — IMMUNIZATION (OUTPATIENT)
Dept: VACCINE CLINIC | Facility: HOSPITAL | Age: 71
End: 2021-03-22

## 2021-03-22 PROCEDURE — 91300 HC SARSCOV02 VAC 30MCG/0.3ML IM: CPT | Performed by: INTERNAL MEDICINE

## 2021-03-22 PROCEDURE — 0002A: CPT | Performed by: INTERNAL MEDICINE

## 2021-04-20 ENCOUNTER — TELEMEDICINE (OUTPATIENT)
Dept: PSYCHIATRY | Facility: CLINIC | Age: 71
End: 2021-04-20

## 2021-04-20 DIAGNOSIS — F34.1 DYSTHYMIC DISORDER: ICD-10-CM

## 2021-04-20 DIAGNOSIS — F41.1 GAD (GENERALIZED ANXIETY DISORDER): Primary | ICD-10-CM

## 2021-04-20 NOTE — TREATMENT PLAN
Multi-Disciplinary Problems (from Behavioral Health Treatment Plan)    Active Problems     Problem: Anxiety  Start Date: 05/11/20    Problem Details:  The patient self-scales this problem as a 4 with 10 being the worst.       Goal Priority Start Date Expected End Date End Date    Patient will develop and implement behavioral and cognitive strategies to reduce anxiety and irrational fears. -- 05/11/20 05/11/21 --    Goal Details:  Progress toward goal:  The patient self-scales their progress related to this goal as a 3 with 10 being the worst.       Goal Intervention Frequency Start Date End Date    Help patient explore past emotional issues in relation to present anxiety. Q2 Weeks 05/11/20     Intervention Details:  Duration of treatment until until remission of symptoms.       Goal Intervention Frequency Start Date End Date    Help patient develop an awareness of their cognitive and physical responses to anxiety. Q Month 05/11/20     Intervention Details:  Duration of treatment until until remission of symptoms.             Problem: Co-Dependency  Start Date: 05/11/20    Problem Details:  The patient self-scales this problem as a 8 with 10 being the worst.       Goal Priority Start Date Expected End Date End Date    Patient will demonstrate ability to set healthy boundaries and meet own needs within a relationship. -- 05/11/20 05/11/21 --    Goal Details:  Progress toward goal:  The patient self-scales their progress related to this goal as a 4-5 with 10 being the worst.       Goal Intervention Frequency Start Date End Date    Assist patient in identifying enabling behaviors and healthy boundaries within relationships. Q2 Weeks 05/11/20     Intervention Details:  Duration of treatment until until remission of symptoms.             Problem: Depression  Start Date: 05/11/20    Problem Details:  The patient self-scales this problem as a 4 with 10 being the worst.       Goal Priority Start Date Expected  End Date End Date    Patient will demonstrate the ability to initiate new constructive life skills outside of sessions on a consistent basis. -- 05/11/20 05/11/21 --    Goal Details:  Progress toward goal:  The patient self-scales their progress related to this goal as a 4 with 10 being the worst.  This has gone up and down but is better as she finds things she can do, celebrate recovery or alanon which she enjoys online.       Goal Intervention Frequency Start Date End Date    Assist patient in setting attainable activities of daily living goals. PRN 05/11/20     Goal Intervention Frequency Start Date End Date    Provide education about depression PRN 05/11/20     Intervention Details:  Duration of treatment until until remission of symptoms.       Goal Intervention Frequency Start Date End Date    Assist patient in developing healthy coping strategies. PRN 05/11/20     Intervention Details:  Duration of treatment until until remission of symptoms.                     Has noticed that the pandemic has continued to trigger more anxiety than what is typical for her.  Has accepted on most occassions her  drinking and it is out of her control.  Finds herself frustrated by husbands control of any financial resources. Is attending a quilting class and they are far beyond her skill level.  She is having fun and finding the others in the group enjoyable.  Is attending online support for codpendency    I have discussed and reviewed this treatment plan with the patient.

## 2021-04-20 NOTE — PROGRESS NOTES
Date of Service: April 20, 2021  Time In: 11:05 am  Time Out: 12:00 pm      PROGRESS NOTE  Data:The patient is a 71 y.o. person who met 1:1 with Yumiko Miles, CRISTELA CARDENAS for regularly scheduled individual session.  The Patient is  at home, using Epic Video Visit (HIPAA compliant). Patient is being seen via telehealth and stated they are in a secure environment for this session. The patient's condition being diagnosed/treated is appropriate for telemedicine. The provider identified herself and credentials CRISTELA CARDENAS .   The patient  consent to be seen remotely, and when consent is given they understand that the consent allows for patient identifiable information to be sent to a third party as needed.   They may refuse to be seen remotely at any time. The electronic data is encrypted and password protected, and the patient has been advised of the potential risks to privacy not withstanding such measured of the potential risks to privacy not withstanding such measures.    Maggie presents for session on time, clean and casually dressed with  without evidence of intoxication, withdrawal, or perceptual disturbance.   The patient was open and engaged.      Chief Compliant: Patient presents with anxiety, depression and co-dependency.  Interactive Complexity: Interactive Complexity No      HPI: Patient shares that she is planning to go to [place] and watch the younger 2 children 17 & 11 yr old while the parents are out of time.  She reports that she also plans to attend her sisters [facility] in July. Patient shares that her daughter plans to relocate to [facility].  Patient shares that she isn't able to get things done and planning is difficult for her. When asked she shares that she would like to be more organized so she can get things accomplished.  Has been back to the gym 3 days a week.  Depression Loss of Interest, Feelings of guilt/worthlessness and Low energy  Generalized Anxiety  Excess Worry,  Restless/Edgy, Easily fatigued and Muscle tension  Co-Dependency. Onset of symptoms was associated with husbands drinking.  Symptoms are associated with financial burdens and lack of support.  Symptoms are aggravated by anxiety and stress.   Symptoms improve with medication management, therapy, support groups and 12-step programs Current rates severity of symptoms, on a scale of 1-10 (10 is the most severe) 3 Context Family and social history was reviewed  Quality been intermittent without a consistent pattern.    CLINICAL MANEUVERING/INTERVENTION/SUPPORTIVE PSYCHOTHERAPY: Therapist continued to promote the therapeutic alliance, address the patient’s issues, and strengthen self awareness, insights, and coping skills.  Praised the patient for joining the the quilting class while assisting her to see the benefits of the socialization and doing things she enjoys. Has signed up for another class for 1 day and is looking forward to this.  Discussed attention and focus and explored ways to be more organized & accomplish tasks.  Reviewed book ending and using a list of goals and saying   Therapist applied  CBT/REBT and Interactive Feedback and encouraged the patient to use positive coping skills such as Exercising, Drawing/Art, Energy redirection, Releasing pent up emotions, Reframe the way you are thinking about the problem, Self Care (Take care of your body in a way that makes you feel good - paint your nails, do your hair, put on a face mask), Establish healthy boundaries , Use positive self-talk, Keep a positive attitude and Utilize resources/coping skills.  Therapist allowed Maggie  to freely discuss issues without interruption or judgment. Provided safe, confidential environment to facilitate the development of positive therapeutic relationship and encourage open, honest communication. Assisted patient in identifying increased risk factors which would indicate the need for higher level of care including thoughts to  harm self or others, self-harming behavior, and/or binge drinking and encouraged patient to contact this office, call 911, or present to the nearest emergency room should any of these events occur. Discussed crisis intervention services and means to access.    Assessment        Psychiatric/Behavioral: Negative for agitation, behavioral problems, decreased concentration, dysphoric mood, hallucinations, self-injury, sleep disturbance, suicidal ideas, negative for hyperactivity. Positive for long standing depressed mood and stress. The patient is nervous/anxious.      Mental Status Exam:    Hygiene:   good  Cooperation:  Cooperative  Eye Contact:  Good  Psychomotor Behavior:  Appropriate  Affect:  Appropriate  Hopelessness: Denies  Speech:  Normal  Thought Progress:  Goal directed and Linear  Thought Content:  Normal  Suicidal:  None  Homicidal:  None  Hallucinations:  None  Delusion:  None  Memory:  Intact  Orientation:  Person, Place, Time and Situation  Reliability:  good  Insight:  Good  Judgement:  Good  Impulse Control:  Good  Physical/Medical Issues:  no acute medical concern.    Patient's Support Network Includes:  children and extended family    Progress toward goal: Not at goal    Functional Status: Moderate impairment     Overall: Anxious     VISIT DIAGNOSIS:     ICD-10-CM ICD-9-CM   1. MIRIAM (generalized anxiety disorder)  F41.1 300.02   2. Dysthymic disorder  F34.1 300.4        PROGNOSIS: good if patient follows treatment recommendations    The patient appears to be mentally/physically stable compared to his baseline functioning.  However, they continues to present with a severe chronic mental illness. As a result,  they would be at significantly increased risk for decompensation and possibly higher level of care without continued treatment.  It is reasonable to assume they would considerably benefit from ongoing treatment.        PROGRESS TOWARD CURRENT PLAN OF CARE/TREATMENT PLAN:  Making  Progress    SHORT-TERM GOALS: The patient will  will learn and practice at least 2 anxiety management techniques with goal of decreasing anxiety, will learn and practice at least 2 depression management techniques with goal of decreasing depression, will work with therapist to help expose and extinguish irrational beliefs and conclusions that contribute to anxiety/depression, will work with therapist to identify conflicts from the past and the present that form the basis, will engage in one self-care activity daily, per self-report and will engage in one enjoyable activity daily, per self-report     LONG-TERM GOALS: With the help of therapy, I would like to:   learn how to structure my spare-time more meaningfully (hobbies, etc) and become calmer and more laid-back  clarify or come to terms with expectations or feelings related to my partner, spouse, or significant other, learn how to be more assertive with others and set appropriate boundaries and learn how to handle other people's reactions to my behavior (criticism, rejection, praise, etc.).  come to terms with things that happened in the past and understand more clearly who I am, what I' m capable of, and what I want out of life  clarify my needs and desires and learn how to express them more effectively, allow myself to experience feelings and express them more effectively and learn how to deal with strong negative feelings (e.g., anger, rage)  learn how to cope with my negative thoughts, ruminations, or sense of guilt find a way out of my negative mood, sadness, or sense of inner emptiness deal with problems related to housing learn how be more organized in daily life learn how to handle stressful situations better    STRENGTHS: Motivated for treatment, Literate and Articulate    WEAKNESSES: Poor social support and Poor coping skills    Plan   Crisis Plan:  Symptoms and/or behaviors to indicate a crisis: Thinking about suicide    What calming techniques or  other strategies will patient use to de-esclate and stay safe: slow down, breathe, visualize calming self, think it though, listen to music, change focus, take a walk  Who is one person patient can contact to assist with de-escalation? Daughter or Son    Crisis Management: the Patient will contact staff or crisis line if symptoms exacerbate or if harm to self or others becomes a concern. Crisis resources include: Crisis Line [phone], 911, Local Law Enforcement, [facility], [facility] 24/7 Emergency Room [phone].    PLAN:   Will continue in MONTHLY ongoing outpatient treatment via Face-to-Facewith primary therapist and pharmacotherapy as scheduled.   Will  will report any adverse reactions to treatment/medication interventions immediately.  Will be compliant with treatment and appointments.   April 20, 2021 11:19 EDT    Recommended Referrals: Psychiatrist/APRN- Request Dr. Hill to access memory if possible.  Patient will adhere to medication regimen as prescribed and report any side effects. Patient will contact this office, call 911 or present to the nearest emergency room should suicidal or homicidal ideations occur. Provide Cognitive Behavioral Therapy and Solution Focused Therapy to improve functioning, maintain stability, and avoid decompensation and the need for higher level of care.          Future Appointments       Provider Department Center    5/18/2021 1:00 PM Haim Hill MD [facility] BEHAVIORAL HEALTH COR    3/21/2022 1:30 PM Daniel Ziegler MD [facility] OB GYN TAY Miles, [facility],  [facility]

## 2021-04-28 DIAGNOSIS — F34.1 DYSTHYMIC DISORDER: ICD-10-CM

## 2021-04-29 RX ORDER — ESZOPICLONE 3 MG/1
3 TABLET, FILM COATED ORAL NIGHTLY PRN
Qty: 90 TABLET | Refills: 0 | Status: SHIPPED | OUTPATIENT
Start: 2021-04-29 | End: 2021-08-11 | Stop reason: SDUPTHER

## 2021-04-30 ENCOUNTER — TELEPHONE (OUTPATIENT)
Dept: PSYCHIATRY | Facility: CLINIC | Age: 71
End: 2021-04-30

## 2021-04-30 RX ORDER — ESZOPICLONE 3 MG/1
3 TABLET, FILM COATED ORAL NIGHTLY PRN
Qty: 7 TABLET | Refills: 0 | Status: SHIPPED | OUTPATIENT
Start: 2021-04-30 | End: 2021-05-18 | Stop reason: SDUPTHER

## 2021-04-30 NOTE — TELEPHONE ENCOUNTER
Patient called stating that Lunesta was sent to [facility] and she will be out before they are shipped to her, can you send her a week's worth of Lunesta into [facility]? Thank you.

## 2021-05-07 DIAGNOSIS — F41.1 GAD (GENERALIZED ANXIETY DISORDER): ICD-10-CM

## 2021-05-07 DIAGNOSIS — F34.1 DYSTHYMIC DISORDER: ICD-10-CM

## 2021-05-07 RX ORDER — DULOXETIN HYDROCHLORIDE 60 MG/1
60 CAPSULE, DELAYED RELEASE ORAL DAILY
Qty: 30 CAPSULE | Refills: 0 | Status: SHIPPED | OUTPATIENT
Start: 2021-05-07 | End: 2021-05-18 | Stop reason: SDUPTHER

## 2021-05-18 ENCOUNTER — OFFICE VISIT (OUTPATIENT)
Dept: PSYCHIATRY | Facility: CLINIC | Age: 71
End: 2021-05-18

## 2021-05-18 VITALS
BODY MASS INDEX: 32.85 KG/M2 | TEMPERATURE: 97.9 F | SYSTOLIC BLOOD PRESSURE: 124 MMHG | DIASTOLIC BLOOD PRESSURE: 66 MMHG | HEIGHT: 61 IN | OXYGEN SATURATION: 97 % | WEIGHT: 174 LBS | HEART RATE: 60 BPM

## 2021-05-18 DIAGNOSIS — F41.1 GAD (GENERALIZED ANXIETY DISORDER): ICD-10-CM

## 2021-05-18 DIAGNOSIS — F34.1 DYSTHYMIC DISORDER: Primary | ICD-10-CM

## 2021-05-18 PROCEDURE — 99214 OFFICE O/P EST MOD 30 MIN: CPT | Performed by: PSYCHIATRY & NEUROLOGY

## 2021-05-18 RX ORDER — DULOXETIN HYDROCHLORIDE 60 MG/1
60 CAPSULE, DELAYED RELEASE ORAL DAILY
Qty: 90 CAPSULE | Refills: 1 | Status: SHIPPED | OUTPATIENT
Start: 2021-05-18 | End: 2021-10-25 | Stop reason: SDUPTHER

## 2021-05-18 RX ORDER — ESZOPICLONE 3 MG/1
3 TABLET, FILM COATED ORAL NIGHTLY PRN
Qty: 90 TABLET | Refills: 0 | Status: SHIPPED | OUTPATIENT
Start: 2021-05-18 | End: 2021-10-25 | Stop reason: SDUPTHER

## 2021-05-18 NOTE — PROGRESS NOTES
"Patient ID: Maggie Emery is a 71 y.o. female    SERVICE TYPE: EVALUATION AND MANAGEMENT (greater than 50% of the time spent for supportive psychotherapy).      /66   Pulse 60   Temp 97.9 °F (36.6 °C)   Ht 156.2 cm (61.5\")   Wt 78.9 kg (174 lb)   SpO2 97%   BMI 32.35 kg/m²     ALLERGIES:  Ambien [zolpidem tartrate], Darvon [propoxyphene], Hydrocodone, and Ibuprofen    CC/ Focus of the visit: Depression/anxiety    HPI: Patient reports that she is doing fairly well, certainly without recurrence of significant major depressive symptomatology.  Continues to struggle with her home situation particularly over the family finances which her  controls.  Also there are perhaps an issue with her 's cognitive decline in part relating to his chronic alcohol dependency.  Patient sleeping fairly well with using Lunesta, no indication of any prescription issues or substance abuse.  Patient reports that she is following through with her and to exercise/walk as well as attend to the gym and has resumed her sewing plus plans to attend a [employer] work group in [place].  Also pleased with the fact of her daughter's relocation to [place].  Other positives are anticipated trip to [place] and her sister's [employer] service as well as a trip to [place] to stay with the grandchildren while her son and his wife take a trip.  These 2 trips will take a stubborn insistence to obtain financing from her .    PFSH: See HPI    Review of Systems  No cardiopulmonary, GI or neurological complaints.    SUPPORTIVE PSYCHOTHERAPY: continuing efforts to promote the therapeutic alliance, address the patient’s issues, and strengthen self awareness, insights, and positive coping skills such as Exercising, listen to music, spending time in nature and utilizing resources.  Discussed and endorsed the techniques by her therapist.    Mental Status Exam  Appearance: Appropriate  Attitude toward clinician:  cooperative and agreeable "   Speech:    Rate:  regular rate and rhythm   Volume: normal  Motor:  no abnormal movements   Mood:  Good  Affect:  euthymic  Thought Processes:  linear, logical, and goal directed  Thought Content:  Normal   Feeling Hopeless: absent  Suicidal Thoughts or Intent:  absent  Homicidal Thoughts:  absent  Perceptual Disturbance: no perceptual disturbance  Attention and Concentration:  good  Insight and Judgement:  good  Memory:  memory appears to be intact    LABS: No results found for this or any previous visit (from the past 168 hour(s)).    MEDICATION ISSUES: Have discussed with the patient the medications Risks, Benefits, and Side effects including potential falls, possible impaired driving and  metabolic adversities among others. No medication side effects or related complaints today.     TREATMENT PLAN/GOALS: Continue supportive psychotherapy efforts and medications as indicated.     Current Outpatient Medications   Medication Sig Dispense Refill   • atenolol (TENORMIN) 50 MG tablet Take 50 mg by mouth Daily.     • Diclofenac Sodium (Voltaren) 1 % gel gel Voltaren 1 % topical gel   APPLY 2 GRAMS TO THE AFFECTED AREA(S) BY TOPICAL ROUTE 4 TIMES PER DAY     • diltiaZEM CD (CARTIA XT) 300 MG 24 hr capsule Take 300 mg by mouth Daily.     • DULoxetine (CYMBALTA) 60 MG capsule Take 1 capsule by mouth Daily. 90 capsule 1   • eszopiclone (Lunesta) 3 MG tablet Take 1 tablet by mouth At Night As Needed for Sleep. Take immediately before bedtime 90 tablet 0   • eszopiclone (Lunesta) 3 MG tablet Take 1 tablet by mouth At Night As Needed for Sleep. Take immediately before bedtime  Indications: Trouble Sleeping 90 tablet 0   • fluticasone (VERAMYST) 27.5 MCG/SPRAY nasal spray 2 sprays into each nostril Daily.     • lansoprazole (PREVACID) 30 MG capsule Take 30 mg by mouth Daily.     • losartan-hydrochlorothiazide (HYZAAR) 100-25 MG per tablet Take 1 tablet by mouth Daily.     • meclizine (ANTIVERT) 12.5 MG tablet      •  Potassium Chloride (KCL-20 PO) Take 20 tablets by mouth Daily.     • pravastatin (PRAVACHOL) 80 MG tablet Daily.     • rizatriptan MLT (MAXALT-MLT) 5 MG disintegrating tablet Take 5 mg by mouth 1 (One) Time As Needed for migraine. May repeat in 2 hours if needed     • esterified estrogens (MENEST) 0.625 MG tablet Take 1 tablet by mouth Daily. 90 tablet 3     No current facility-administered medications for this visit.       COLLATERAL PSYCHOTHERAPEUTIC INTERVENTION: To be continuing with Yumiko Palencia LCSW, has proven to be very helpful and beneficial.    RISK: Moderate    Assessment/Plan     Diagnoses and all orders for this visit:    1. Dysthymic disorder (Primary)  -     DULoxetine (CYMBALTA) 60 MG capsule; Take 1 capsule by mouth Daily.  Dispense: 90 capsule; Refill: 1  -     eszopiclone (Lunesta) 3 MG tablet; Take 1 tablet by mouth At Night As Needed for Sleep. Take immediately before bedtime  Indications: Trouble Sleeping  Dispense: 90 tablet; Refill: 0.  This prescription is not refilled to after July 20.    2. MIRIAM (generalized anxiety disorder)  -     DULoxetine (CYMBALTA) 60 MG capsule; Take 1 capsule by mouth Daily.  Dispense: 90 capsule; Refill: 1  -     eszopiclone (Lunesta) 3 MG tablet; Take 1 tablet by mouth At Night As Needed for Sleep. Take immediately before bedtime  Indications: Trouble Sleeping  Dispense: 90 tablet; Refill: 0 fill after July 20.        Return in about 5 months (around 10/18/2021).           Patient knows to call if symptoms worsen or fail to improve between appointments.    I spent 30 minutes caring for Maggie on this date of service. This time includes time spent by me in the following activities: Patient evaluation, support psychotherapy, decisions, medications, and documentation.     Dictated utilizing Dragon dictation    MAKSIM Hill MD

## 2021-05-20 ENCOUNTER — TELEPHONE (OUTPATIENT)
Dept: PSYCHIATRY | Facility: CLINIC | Age: 71
End: 2021-05-20

## 2021-06-29 ENCOUNTER — TELEMEDICINE (OUTPATIENT)
Dept: PSYCHIATRY | Facility: CLINIC | Age: 71
End: 2021-06-29

## 2021-06-29 DIAGNOSIS — F34.1 DYSTHYMIC DISORDER: ICD-10-CM

## 2021-06-29 DIAGNOSIS — F41.1 GAD (GENERALIZED ANXIETY DISORDER): Primary | ICD-10-CM

## 2021-06-29 PROCEDURE — 90832 PSYTX W PT 30 MINUTES: CPT | Performed by: SOCIAL WORKER

## 2021-06-29 NOTE — PROGRESS NOTES
"Date of Service: June 29, 2021  Time In: 9:33 am  Time Out: 10:00 am      PROGRESS NOTE  Data:The patient is a 71 y.o. person who met 1:1 with Yumiko Miles, CRISTELA CARDENAS for regularly scheduled individual session.  The Patient is  at home, using Epic Video Visit (HIPAA compliant). Patient is being seen via telehealth and stated they are in a secure environment for this session. The patient's condition being diagnosed/treated is appropriate for telemedicine. The provider identified herself and credentials CRISTELA CARDENAS .   The patient  consent to be seen remotely, and when consent is given they understand that the consent allows for patient identifiable information to be sent to a third party as needed.   They may refuse to be seen remotely at any time. The electronic data is encrypted and password protected, and the patient has been advised of the potential risks to privacy not withstanding such measured of the potential risks to privacy not withstanding such measures.    Maggie presents for late for session, clean and casually dressed with  without evidence of intoxication, withdrawal, or perceptual disturbance.   The patient was open and engaged.      Chief Compliant: Patient presents with anxiety and dysthymia    Interactive Complexity: Interactive Complexity No      HPI: Able to visit [place] and it was \"OK\". Patient shares that her daughter is moving to [place] tomorrow and has called her numerous times already today.  Her son Jarod  is helping his sister move and will visit and looking forward to it.  Son Jarod will be leaving to go over seas in July.  She shares that  It is chaotic and at times stressful and she is working very hard not to fight/argue with her .  He isn't drinking heavily but he is resistant to any suggestions she has to make.  She has been able to walk away when he is difficult.    Depression Low mood for > 2 weeks, Decreased/Increased sleep and Low energy  Generalized Anxiety  " Excess Worry, Restless/Edgy, Easily fatigued and Muscle tension. Onset of symptoms was vague.  Symptoms are associated with financial burdens and lack of support.  Symptoms are aggravated by anxiety and stress.   Symptoms improve with medication management, therapy and personal self-care (wellness) Current rates severity of symptoms, on a scale of 1-10 (10 is the most severe) 5 Context Family and social history was reviewed  Quality been intermittent without a consistent pattern.    CLINICAL MANEUVERING/INTERVENTION/SUPPORTIVE PSYCHOTHERAPY: Therapist continued to promote the therapeutic alliance, address the patient’s issues, and strengthen self awareness, insights, and coping skills.  Contniued building on  Positive behaviors/building on strengths-praised the patient for her positive progress-organinazing closet, quilting, seeing con etc.  Reviewed radical acceptance and how to use as part of Mindfulness techniques. Therapist applied CBT/REBT and Exploration of Coping Skills and encouraged the patient to use positive coping skills such as Reframe the way you are thinking about the problem, Establish healthy boundaries , Make a list of choices (pros/cons) and Utilize resources/coping skills.  Therapist allowed Maggie  to freely discuss issues without interruption or judgment. Provided safe, confidential environment to facilitate the development of positive therapeutic relationship and encourage open, honest communication. Assisted patient in identifying increased risk factors which would indicate the need for higher level of care including thoughts to harm self or others, self-harming behavior, and/or binge drinking and encouraged patient to contact this office, call 911, or present to the nearest emergency room should any of these events occur. Discussed crisis intervention services and means to access.    Assessment          Psychiatric/Behavioral: Negative for agitation, behavioral problems, decreased  concentration, dysphoric mood, hallucinations, self-injury, sleep disturbance, suicidal ideas, negative for hyperactivity. Positive for low mood and stress. The patient is nervous/anxious.      Mental Status Exam:    Hygiene:   good  Cooperation:  Cooperative  Eye Contact:  Good  Psychomotor Behavior:  Appropriate  Affect:  Appropriate  Hopelessness: Denies  Speech:  Normal  Thought Progress:  Linear  Thought Content:  Normal  Suicidal:  None  Homicidal:  None  Hallucinations:  None  Delusion:  None  Memory:  Intact  Orientation:  Person, Place, Time and Situation  Reliability:  good  Insight:  Good and Fair  Judgement:  Good and Fair  Impulse Control:  Good and Fair  Physical/Medical Issues:  no acute medical concern    Patient's Support Network Includes:  children and extended family    Progress toward goal: Not at goal    Functional Status: Moderate impairment     Overall: Anxious     VISIT DIAGNOSIS:     ICD-10-CM ICD-9-CM   1. MIRIAM (generalized anxiety disorder)  F41.1 300.02   2. Dysthymic disorder  F34.1 300.4        PROGNOSIS: good if patient follows treatment recommendations    The patient appears to be mentally/physically stable compared to his baseline functioning.  However, they continues to present with a severe chronic mental illness. As a result,  they would be at significantly increased risk for decompensation and possibly higher level of care without continued treatment.  It is reasonable to assume they would considerably benefit from ongoing treatment.          PROGRESS TOWARD CURRENT PLAN OF CARE/TREATMENT PLAN:  Making Progress    SHORT-TERM GOALS: The patient will  will learn and practice at least 2 anxiety management techniques with goal of decreasing anxiety, will work with therapist to help expose and extinguish irrational beliefs and conclusions that contribute to anxiety/depression, will work with therapist to identify conflicts from the past and the present that form the basis, will engage  in one self-care activity daily, per self-report and will engage in one enjoyable activity daily, per self-report     LONG-TERM GOALS: With the help of therapy, I would like to:   learn how to structure my spare-time more meaningfully (hobbies, etc)  clarify or come to terms with expectations or feelings related to my partner, spouse, or significant other, learn how to be more assertive with others and set appropriate boundaries and learn how to handle other people's reactions to my behavior (criticism, rejection, praise, etc.).  understand more clearly who I am, what I' m capable of, and what I want out of life  clarify my needs and desires and learn how to express them more effectively, learn how to adjust overly high expectations I have in myself or others, allow myself to experience feelings and express them more effectively and learn how to deal with strong negative feelings (e.g., anger, rage)  learn how to cope with negative thoughts, ruminations, or sense of guilt, learn how be more organized in daily life and learn how to handle stressful situations better    STRENGTHS: Motivated for treatment, Literate and Articulate    WEAKNESSES: Poor social support and Poor coping skills    Plan   Crisis Plan:  Symptoms and/or behaviors to indicate a crisis: Thinking about suicide    What calming techniques or other strategies will patient use to de-esclate and stay safe: slow down, breathe, visualize calming self, think it though, listen to music, change focus, take a walk  Who is one person patient can contact to assist with de-escalation? Daughter     Crisis Management: the Patient will contact staff or crisis line if symptoms exacerbate or if harm to self or others becomes a concern. Crisis resources include: Crisis Line [phone], 911, Local Law Enforcement, [facility], [facility] 24/7 Emergency Room [phone].    PLAN:   Will continue in MONTHLY or AS NEEDED ongoing outpatient treatment via Face-to-Facewith  primary therapist and pharmacotherapy as scheduled.   Will  will report any adverse reactions to treatment/medication interventions immediately.  Will be compliant with treatment and appointments.   June 29, 2021 09:34 EDT    Recommended Referrals: Psychiatrist/APRN  Patient will adhere to medication regimen as prescribed and report any side effects. Patient will contact this office, call 911 or present to the nearest emergency room should suicidal or homicidal ideations occur. Provide Cognitive Behavioral Therapy and Solution Focused Therapy to improve functioning, maintain stability, and avoid decompensation and the need for higher level of care.          Future Appointments       Provider Department Center    7/20/2021 1:30 PM Haim Hill MD [facility]    8/2/2021 9:00 AM Yumiko Alejandre LCSW [facility]    3/21/2022 1:30 PM Daniel Ziegler MD [facility]                Yumiko Miles LCSW,  [facility]

## 2021-07-26 ENCOUNTER — TRANSCRIBE ORDERS (OUTPATIENT)
Dept: ADMINISTRATIVE | Facility: HOSPITAL | Age: 71
End: 2021-07-26

## 2021-07-26 DIAGNOSIS — F41.1 GAD (GENERALIZED ANXIETY DISORDER): ICD-10-CM

## 2021-07-26 DIAGNOSIS — Z12.31 VISIT FOR SCREENING MAMMOGRAM: Primary | ICD-10-CM

## 2021-07-26 DIAGNOSIS — F34.1 DYSTHYMIC DISORDER: ICD-10-CM

## 2021-07-26 RX ORDER — ESZOPICLONE 3 MG/1
3 TABLET, FILM COATED ORAL NIGHTLY PRN
Qty: 90 TABLET | Refills: 0 | OUTPATIENT
Start: 2021-07-26 | End: 2022-07-26

## 2021-07-26 NOTE — TELEPHONE ENCOUNTER
Should have supply added to do through the mid August based on the May 18 prescription for 3-month supply 6 7

## 2021-08-10 DIAGNOSIS — F34.1 DYSTHYMIC DISORDER: ICD-10-CM

## 2021-08-10 RX ORDER — ESZOPICLONE 3 MG/1
3 TABLET, FILM COATED ORAL NIGHTLY PRN
Qty: 90 TABLET | Refills: 0 | OUTPATIENT
Start: 2021-08-10 | End: 2022-08-10

## 2021-08-10 NOTE — TELEPHONE ENCOUNTER
Looks like she might have 6-month supply of this medication based on the prescriptions end of April and mid May for 90 each of the 3 mg Lunesta.  Check with the patient please

## 2021-08-11 RX ORDER — ESZOPICLONE 3 MG/1
3 TABLET, FILM COATED ORAL NIGHTLY PRN
Qty: 90 TABLET | Refills: 0 | Status: SHIPPED | OUTPATIENT
Start: 2021-08-11 | End: 2022-01-19 | Stop reason: SDUPTHER

## 2021-08-11 NOTE — TELEPHONE ENCOUNTER
Patient stated that she only got Lunesta filled for the 1 prescription in May. Pharmacy told her that she does not have another prescription.

## 2021-08-16 ENCOUNTER — OFFICE VISIT (OUTPATIENT)
Dept: PSYCHIATRY | Facility: CLINIC | Age: 71
End: 2021-08-16

## 2021-08-16 VITALS
DIASTOLIC BLOOD PRESSURE: 57 MMHG | WEIGHT: 171.8 LBS | HEART RATE: 53 BPM | BODY MASS INDEX: 32.44 KG/M2 | HEIGHT: 61 IN | SYSTOLIC BLOOD PRESSURE: 112 MMHG

## 2021-08-16 DIAGNOSIS — F34.1 DYSTHYMIC DISORDER: Primary | ICD-10-CM

## 2021-08-16 DIAGNOSIS — F41.1 GAD (GENERALIZED ANXIETY DISORDER): ICD-10-CM

## 2021-08-16 PROCEDURE — 99214 OFFICE O/P EST MOD 30 MIN: CPT | Performed by: PSYCHIATRY & NEUROLOGY

## 2021-08-16 RX ORDER — AMOXICILLIN AND CLAVULANATE POTASSIUM 875; 125 MG/1; MG/1
TABLET, FILM COATED ORAL
COMMUNITY
Start: 2021-08-08 | End: 2022-03-21

## 2021-08-16 RX ORDER — FLUTICASONE PROPIONATE 50 MCG
SPRAY, SUSPENSION (ML) NASAL
COMMUNITY

## 2021-08-16 NOTE — PROGRESS NOTES
"Patient ID: Maggie Emery is a 71 y.o. female    SERVICE TYPE: EVALUATION AND MANAGEMENT (greater than 50% of the time spent for supportive psychotherapy).      /57   Pulse 53   Ht 156.2 cm (61.5\")   Wt 77.9 kg (171 lb 12.8 oz)   BMI 31.94 kg/m²     ALLERGIES:  Ambien [zolpidem tartrate], Darvon [propoxyphene], Hydrocodone, and Ibuprofen    CC/ Focus of the visit: depression/ anxiety     HPI: Reviewed note from her last therapist appointment. Chronic dysphoria continues without a recurrent significant episode of clinical depression. She continues struggling with her marital issues, the 's antagonism and drinking although it would seems she could brake free all be it difficult in multiple ways.   Some degree of a victim mentality at play. Patient says she will continue with her therapy with Yumiko Palencia LCSW so very important component of her treatment.     No suggestion of Rx misuse or abuse.      PFSH: see above.     Review of Systems  No cardiopulmonary, GI or neurological complaints.    SUPPORTIVE PSYCHOTHERAPY: continuing efforts to promote the therapeutic alliance, address the patient’s issues, and strengthen self awareness, insights, and positive coping skills such as Exercising, listen to music, spending time in nature and utilizing resources.         Mental Status Exam  Appearance:  appropriate  Attitude toward clinician:  cooperative and agreeable   Speech:    Rate:  regular rate and rhythm   Volume: normal  Motor:  no abnormal movements   Mood:  Good  Affect:  euthymic  Thought Processes:  linear, logical, and goal directed  Thought Content:  Normal   Feeling Hopeless: absent  Suicidal Thoughts or Intent:  absent  Homicidal Thoughts:  absent  Perceptual Disturbance: no perceptual disturbance  Attention and Concentration:  good  Insight and Judgement:  good  Memory:  memory appears to be intact    LABS: No results found for this or any previous visit (from the past 168 " hour(s)).    MEDICATION ISSUES: Have discussed with the patient the medications Risks, Benefits, and Side effects including potential falls, possible impaired driving and  metabolic adversities among others. No medication side effects or related complaints today.     TREATMENT PLAN/GOALS: Continue supportive psychotherapy efforts and medications as indicated.     Current Outpatient Medications   Medication Sig Dispense Refill   • amoxicillin-clavulanate (AUGMENTIN) 875-125 MG per tablet      • atenolol (TENORMIN) 50 MG tablet Take 50 mg by mouth Daily.     • Diclofenac Sodium (Voltaren) 1 % gel gel Voltaren 1 % topical gel   APPLY 2 GRAMS TO THE AFFECTED AREA(S) BY TOPICAL ROUTE 4 TIMES PER DAY     • diltiaZEM CD (CARTIA XT) 300 MG 24 hr capsule Take 300 mg by mouth Daily.     • DULoxetine (CYMBALTA) 60 MG capsule Take 1 capsule by mouth Daily. 90 capsule 1   • eszopiclone (Lunesta) 3 MG tablet Take 1 tablet by mouth At Night As Needed for Sleep. Take immediately before bedtime  Indications: Trouble Sleeping 90 tablet 0   • eszopiclone (Lunesta) 3 MG tablet Take 1 tablet by mouth At Night As Needed for Sleep. Take immediately before bedtime 90 tablet 0   • fluticasone (Flonase) 50 MCG/ACT nasal spray Flonase Allergy Relief 50 mcg/actuation nasal spray,suspension   2 sprays each nostril     • fluticasone (VERAMYST) 27.5 MCG/SPRAY nasal spray 2 sprays into each nostril Daily.     • lansoprazole (PREVACID) 30 MG capsule Take 30 mg by mouth Daily.     • losartan-hydrochlorothiazide (HYZAAR) 100-25 MG per tablet Take 1 tablet by mouth Daily.     • meclizine (ANTIVERT) 12.5 MG tablet      • Potassium Chloride (KCL-20 PO) Take 20 tablets by mouth Daily.     • pravastatin (PRAVACHOL) 80 MG tablet Daily.     • rizatriptan MLT (MAXALT-MLT) 5 MG disintegrating tablet Take 5 mg by mouth 1 (One) Time As Needed for migraine. May repeat in 2 hours if needed       No current facility-administered medications for this visit.        COLLATERAL PSYCHOTHERAPEUTIC INTERVENTION:  patient not interested in additional psychotherapy.    RISK:  Moderate.     Assessment/Plan     Diagnoses and all orders for this visit:    1. Dysthymic disorder (Primary)  Has 3 months supplies of her medication: Cymbalta and Lunesta    2. MIRIAM (generalized anxiety disorder)  Same      Return in about 10 weeks (around 10/25/2021).           Patient knows to call if symptoms worsen or fail to improve between appointments.    I spent 30 minutes caring for Maggie on this date of service. This time includes time spent by me in the following activities: Patient evaluation, support psychotherapy, decisions, medications, and documentation.     Dictated utilizing VOZ dictation    MAKSIM Hill MD

## 2021-08-19 ENCOUNTER — OFFICE VISIT (OUTPATIENT)
Dept: PSYCHIATRY | Facility: CLINIC | Age: 71
End: 2021-08-19

## 2021-08-19 DIAGNOSIS — F41.1 GAD (GENERALIZED ANXIETY DISORDER): Primary | ICD-10-CM

## 2021-08-19 DIAGNOSIS — F34.1 DYSTHYMIC DISORDER: ICD-10-CM

## 2021-08-19 PROCEDURE — 90837 PSYTX W PT 60 MINUTES: CPT | Performed by: SOCIAL WORKER

## 2021-08-19 NOTE — PROGRESS NOTES
"Date of Service: August 19, 2021  Time In: 8:00 am  Time Out: 8:55 am      PROGRESS NOTE  Data:The patient is a 71 y.o. person who met 1:1 with Y[facility] for regularly scheduled individual session.    Maggie presents for session on time, clean and casually dressed with  without evidence of intoxication, withdrawal, or perceptual disturbance.   The patient was open and engaged.      Chief Compliant: Patient presents with anxiety & dysthymia    Interactive Complexity: Interactive Complexity No      HPI: Patient shares that her son-in-law got CVOID despite being vaccinated and the anxiety the pandemic has created. [facility] [place] [facility] and it was \"ok\". One sister and brother came to the [facility].  Sisters children only 1 came.  She able to put some self care into the trip.  Had some flight problems but able to make it home after a 24 hours delay.  Marital discord continues to ebb and flow based on the husbands drinking.   is resistant to make any changes around the house.  She continues to try and just focus on the good days.  Patient has been going to [employer] for sewing which she is enjoying.  Daughter lives in [place] and patient has been visiting more frequently.  Discussed money issues and her desire to make better choices.  Depression Low mood for > 2 weeks, Low energy and Impaired concentration  Generalized Anxiety  Excess Worry, Restless/Edgy, Easily fatigued and Decreased concentration. Onset of symptoms was vague.  Symptoms are associated with financial burdens and lack of support.  Symptoms are aggravated by anxiety and stress.   Symptoms improve with medication management, therapy and personal self-care (wellness) Current rates severity of symptoms, on a scale of 1-10 (10 is the most severe) 5 Context Family and social history was reviewed  Quality been intermittent without a consistent pattern.    CLINICAL MANEUVERING/INTERVENTION/SUPPORTIVE PSYCHOTHERAPY: " Therapist continued to promote the therapeutic alliance, address the patient’s issues, and strengthen self awareness, insights, and coping skills. Assisted the patient to engage her problem solving skills about financial concerns.  She shares concern over  reaction and was encouraged to keep thinking about this. Introduced stop challenge chose.  Therapist applied CBT/REBT, Exploration of Coping Skills and Interactive Feedback and encouraged the patient to use positive coping skills such as Exercising, Drawing/Art, Reframe the way you are thinking about the problem, Self Care (Take care of your body in a way that makes you feel good - paint your nails, do your hair, put on a face mask), Establish healthy boundaries , Walk away (leave a situation that is causing you stress), Use positive self-talk, Keep a positive attitude and Utilize resources/coping skills.  Therapist allowed Maggie  to freely discuss issues without interruption or judgment. Provided safe, confidential environment to facilitate the development of positive therapeutic relationship and encourage open, honest communication. Assisted patient in identifying increased risk factors which would indicate the need for higher level of care including thoughts to harm self or others, self-harming behavior, and/or binge drinking and encouraged patient to contact this office, call 911, or present to the nearest emergency room should any of these events occur. Discussed crisis intervention services and means to access.    Assessment          Psychiatric/Behavioral: Negative for agitation, behavioral problems, decreased concentration, dysphoric mood, hallucinations, self-injury, sleep disturbance, suicidal ideas, negative for hyperactivity.  The patient is nervous/anxious.      Mental Status Exam:    Hygiene:   good  Cooperation:  Cooperative  Eye Contact:  Good  Psychomotor Behavior:  Appropriate  Affect:  Appropriate  Hopelessness: Denies  Speech:   Normal  Thought Progress:  Linear  Thought Content:  Normal  Suicidal:  None  Homicidal:  None  Hallucinations:  None  Delusion:  None  Memory:  Intact  Orientation:  Person, Place, Time and Situation  Reliability:  good  Insight:  Good  Judgement:  Good and Fair  Impulse Control:  Good and Fair  Physical/Medical Issues:  sinus infection, HTN, high cholesterol    Patient's Support Network Includes:   and children    Progress toward goal: Not at goal    Functional Status: Moderate impairment     Overall: Anxious     VISIT DIAGNOSIS:     ICD-10-CM ICD-9-CM   1. MIRIAM (generalized anxiety disorder)  F41.1 300.02   2. Dysthymic disorder  F34.1 300.4        PROGNOSIS: fair if patient follows treatment recommendations    The patient appears to be mentally/physically stable compared to his baseline functioning.  However, they continues to present with a severe chronic mental illness. As a result,  they would be at significantly increased risk for decompensation and possibly higher level of care without continued treatment.  It is reasonable to assume they would considerably benefit from ongoing treatment.          PROGRESS TOWARD CURRENT PLAN OF CARE/TREATMENT PLAN:  Making Progress    SHORT-TERM GOALS: The patient will  will learn and practice at least 2 anxiety management techniques with goal of decreasing anxiety, will engage in one self-care activity daily, per self-report and will engage in one enjoyable activity daily, per self-report     LONG-TERM GOALS: With the help of therapy, I would like to:   learn how to enjoy life and have fun  clarify or come to terms with expectations or feelings related to my partner, spouse, or significant other, learn how to be more assertive with others and set appropriate boundaries and learn how to handle other people's reactions to my behavior (criticism, rejection, praise, etc.).  clarify questions regarding the meaning of my life or my activities.  clarify my needs and  desires and learn how to express them more effectively, allow myself to experience feelings and express them more effectively and learn how to deal with strong negative feelings (e.g., anger, rage)  learn how to cope with negative thoughts, ruminations, or sense of guilt, learn how be more organized in daily life and learn how to handle stressful situations better    STRENGTHS: Motivated for treatment, Literate and Articulate    WEAKNESSES: Poor social support and Poor coping skills    Plan   Crisis Plan:  Symptoms and/or behaviors to indicate a crisis: Thinking about suicide    What calming techniques or other strategies will patient use to de-esclate and stay safe: slow down, breathe, visualize calming self, think it though, listen to music, change focus, take a walk  Who is one person patient can contact to assist with de-escalation? Children, sewing group    Crisis Management: the Patient will contact staff or crisis line if symptoms exacerbate or if harm to self or others becomes a concern. Crisis resources include: Crisis Line [phone], [facility], Local Law Enforcement, Hospitals in [place], [facility] 24/7 Emergency Room [phone].    PLAN:   Will continue in MONTHLY or AS NEEDED ongoing outpatient treatment via Face-to-Facewith primary therapist and pharmacotherapy as scheduled.   Will  report any adverse reactions to treatment/medication interventions immediately.  Will be compliant with treatment and appointments.   August 19, 2021 08:09 EDT    Recommended Referrals: Psychiatrist/APRN  Patient will adhere to medication regimen as prescribed and report any side effects. Patient will contact this office, call 911 or present to the nearest emergency room should suicidal or homicidal ideations occur. Provide Cognitive Behavioral Therapy and Solution Focused Therapy to improve functioning, maintain stability, and avoid decompensation and the need for higher level of care.          Future Appointments       Provider  Department Center    8/25/2021 4:00 PM TAY BR SCREENING [facility] [place] [address] [facility]    9/20/2021 12:00 PM Yumiko Alejandre LCSW [facility]    10/25/2021 1:00 PM Haim Hill MD [facility]    3/21/2022 1:30 PM Daniel Ziegler MD [facility] OB GYN TAY                Yumiko Miles LCSW,  [facility] [place]

## 2021-08-25 ENCOUNTER — HOSPITAL ENCOUNTER (OUTPATIENT)
Dept: MAMMOGRAPHY | Facility: HOSPITAL | Age: 71
Discharge: HOME | End: 2021-08-25
Admitting: OBSTETRICS & GYNECOLOGY

## 2021-08-25 DIAGNOSIS — Z12.31 VISIT FOR SCREENING MAMMOGRAM: ICD-10-CM

## 2021-08-25 PROCEDURE — 77067 SCR MAMMO BI INCL CAD: CPT | Performed by: RADIOLOGY

## 2021-08-25 PROCEDURE — 77063 BREAST TOMOSYNTHESIS BI: CPT | Performed by: RADIOLOGY

## 2021-08-25 PROCEDURE — 77063 BREAST TOMOSYNTHESIS BI: CPT

## 2021-08-25 PROCEDURE — 77067 SCR MAMMO BI INCL CAD: CPT

## 2021-09-20 ENCOUNTER — OFFICE VISIT (OUTPATIENT)
Dept: PSYCHIATRY | Facility: CLINIC | Age: 71
End: 2021-09-20

## 2021-09-20 DIAGNOSIS — F34.1 DYSTHYMIC DISORDER: ICD-10-CM

## 2021-09-20 DIAGNOSIS — F41.1 GAD (GENERALIZED ANXIETY DISORDER): Primary | ICD-10-CM

## 2021-09-20 PROCEDURE — 90837 PSYTX W PT 60 MINUTES: CPT | Performed by: SOCIAL WORKER

## 2021-09-20 NOTE — PROGRESS NOTES
Date of Service: September 20, 2021  Time In: 11:56 am  Time Out: 12:50 pm      PROGRESS NOTE  Data:The patient is a 71 y.o. person who met 1:1 with Yumiko Miles LCSW,[facility] for regularly scheduled individual session.      Maggie presents for session on time, clean and casually dressed  without evidence of intoxication, withdrawal, or perceptual disturbance.   The patient was open and engaged.      Chief Compliant: Patient presents with anxiety & depression    Interactive Complexity: Interactive Complexity No If yes, due to:       HPI: Had several injections in her hand and her back because of pain. Her doctors have suggested surgery which she would like to put off as long as possible.  Plans to have carpal tunnel surgery sometime Oct/Nov.  She reports that she is still sewing and is enjoying it.  She continues to take a class but is feeling pressure because she is far behind and some inconsistent responses to should she attend weekly or not.  Brother called and wants to see her son watch the kids play football.  Had a nice time out of the blue with .  She shares about her worries about her cat that likes to go outside.  Depression Low mood for > 2 weeks, Feelings of guilt/worthlessness, Low energy and Impaired concentration  Generalized Anxiety  Excess Worry, Restless/Edgy, Easily fatigued and Muscle tension. Onset of symptoms was vague.  Symptoms are associated with relationship problem with  with no change and lack of support.  Symptoms are aggravated by anxiety and stress.   Symptoms improve with medication management, therapy and personal self-care (wellness) Current rates severity of symptoms, on a scale of 1-10 (10 is the most severe) 4 Context Family and social history was reviewed  Quality been intermittent without a consistent pattern.    CLINICAL MANEUVERING/INTERVENTION/SUPPORTIVE PSYCHOTHERAPY: Therapist continued to promote the therapeutic alliance, address the patient’s  issues, and strengthen self awareness, insights, and coping skills.   Discussed increase in anxiety which she associates with weather changes and pain increase.  Discussed goal setting and finding ways to focus on that goal.  Assisted the patient to identify the negative self talk predicting that things will turn out badly when she doesn't know for sure.  Once she identified the negative thoughts explored ways to challenge the thoughts. Gave handout on Mindfulness to review. Therapist applied CBT/REBT and Exploration of Coping Skills and encouraged the patient to use positive coping skills such as Exercising, Reframe the way you are thinking about the problem, Self Care (Take care of your body in a way that makes you feel good - paint your nails, do your hair, put on a face mask), Establish healthy boundaries , Walk away (leave a situation that is causing you stress), Make a list of choices (pros/cons) and Utilize resources/coping skills.  Therapist allowed Maggie  to freely discuss issues without interruption or judgment. Provided safe, confidential environment to facilitate the development of positive therapeutic relationship and encourage open, honest communication. Assisted patient in identifying increased risk factors which would indicate the need for higher level of care including thoughts to harm self or others, self-harming behavior, and/or binge drinking and encouraged patient to contact this office, call 911, or present to the nearest emergency room should any of these events occur. Discussed crisis intervention services and means to access.    Assessment          Psychiatric/Behavioral: Negative for agitation, behavioral problems, decreased concentration, hallucinations, self-injury, sleep disturbance, suicidal ideas, negative for hyperactivity. Positive for dysphoric mood and stress. The patient is nervous/anxious.      Mental Status Exam:    Hygiene:   good  Cooperation:  Cooperative  Eye Contact:   Good  Psychomotor Behavior:  Appropriate  Affect:  Restricted  Hopelessness: 2  Speech:  Normal  Thought Progress:  Linear  Thought Content:  Normal  Suicidal:  None  Homicidal:  None  Hallucinations:  None  Delusion:  None  Memory:  Intact  Orientation:  Person, Place, Time and Situation  Reliability:  good  Insight:  Good and Fair  Judgement:  Good and Fair  Impulse Control:  Good and Fair  Physical/Medical Issues:  no acute medical concern    Patient's Support Network Includes:  children and extended family    Progress toward goal: Not at goal    Functional Status: Moderate impairment     Overall: Anxious     VISIT DIAGNOSIS:     ICD-10-CM ICD-9-CM   1. MIRIAM (generalized anxiety disorder)  F41.1 300.02   2. Dysthymic disorder  F34.1 300.4        PROGNOSIS: fair if patient follows treatment recommendations    The patient appears to be mentally/physically stable compared to his baseline functioning.  However, they continues to present with a severe chronic mental illness. As a result,  they would be at significantly increased risk for decompensation and possibly higher level of care without continued treatment.  It is reasonable to assume they would considerably benefit from ongoing treatment.          PROGRESS TOWARD CURRENT PLAN OF CARE/TREATMENT PLAN:  Making Progress    SHORT-TERM GOALS: The patient will  will learn and practice at least 2 anxiety management techniques with goal of decreasing anxiety, will learn and practice at least 2 depression management techniques with goal of decreasing depression, will work with therapist to help expose and extinguish irrational beliefs and conclusions that contribute to anxiety/depression, will engage in one self-care activity daily, per self-report and will engage in one enjoyable activity daily, per self-report     LONG-TERM GOALS: With the help of therapy, I would like to:   learn how to structure my spare-time more meaningfully (hobbies, etc)  clarify or come to terms  with expectations or feelings related to my partner, spouse, or significant other, learn how to be more assertive with others and set appropriate boundaries and learn how to handle other people's reactions to my behavior (criticism, rejection, praise, etc.).  understand more clearly who I am, what I' m capable of, and what I want out of life  clarify my needs and desires and learn how to express them more effectively, learn how to adjust overly high expectations I have in myself or others, allow myself to experience feelings and express them more effectively and learn how to deal with strong negative feelings (e.g., anger, rage)  learn how to cope with negative thoughts, ruminations, or sense of guilt, find a way out of negative mood, sadness, or sense of inner emptiness, learn how be more organized in daily life and learn how to handle stressful situations better    STRENGTHS: Motivated for treatment, Literate, Articulate and Has insight    WEAKNESSES: Poor social support and Poor coping skills    Plan   Crisis Plan:  Symptoms and/or behaviors to indicate a crisis: Thinking about suicide    What calming techniques or other strategies will patient use to de-esclate and stay safe: slow down, breathe, visualize calming self, think it though, listen to music, change focus, take a walk  Who is one person patient can contact to assist with de-escalation? Daughter    Crisis Management: the Patient will contact staff or crisis line if symptoms exacerbate or if harm to self or others becomes a concern. Crisis resources include: Crisis Line [phone], 911, Local Law Enforcement, hospitals, [facility] 24/7 Emergency Room [phone].    PLAN:   Will continue in MONTHLY or AS NEEDED ongoing outpatient treatment via Face-to-Facewith primary therapist and pharmacotherapy as scheduled.   Will  report any adverse reactions to treatment/medication interventions immediately.  Will be compliant with treatment and appointments.    September 20, 2021 11:57 EDT    Recommended Referrals: Psychiatrist/APRN and Medical Provider (PCP)  Patient will adhere to medication regimen as prescribed and report any side effects. Patient will contact this office, call 911 or present to the nearest emergency room should suicidal or homicidal ideations occur. Provide Cognitive Behavioral Therapy and Solution Focused Therapy to improve functioning, maintain stability, and avoid decompensation and the need for higher level of care.          Future Appointments       Provider Department Center    9/20/2021 12:00 PM Yumiko Alejandre LCSW [facility]    10/25/2021 1:00 PM Haim Hill MD [facility]    3/21/2022 1:30 PM Daniel Ziegler MD [facility] OB GYN TAY                Yumiko Miles LCSW,  [facility]

## 2021-09-20 NOTE — TREATMENT PLAN
Multi-Disciplinary Problems (from Behavioral Health Treatment Plan)    Active Problems     Problem: Anxiety  Start Date: 05/11/20    Problem Details:  The patient self-scales this problem as a 4 with 10 being the worst.       Goal Priority Start Date Expected End Date End Date    Patient will develop and implement behavioral and cognitive strategies to reduce anxiety and irrational fears. -- 05/11/20  --    Goal Details:  Progress toward goal:  The patient self-scales their progress related to this goal as a 3 with 10 being the worst.       Goal Intervention Frequency Start Date End Date    Help patient explore past emotional issues in relation to present anxiety. Q2 Weeks 05/11/20     Intervention Details:  Duration of treatment until until remission of symptoms.       Goal Intervention Frequency Start Date End Date    Help patient develop an awareness of their cognitive and physical responses to anxiety. Q Month 05/11/20     Intervention Details:  Duration of treatment until until remission of symptoms.             Problem: Co-Dependency  Start Date: 05/11/20    Problem Details:  The patient self-scales this problem as a 8 with 10 being the worst.       Goal Priority Start Date Expected End Date End Date    Patient will demonstrate ability to set healthy boundaries and meet own needs within a relationship. -- 05/11/20  --    Goal Details:  Progress toward goal:  The patient self-scales their progress related to this goal as a 4-5 with 10 being the worst.   This has been more of a struggle.      Goal Intervention Frequency Start Date End Date    Assist patient in identifying enabling behaviors and healthy boundaries within relationships. Q2 Weeks 05/11/20     Intervention Details:  Duration of treatment until until remission of symptoms.             Problem: Depression  Start Date: 05/11/20    Problem Details:  The patient self-scales this problem as a 4 with 10 being the worst.       Goal Priority  "Start Date Expected End Date End Date    Patient will demonstrate the ability to initiate new constructive life skills outside of sessions on a consistent basis. -- 05/11/20  --    Goal Details:  Progress toward goal:  The patient self-scales their progress related to this goal as a 3 with 10 being the worst.  This has gone up and down but is better as she finds things she can do, celebrate recovery or alanon which she enjoys online.       Goal Intervention Frequency Start Date End Date    Assist patient in setting attainable activities of daily living goals. PRN 05/11/20     Goal Intervention Frequency Start Date End Date    Provide education about depression PRN 05/11/20     Intervention Details:  Duration of treatment until until remission of symptoms.       Goal Intervention Frequency Start Date End Date    Assist patient in developing healthy coping strategies. PRN 05/11/20     Intervention Details:  Duration of treatment until until remission of symptoms.                     Continues to experience frustrations with living with husbands alcohol use.  She is attending 12 step support for  this which is helpful.  Has experienced more days of \"acceptance\" regarding things out of her control.  Continues  To sew and quilt.  Finds the therapeutic process helpful for assisting her to maintain positive progress and prevent deterioration of her mood.   I have discussed and reviewed this treatment plan with the patient.        "

## 2021-10-25 ENCOUNTER — OFFICE VISIT (OUTPATIENT)
Dept: PSYCHIATRY | Facility: CLINIC | Age: 71
End: 2021-10-25

## 2021-10-25 VITALS
DIASTOLIC BLOOD PRESSURE: 78 MMHG | WEIGHT: 171 LBS | TEMPERATURE: 97.4 F | BODY MASS INDEX: 32.28 KG/M2 | HEIGHT: 61 IN | SYSTOLIC BLOOD PRESSURE: 130 MMHG | HEART RATE: 58 BPM | OXYGEN SATURATION: 99 %

## 2021-10-25 DIAGNOSIS — F41.1 GAD (GENERALIZED ANXIETY DISORDER): ICD-10-CM

## 2021-10-25 DIAGNOSIS — F34.1 DYSTHYMIC DISORDER: Primary | ICD-10-CM

## 2021-10-25 PROCEDURE — 99214 OFFICE O/P EST MOD 30 MIN: CPT | Performed by: PSYCHIATRY & NEUROLOGY

## 2021-10-25 RX ORDER — DULOXETIN HYDROCHLORIDE 60 MG/1
60 CAPSULE, DELAYED RELEASE ORAL DAILY
Qty: 90 CAPSULE | Refills: 0 | Status: SHIPPED | OUTPATIENT
Start: 2021-10-25 | End: 2022-02-28 | Stop reason: SDUPTHER

## 2021-10-25 RX ORDER — ESZOPICLONE 3 MG/1
3 TABLET, FILM COATED ORAL NIGHTLY PRN
Qty: 90 TABLET | Refills: 0 | Status: SHIPPED | OUTPATIENT
Start: 2021-10-25 | End: 2022-01-20 | Stop reason: SDUPTHER

## 2021-10-25 NOTE — PROGRESS NOTES
"Patient ID: Maggie Emery is a 71 y.o. female    SERVICE TYPE: EVALUATION AND MANAGEMENT (greater than 50% of the time spent for supportive psychotherapy).      /78   Pulse 58   Temp 97.4 °F (36.3 °C)   Ht 156.2 cm (61.5\")   Wt 77.6 kg (171 lb)   SpO2 99%   BMI 31.79 kg/m²     ALLERGIES:  Ambien [zolpidem tartrate], Darvon [propoxyphene], Hydrocodone, and Ibuprofen    CC/ Focus of the visit: depression.      HPI: Had a successful trip to [place] with family, no real difference in her home situation. Visiting you daughter in [place] mid week follow by her [employer] class in [place]. Son returning home from [place] soon. Sounds like she been in touch with her children and their children, staying involved, healthy for her. Sleeping perhaps 7 hours nightly, no recurrence of intense clinically depression.      PFSH: no changes in the     Review of Systems  No cardiopulmonary, GI or neurological complaints.    SUPPORTIVE PSYCHOTHERAPY: continuing efforts to promote the therapeutic alliance, address the patient’s issues, and strengthen self awareness, insights, and positive coping skills such as Exercising, listen to music, spending time in nature and utilizing resources.  Encouraging patient to return to 12-step or celebrate recovery group participation when available.  Patient remains focused on how she deals with and becomes distressed with her antagonistic .    Mental Status Exam  Appearance:  Appropriate.   Attitude toward clinician:  cooperative and agreeable   Speech:    Rate:  regular rate and rhythm   Volume: normal  Motor:  no abnormal movements   Mood:  Good  Affect:  euthymic  Thought Processes:  linear, logical, and goal directed  Thought Content:  Normal   Feeling Hopeless: absent  Suicidal Thoughts or Intent:  absent  Homicidal Thoughts:  absent  Perceptual Disturbance: no perceptual disturbance  Attention and Concentration:  good  Insight and Judgement:  good  Memory:  memory appears to be " intact    LABS: No results found for this or any previous visit (from the past 168 hour(s)).    MEDICATION ISSUES: Have discussed with the patient the medications Risks, Benefits, and Side effects including potential falls, possible impaired driving and  metabolic adversities among others. No medication side effects or related complaints today.     TREATMENT PLAN/GOALS: Continue supportive psychotherapy efforts and medications as indicated.     Current Outpatient Medications   Medication Sig Dispense Refill   • amoxicillin-clavulanate (AUGMENTIN) 875-125 MG per tablet      • atenolol (TENORMIN) 50 MG tablet Take 50 mg by mouth Daily.     • Diclofenac Sodium (Voltaren) 1 % gel gel Voltaren 1 % topical gel   APPLY 2 GRAMS TO THE AFFECTED AREA(S) BY TOPICAL ROUTE 4 TIMES PER DAY     • diltiaZEM CD (CARTIA XT) 300 MG 24 hr capsule Take 300 mg by mouth Daily.     • DULoxetine (CYMBALTA) 60 MG capsule Take 1 capsule by mouth Daily. 90 capsule 0   • eszopiclone (Lunesta) 3 MG tablet Take 1 tablet by mouth At Night As Needed for Sleep. Take immediately before bedtime 90 tablet 0   • eszopiclone (Lunesta) 3 MG tablet Take 1 tablet by mouth At Night As Needed for Sleep. Take immediately before bedtime  Indications: Trouble Sleeping 90 tablet 0   • fluticasone (Flonase) 50 MCG/ACT nasal spray Flonase Allergy Relief 50 mcg/actuation nasal spray,suspension   2 sprays each nostril     • fluticasone (VERAMYST) 27.5 MCG/SPRAY nasal spray 2 sprays into each nostril Daily.     • lansoprazole (PREVACID) 30 MG capsule Take 30 mg by mouth Daily.     • losartan-hydrochlorothiazide (HYZAAR) 100-25 MG per tablet Take 1 tablet by mouth Daily.     • meclizine (ANTIVERT) 12.5 MG tablet      • Potassium Chloride (KCL-20 PO) Take 20 tablets by mouth Daily.     • pravastatin (PRAVACHOL) 80 MG tablet Daily.     • rizatriptan MLT (MAXALT-MLT) 5 MG disintegrating tablet Take 5 mg by mouth 1 (One) Time As Needed for migraine. May repeat in 2 hours  if needed       No current facility-administered medications for this visit.     CTS last week left - doing well.     No apparent misuse or abuse of prescriptions or other substances apparent.  Checked Cem.    COLLATERAL PSYCHOTHERAPEUTIC INTERVENTION: to see Yumiko Palencia LCSW in several weeks, will try to schedule bi monthly. .     RISK:  moderate    Assessment/Plan     Diagnoses and all orders for this visit:    1. Dysthymic disorder (Primary)  -     eszopiclone (Lunesta) 3 MG tablet; Take 1 tablet by mouth At Night As Needed for Sleep. Take immediately before bedtime  Indications: Trouble Sleeping  Dispense: 90 tablet; Refill: 0  Patient continues the Cymbalta 60 mg daily.    2. MIRIAM (generalized anxiety disorder)  -     eszopiclone (Lunesta) 3 MG tablet; Take 1 tablet by mouth At Night As Needed for Sleep. Take immediately before bedtime  Indications: Trouble Sleeping  Dispense: 90 tablet; Refill: 0        Return in about 18 weeks (around 2/28/2022).           Patient knows to call if symptoms worsen or fail to improve between appointments.    I spent 30 minutes caring for Maggie on this date of service. This time includes time spent by me in the following activities: Patient evaluation, support psychotherapy, decisions, medications, and documentation.     Dictated utilizing [email or web address] dictation    MAKSIM Hill MD

## 2021-11-09 ENCOUNTER — OFFICE VISIT (OUTPATIENT)
Dept: PSYCHIATRY | Facility: CLINIC | Age: 71
End: 2021-11-09

## 2021-11-09 DIAGNOSIS — F41.1 GAD (GENERALIZED ANXIETY DISORDER): Primary | ICD-10-CM

## 2021-11-09 DIAGNOSIS — F34.1 DYSTHYMIC DISORDER: ICD-10-CM

## 2021-11-09 PROCEDURE — 90837 PSYTX W PT 60 MINUTES: CPT | Performed by: SOCIAL WORKER

## 2021-11-09 NOTE — PROGRESS NOTES
Date of Service: November 9, 2021  Time In: 1:00 pm  Time Out: 1:55 pm      PROGRESS NOTE  Data:The patient is a 71 y.o. person who met 1:1 with Yumiko Miles LCSW, LCADC for regularly scheduled individual session.Verified the patients identity.    Maggie presents for session on time, clean and casually dressed  without evidence of intoxication, withdrawal, or perceptual disturbance.   The patient was anxious.      Chief Compliant: Patient presents with anxiety and depression    Interactive Complexity: Interactive Complexity No If yes, due to:       HPI: Patient shares that she is continuing to have struggles with anxiety/depression especially when life stress increases.  Shares current projects and progress.  Has been seeing daughter and helping with childcare in [place].  Husbands drinking continues. Shares current increase in anxiety.  Depression Low mood for > 2 weeks, Loss of Interest, Feelings of guilt/worthlessness and Low energy  Generalized Anxiety  Excess Worry, Restless/Edgy, Easily fatigued, Muscle tension and Decreased sleep. Onset of symptoms was vague.  Symptoms are associated with relationship problem with unchanged since last visit and lack of support.  Symptoms are aggravated by anxiety, sadness and stress.   Symptoms improve with medication management, therapy, support groups and personal self-care (wellness) Current rates severity of symptoms, on a scale of 1-10 (10 is the most severe) 5 Context Family and social history was reviewed  Quality been intermittent without a consistent pattern.    CLINICAL MANEUVERING/INTERVENTION/SUPPORTIVE PSYCHOTHERAPY: Therapist continued to promote the therapeutic alliance, address the patient’s issues, and strengthen self awareness, insights, and coping skills. Discussed her thoughts/feelings about husbands addition and how this is impacting her. Explored healthy coping skills and what the patient can do to manage the emotional distress she is living  "with.  Patient shares that celebrate recovery in person may start soon which she thinks will be helpful.  Has been walking and has continued to quilt and give herself permission to \"be ok with whatever progress she makes on the projects she is working on completing\".  Has had some success at setting boundaries and learning to \"accept how others parent the grandchildren\".  Praised the patient for this insight and for her ability to continue to work on \"acceptance\" of husbands behaviors and deterioration as he continues to drink.  She has noticed a somewhat predictable pattern of his drinking and is continuing to challenge the negative cognitions that are triggered when he is unkind to her.  Assisted the patient to problem solve through the dilemma of wanting to see brother/sister and college friend and how she can find the money to take the trip-in the process identification of \"predicting something wont work out for her\" as the limiting belief and encouraged her to challenge the thoughts when she notices them so that she can continue to problem solve and not stay \"stuck in the emotional distress\" that is triggered.    Therapist applied CBT/REBT, Exploration of Coping Skills and Interactive Feedback and encouraged the patient to use positive coping skills such as Reframe the way you are thinking about the problem, Time management (Work on managing your time better - for example, turn off the alerts on your phone) and Utilize resources/coping skills.  Therapist allowed Maggie  to freely discuss issues without interruption or judgment. Provided safe, confidential environment to facilitate the development of positive therapeutic relationship and encourage open, honest communication. Assisted patient in identifying increased risk factors which would indicate the need for higher level of care including thoughts to harm self or others, self-harming behavior, and/or binge drinking and encouraged patient to contact this " office, call 911, or present to the nearest emergency room should any of these events occur. Discussed crisis intervention services and means to access.    Assessment          Psychiatric/Behavioral: Negative for agitation, behavioral problems, decreased concentration, dysphoric mood, hallucinations, self-injury, sleep disturbance, suicidal ideas, negative for hyperactivity. Positive for depressed mood and stress. The patient is nervous/anxious.      Mental Status Exam:    Hygiene:   good  Cooperation:  Cooperative  Eye Contact:  Good  Psychomotor Behavior:  Appropriate  Affect:  Appropriate  Hopelessness: Denies  Speech:  Normal  Linear  Thought Content:  Normal  Suicidal:  None  Homicidal:  None  Hallucinations:  None  Delusion:  None  Memory:  Intact  Orientation:  Person, Place, Time and Situation  Reliability:  good  Insight:  Good and Fair  Judgement:  Good and Fair  Impulse Control:  Good and Fair    Patient's Support Network Includes:  children and extended family    Progress toward goal: Not at goal    Functional Status: Moderate impairment     Overall: Anxious     VISIT DIAGNOSIS:     ICD-10-CM ICD-9-CM   1. MIRIAM (generalized anxiety disorder)  F41.1 300.02   2. Dysthymic disorder  F34.1 300.4        PROGNOSIS: good if patient follows treatment recommendations    The patient appears to be mentally/physically stable compared to his baseline functioning.  However, they continues to present with a severe chronic mental illness. As a result,  they would be at significantly increased risk for decompensation and possibly higher level of care without continued treatment.  It is reasonable to assume they would considerably benefit from ongoing treatment.          PROGRESS TOWARD CURRENT PLAN OF CARE/TREATMENT PLAN:  Making Progress    SHORT-TERM GOALS: The patient will  will learn and practice at least 2 anxiety management techniques with goal of decreasing anxiety, will learn and practice at least 2 depression  management techniques with goal of decreasing depression, will work with therapist to help expose and extinguish irrational beliefs and conclusions that contribute to anxiety/depression, will work with therapist to identify conflicts from the past and the present that form the basis, will engage in one self-care activity daily, per self-report and will engage in one enjoyable activity daily, per self-report     LONG-TERM GOALS: With the help of therapy, I would like to:   learn how to structure my spare-time more meaningfully (hobbies, etc) and become calmer and more laid-back  clarify or come to terms with expectations or feelings related to my partner, spouse, or significant other, learn how to be more assertive with others and set appropriate boundaries and learn how to handle other people's reactions to my behavior (criticism, rejection, praise, etc.).  understand more clearly who I am, what I' m capable of, and what I want out of life  clarify my needs and desires and learn how to express them more effectively, allow myself to experience feelings and express them more effectively and learn how to deal with strong negative feelings (e.g., anger, rage)  learn how to cope with negative thoughts, ruminations, or sense of guilt, find a way out of negative mood, sadness, or sense of inner emptiness, learn how be more organized in daily life and learn how to handle stressful situations better    STRENGTHS: Motivated for treatment, Literate and Articulate    WEAKNESSES: Poor social support and Poor coping skills    Plan   Crisis Plan:  Symptoms and/or behaviors to indicate a crisis: Thinking about suicide    What calming techniques or other strategies will patient use to de-esclate and stay safe: slow down, breathe, visualize calming self, think it though, listen to music, change focus, take a walk  Who is one person patient can contact to assist with de-escalation? Daughter or son    Crisis Management: the Patient  will contact staff or crisis line if symptoms exacerbate or if harm to self or others becomes a concern. Crisis resources include: Crisis Line [phone], 911, Local Law Enforcement, [facility], [facility] 24/7 Emergency Room [phone].    PLAN:   Will continue in MONTHLY ongoing outpatient treatment via Face-to-Facewith primary therapist and pharmacotherapy as scheduled.   Will  report any adverse reactions to treatment/medication interventions immediately.  Will be compliant with treatment and appointments.   November 9, 2021 13:02 EST    Recommended Referrals: Psychiatrist/APRN and Medical Provider (PCP)  Patient will adhere to medication regimen as prescribed and report any side effects. Patient will contact this office, call 911 or present to the nearest emergency room should suicidal or homicidal ideations occur. Provide Cognitive Behavioral Therapy and Solution Focused Therapy to improve functioning, maintain stability, and avoid decompensation and the need for higher level of care.          Future Appointments       Provider Department Center    11/9/2021 2:00 PM Yumiko Alejandre LCSW [facility]    11/23/2021 10:00 AM Yumiko Alejandre LCSW [facility]    12/7/2021 10:00 AM Yumiko Alejandre LCSW [facility]    12/21/2021 10:00 AM Yumiko Alejandre LCSW [facility]    2/28/2022 12:30 PM Haim Hill MD [facility]    3/21/2022 1:30 PM Daniel Ziegler MD [facility]                Yumiko Miles LCSW,  [facility][note 3]

## 2021-11-09 NOTE — TREATMENT PLAN
Multi-Disciplinary Problems (from Behavioral Health Treatment Plan)    Active Problems     Problem: Anxiety  Start Date: 05/11/20    Problem Details:  The patient self-scales this problem as a 4 with 10 being the worst.       Goal Priority Start Date Expected End Date End Date    Patient will develop and implement behavioral and cognitive strategies to reduce anxiety and irrational fears. -- 05/11/20  --    Goal Details:  Progress toward goal:  The patient self-scales their progress related to this goal as a 3-6 with 10 being the worst this depends of the life stressors that are happening.  Overall she is making progress and having success at times being able to challenge the negative thoughts that happen       Goal Intervention Frequency Start Date End Date    Help patient explore past emotional issues in relation to present anxiety. Q2 Weeks 05/11/20     Intervention Details:  Duration of treatment until until remission of symptoms.       Goal Intervention Frequency Start Date End Date    Help patient develop an awareness of their cognitive and physical responses to anxiety. Q Month 05/11/20     Intervention Details:  Duration of treatment until until remission of symptoms.             Problem: Co-Dependency  Start Date: 05/11/20    Problem Details:  The patient self-scales this problem as a 8 with 10 being the worst.       Goal Priority Start Date Expected End Date End Date    Patient will demonstrate ability to set healthy boundaries and meet own needs within a relationship. -- 05/11/20  --    Goal Details:  Progress toward goal:  The patient self-scales their progress related to this goal as a 4-5 with 10 being the worst.   This has been more of a struggle at times but still trending in a positive direction.     Goal Intervention Frequency Start Date End Date    Assist patient in identifying enabling behaviors and healthy boundaries within relationships. Q2 Weeks 05/11/20     Intervention  "Details:  Duration of treatment until until remission of symptoms.             Problem: Depression  Start Date: 05/11/20    Problem Details:  The patient self-scales this problem as a 4 with 10 being the worst.       Goal Priority Start Date Expected End Date End Date    Patient will demonstrate the ability to initiate new constructive life skills outside of sessions on a consistent basis. -- 05/11/20  --    Goal Details:  Progress toward goal:  The patient self-scales their progress related to this goal as a 3 with 10 being the worst.  This has gone up and down but is better as she finds things she can do, celebrate recovery or alanon which she enjoys online.         Goal Intervention Frequency Start Date End Date    Assist patient in setting attainable activities of daily living goals. PRN 05/11/20     Goal Intervention Frequency Start Date End Date    Provide education about depression PRN 05/11/20     Intervention Details:  Duration of treatment until until remission of symptoms.       Goal Intervention Frequency Start Date End Date    Assist patient in developing healthy coping strategies. PRN 05/11/20     Intervention Details:  Duration of treatment until until remission of symptoms.                     Has been helping out with grandchildren in [place] at times, and gives her purpose.  Has been quilting which she enjoys.  Continues to experience frustrations with living with husbands alcohol use and memory issues.  She is attending 12 step support for this which is helpful.  Has experienced more days of \"acceptance\" regarding things out of her control.  Continues To sew and quilt.  Finds the therapeutic process helpful for assisting her to maintain positive progress and prevent deterioration of her mood.   I have discussed and reviewed this treatment plan with the patient.           "

## 2021-11-23 ENCOUNTER — OFFICE VISIT (OUTPATIENT)
Dept: PSYCHIATRY | Facility: CLINIC | Age: 71
End: 2021-11-23

## 2021-11-23 DIAGNOSIS — F41.1 GAD (GENERALIZED ANXIETY DISORDER): Primary | ICD-10-CM

## 2021-11-23 DIAGNOSIS — F34.1 DYSTHYMIC DISORDER: ICD-10-CM

## 2021-11-23 PROCEDURE — 90837 PSYTX W PT 60 MINUTES: CPT | Performed by: SOCIAL WORKER

## 2021-11-23 NOTE — PROGRESS NOTES
Date of Service: November 23, 2021  Time In: 10:00 am  Time Out: 10:55 am      PROGRESS NOTE  Data:The patient is a 71 y.o. person who [facility] for [facility] scheduled individual session.Verified the patients identity.  Maggie presents for session on time, clean and casually dressed  without evidence of intoxication, withdrawal, or perceptual disturbance.   The patient was negative/pessimistic disposition.      Chief Compliant: Patient presents with anxiety & depression    Interactive Complexity: Interactive Complexity No If yes, due to:       HPI: Patient shares that today is not a good day.  The patient shares that her  found out that she took a 100 dollars out of the joint account and then moved the [id number] out and put it in another account.  Patient shares that she has had trouble with her vehicle and asked her  to take one of his 2 cars.  He became angry and told her she could not take the car but she went ahead and just took the car.  In the car she found the check books and a statement on another account she did not know about.  The patient shares that  is drunk because she took the 100 dollars out of the joint account.   She shares that she knows that there will be big argument when she gets home. Concerned about granddaughter who is acting out and has not been able to get an appointment with a child psychologist- which is concerning.  Patient shares that her overall stress is negatively impacting her.  Depression Feelings of guilt/worthlessness and Low energy  Generalized Anxiety  Excess Worry, Restless/Edgy, Easily fatigued and Muscle tension. Onset of symptoms was vague.  Symptoms are associated with relationship problem with  drinking, financial burdens and lack of support.  Symptoms are aggravated by anxiety, lonely, sadness and stress.   Symptoms improve with medication management, therapy, personal self-care (wellness) and 12-step  "programs Current rates severity of symptoms, on a scale of 1-10 (10 is the most severe) 8 Context Family and social history was reviewed  Quality worse when  is drinking/drunk.    CLINICAL MANEUVERING/INTERVENTION/SUPPORTIVE PSYCHOTHERAPY: Therapist continued to promote the therapeutic alliance, address the patient’s issues, and strengthen self awareness, insights, and coping skills.  Processing patients \"not having a good day\".    Allowing the patient to shares freely and offering unconditional positive regard.   Assisting the patient to notice the \"should statements\" that she uses and how to challenge these thoughts.  Reviewed how to set and maintain boundaries and accept what she cannot control.  Discussed self care and how to increase self care as stressors rise through the holidays.  Asked the patient what she can do today to help her feel good about herself.  Praised the patient for not calling & talking to children about her husbands behaviors. Therapist applied CBT/REBT, Cognitive Challenging and Interactive Feedback and encouraged the patient to use positive coping skills such as Exercising, Drawing/Art, Distraction, Reframe the way you are thinking about the problem, Self Care (Take care of your body in a way that makes you feel good - paint your nails, do your hair, put on a face mask), Establish healthy boundaries , Walk away (leave a situation that is causing you stress), Use positive self-talk, Keep a positive attitude, Keep calm by thinking and Utilize resources/coping skills.  Therapist allowed Maggie  to freely discuss issues without interruption or judgment. Provided safe, confidential environment to facilitate the development of positive therapeutic relationship and encourage open, honest communication. Assisted patient in identifying increased risk factors which would indicate the need for higher level of care including thoughts to harm self or others, self-harming behavior, and/or binge " drinking and encouraged patient to contact this office, call 911, or present to the nearest emergency room should any of these events occur. Discussed crisis intervention services and means to access.    Assessment          Psychiatric/Behavioral: Negative for agitation, behavioral problems, decreased concentration, dysphoric mood, hallucinations, self-injury, sleep disturbance, suicidal ideas, negative for hyperactivity. Positive for depressed mood, decreased concentration, and stress. The patient is nervous/anxious.      Mental Status Exam:    Hygiene:   good  Cooperation:  Cooperative  Eye Contact:  Good  Psychomotor Behavior:  Aggitated  Affect:  Appropriate  Hopelessness: Denies  Speech:  Normal  Linear  Thought Content:  Normal  Suicidal:  None  Homicidal:  None  Hallucinations:  None  Delusion:  None  Memory:  Intact  Orientation:  Person, Place, Time and Situation  Reliability:  good  Insight:  Good  Judgement:  Good  Impulse Control:  Good    Patient's Support Network Includes:  children    Progress toward goal: Not at goal    Functional Status: Moderate impairment     Overall: Anxious     VISIT DIAGNOSIS:     ICD-10-CM ICD-9-CM   1. MIRIAM (generalized anxiety disorder)  F41.1 300.02   2. Dysthymic disorder  F34.1 300.4        PROGNOSIS: fair if patient follows treatment recommendations    The patient appears to be mentally/physically stable compared to his baseline functioning.  However, they continues to present with a severe chronic mental illness. As a result,  they would be at significantly increased risk for decompensation and possibly higher level of care without continued treatment.  It is reasonable to assume they would considerably benefit from ongoing treatment.          PROGRESS TOWARD CURRENT PLAN OF CARE/TREATMENT PLAN:  Making Progress    SHORT-TERM GOALS: The patient will  will learn and practice at least 2 anxiety management techniques with goal of decreasing anxiety, will work with therapist  to help expose and extinguish irrational beliefs and conclusions that contribute to anxiety/depression, will work with therapist to identify conflicts from the past and the present that form the basis, will engage in one self-care activity daily, per self-report and will engage in one enjoyable activity daily, per self-report     LONG-TERM GOALS: With the help of therapy, I would like to:   become calmer and more laid-back and learn how to enjoy life and have fun  clarify or come to terms with expectations or feelings related to my partner, spouse, or significant other, learn how to be more assertive with others and set appropriate boundaries and learn how to handle other people's reactions to my behavior (criticism, rejection, praise, etc.).  understand more clearly who I am, what I' m capable of, and what I want out of life  clarify my needs and desires and learn how to express them more effectively, learn how to adjust overly high expectations I have in myself or others, allow myself to experience feelings and express them more effectively and learn how to deal with strong negative feelings (e.g., anger, rage)  learn how to cope with negative thoughts, ruminations, or sense of guilt, find a way out of negative mood, sadness, or sense of inner emptiness, learn how be more organized in daily life and learn how to handle stressful situations better    STRENGTHS: Motivated for treatment, Literate and Articulate    WEAKNESSES: Poor social support and Poor coping skills    Plan   Crisis Plan:  Symptoms and/or behaviors to indicate a crisis: Thinking about suicide    What calming techniques or other strategies will patient use to de-esclate and stay safe: slow down, breathe, visualize calming self, think it though, listen to music, change focus, take a walk  Who is one person patient can contact to assist with de-escalation? Daughter    Crisis Management: the Patient will contact staff or crisis line if symptoms  exacerbate or if harm to self or others becomes a concern. Crisis resources include: Crisis Line [phone], 91, Local Law Enforcement, [facility], [facility] 24/7 Emergency Room [phone].    PLAN:   Will continue in BI-WEEKLY or AS NEEDED ongoing outpatient treatment via Face-to-Facewith primary therapist and pharmacotherapy as scheduled.   Will  report any adverse reactions to treatment/medication interventions immediately.  Will be compliant with treatment and appointments.   November 23, 2021 10:09 EST    Recommended Referrals: Psychiatrist/APRN  Patient will adhere to medication regimen as prescribed and report any side effects. Patient will contact this office, call 911 or present to the nearest emergency room should suicidal or homicidal ideations occur. Provide Cognitive Behavioral Therapy and Solution Focused Therapy to improve functioning, maintain stability, and avoid decompensation and the need for higher level of care.          Future Appointments       Provider Department Center    12/7/2021 10:00 AM Yumiko Alejandre LCSW [facility]    12/21/2021 10:00 AM Yumiko Alejandre LCSW [facility]    1/6/2022 2:00 PM Yumiko Alejandre LCSW [facility]    2/28/2022 12:30 PM Haim Hill MD [facility]    3/21/2022 1:30 PM Daniel Ziegler MD [facility]                Yumiko Miles LCSW,  [facility]

## 2021-12-21 ENCOUNTER — OFFICE VISIT (OUTPATIENT)
Dept: PSYCHIATRY | Facility: CLINIC | Age: 71
End: 2021-12-21

## 2021-12-21 DIAGNOSIS — F34.1 DYSTHYMIC DISORDER: ICD-10-CM

## 2021-12-21 DIAGNOSIS — F41.1 GAD (GENERALIZED ANXIETY DISORDER): Primary | ICD-10-CM

## 2021-12-21 PROCEDURE — 90837 PSYTX W PT 60 MINUTES: CPT | Performed by: SOCIAL WORKER

## 2021-12-21 NOTE — PROGRESS NOTES
"Date of Service: December 21, 2021  Time In: 8:49 am  Time Out: 9:44 am    PROGRESS NOTE  Data:The patient is a 71 y.o. person who met 1:[facility] for regularly scheduled individual session.Verified the patients identity.  Magige presents for session on time, clean and casually dressed  without evidence of intoxication, withdrawal, or perceptual disturbance.   The patient was open and engaged.      Chief Compliant: Patient presents with     Interactive Complexity: Interactive Complexity No If yes, due to:       HPI: Patient shares that she might have been exposed to CVOID by granddaughter who has a cold and a slight fever.  Has been able to change her attitude about letting go of her \"wanting it done\" and using acceptance.  Did not decorate her house very much this year.   will not help her decorate or bring things in from her shed.  Has been working on her projects to complete.  Still frustrated about not having the room to spread things out but is till able to accomplish tasks.  Depression Low mood for > 2 weeks, Low energy and Psychomotor slowing  Generalized Anxiety  Excess Worry, Restless/Edgy, Easily fatigued and Muscle tension. Onset of symptoms was vague.  Symptoms are associated with relationship problem with unchanged since last visit, financial burdens and lack of support.  Symptoms are aggravated by anxiety and weight management.   Symptoms improve with medication management, therapy and personal self-care (wellness) Current rates severity of symptoms, on a scale of 1-10 (10 is the most severe) 5 Context Family and social history was reviewed  Quality been intermittent without a consistent pattern.    CLINICAL MANEUVERING/INTERVENTION/SUPPORTIVE PSYCHOTHERAPY: Therapist continued to promote the therapeutic alliance, address the patient’s issues, and strengthen self awareness, insights, and coping skills.  Explored ways she can manage emotional distress form the " holidays.   Actively listened while the patient shares some fears and normalized her epressions. Reviewed the CBT process.  Therapist applied CBT/REBT and Exploration of Coping Skills and encouraged the patient to use positive coping skills such as Reframe the way you are thinking about the problem, Self Care (Take care of your body in a way that makes you feel good - paint your nails, do your hair, put on a face mask), Keep a positive attitude and Utilize resources/coping skills.  Therapist allowed Maggie  to freely discuss issues without interruption or judgment. Provided safe, confidential environment to facilitate the development of positive therapeutic relationship and encourage open, honest communication. Assisted patient in identifying increased risk factors which would indicate the need for higher level of care including thoughts to harm self or others, self-harming behavior, and/or binge drinking and encouraged patient to contact this office, call 911, or present to the nearest emergency room should any of these events occur. Discussed crisis intervention services and means to access.    Assessment          Psychiatric/Behavioral: Negative for agitation, behavioral problems, decreased concentration,  hallucinations, self-injury, sleep disturbance, suicidal ideas, negative for hyperactivity. Positive for dysphoric mood, depressed mood and stress. The patient is nervous/anxious.      Mental Status Exam:    Hygiene:   good  Cooperation:  Cooperative  Eye Contact:  Good  Psychomotor Behavior:  Appropriate  Affect:  Appropriate  Hopelessness: 2  Speech:  Normal  Goal directed and Linear  Thought Content:  Normal  Suicidal:  None  Homicidal:  None  Hallucinations:  None  Delusion:  None  Memory:  Intact  Orientation:  Person, Place, Time and Situation  Reliability:  good  Insight:  Good and Fair  Judgement:  Good and Fair  Impulse Control:  Good    Patient's Support Network Includes:  children and extended  family    Progress toward goal: Not at goal    Functional Status: Moderate impairment     Overall: Anxious     VISIT DIAGNOSIS:     ICD-10-CM ICD-9-CM   1. MIRIAM (generalized anxiety disorder)  F41.1 300.02   2. Dysthymic disorder  F34.1 300.4        PROGNOSIS: fair if patient follows treatment recommendations    The patient appears to be mentally/physically stable compared to his baseline functioning.  However, they continues to present with a severe chronic mental illness. As a result,  they would be at significantly increased risk for decompensation and possibly higher level of care without continued treatment.  It is reasonable to assume they would considerably benefit from ongoing treatment.          PROGRESS TOWARD CURRENT PLAN OF CARE/TREATMENT PLAN:  Making Progress    SHORT-TERM GOALS: The patient will  will learn and practice at least 2 anxiety management techniques with goal of decreasing anxiety, will learn and practice at least 2 depression management techniques with goal of decreasing depression, will work with therapist to help expose and extinguish irrational beliefs and conclusions that contribute to anxiety/depression, will work with therapist to identify conflicts from the past and the present that form the basis, will engage in one self-care activity daily, per self-report and will engage in one enjoyable activity daily, per self-report     LONG-TERM GOALS: With the help of therapy, I would like to:   learn how to enjoy life and have fun  clarify or come to terms with expectations or feelings related to my partner, spouse, or significant other, learn how to be more assertive with others and set appropriate boundaries, learn how to handle other people's reactions to my behavior (criticism, rejection, praise, etc.). and learn how to connect with other people (and how to maintain relationships)  understand more clearly who I am, what I' m capable of, and what I want out of life  learn how to finish  projects I've started, allow myself to experience feelings and express them more effectively and learn how to deal with strong negative feelings (e.g., anger, rage)  learn how to cope with negative thoughts, ruminations, or sense of guilt, find a way out of negative mood, sadness, or sense of inner emptiness, learn how to cope with  physical illness, learn how be more organized in daily life and learn how to handle stressful situations better    STRENGTHS: Motivated for treatment, Literate and Articulate    WEAKNESSES: Poor social support and Poor coping skills    Plan   Crisis Plan:  Symptoms and/or behaviors to indicate a crisis: Thinking about suicide    What calming techniques or other strategies will patient use to de-esclate and stay safe: slow down, breathe, visualize calming self, think it though, listen to music, change focus, take a walk  Who is one person patient can contact to assist with de-escalation? Daughter    Crisis Management: the Patient will contact staff or crisis line if symptoms exacerbate or if harm to self or others becomes a concern. Crisis resources include: Crisis Line [phone], 911, Local Law Enforcement, [facility], [facility] 24/7 Emergency Room [phone].    PLAN:   Will continue in MONTHLY or AS NEEDED ongoing outpatient treatment via Face-to-Facewith primary therapist and pharmacotherapy as scheduled.   Will  report any adverse reactions to treatment/medication interventions immediately.  Will be compliant with treatment and appointments.   December 21, 2021 09:48 EST    Recommended Referrals: Psychiatrist/APRN and Medical Provider (PCP)  Patient will adhere to medication regimen as prescribed and report any side effects. Patient will contact this office, call 911 or present to the nearest emergency room should suicidal or homicidal ideations occur. Provide Cognitive Behavioral Therapy and Solution Focused Therapy to improve functioning, maintain stability, and avoid  decompensation and the need for higher level of care.          Future Appointments       Provider Department Center    12/21/2021 10:00 AM Yumiko Alejandre LCSW [facility]    1/6/2022 2:00 PM Yumiko Alejandre LCSW [facility]    1/27/2022 11:00 AM Yumiko Alejandre LCSW [facility]    2/28/2022 12:30 PM Haim Hill MD [facility]    3/21/2022 1:30 PM Daniel Ziegler MD [facility]                Yumiko Miles LCSW,  [facility]

## 2022-01-19 DIAGNOSIS — F34.1 DYSTHYMIC DISORDER: ICD-10-CM

## 2022-01-20 RX ORDER — ESZOPICLONE 3 MG/1
3 TABLET, FILM COATED ORAL NIGHTLY PRN
Qty: 90 TABLET | Refills: 0 | Status: SHIPPED | OUTPATIENT
Start: 2022-01-20 | End: 2022-04-18 | Stop reason: SDUPTHER

## 2022-01-27 ENCOUNTER — OFFICE VISIT (OUTPATIENT)
Dept: PSYCHIATRY | Facility: CLINIC | Age: 72
End: 2022-01-27

## 2022-01-27 DIAGNOSIS — F41.1 GAD (GENERALIZED ANXIETY DISORDER): Primary | ICD-10-CM

## 2022-01-27 DIAGNOSIS — F34.1 DYSTHYMIC DISORDER: ICD-10-CM

## 2022-01-27 PROCEDURE — 90837 PSYTX W PT 60 MINUTES: CPT | Performed by: SOCIAL WORKER

## 2022-01-27 NOTE — PROGRESS NOTES
"Date of Service: January 27, 2022  Time In: 11:03 am  Time Out: 11:59 am    PROGRESS NOTE  Data:The patient is a 72 y.o. person who met 1:1 with Yumiko Miles LCSW, LCADC for regularly scheduled individual session.Verified the patients identity.    Maggie presents for session on time, clean and casually dressed  without evidence of intoxication, withdrawal, or perceptual disturbance.   The patient was open and engaged.    Chief Compliant: Patient presents with anxiety and depression  Interactive Complexity: Interactive Complexity No If yes, due to:       HPI: Data: Patient shares that she is having more arthritic pain over the last few days.  Patient shares that her daughter-in-laws grandmother passed away and other family has CVOID.  Patient shares that she is babysitting her granddaughters a few days a week.  She shares stress from CVOID and worry about her children.  Granddaughter has begun therapy.  Things at home are worse but she thinks she is handling the stress better. \"Im not getting as mad\".  She has acknowledged that  is having blackouts.  Has been quilting which she enjoys.  Has been trying to figure out how she can quilt them on her own.    Depression Low mood for > 2 weeks, Feelings of guilt/worthlessness, Low energy, Impaired concentration and Psychomotor slowing  Generalized Anxiety  Excess Worry, Restless/Edgy, Easily fatigued and Muscle tension. Onset of symptoms was vague.  Symptoms are associated with financial burdens and lack of support.  Symptoms are aggravated by anxiety, lonely and stress.   Symptoms improve with medication management, therapy, support groups and personal self-care (wellness) Current rates severity of symptoms, on a scale of 1-10 (10 is the most severe) 5 Context Family and social history was reviewed  Quality been intermittent without a consistent pattern.    Life Occurrences since last visit:   CLINICAL MANEUVERING/INTERVENTION/SUPPORTIVE PSYCHOTHERAPY: " Therapist continued to promote the therapeutic alliance, address the patient’s issues, and strengthen self awareness, insights, and coping skills.  Exploring what is the better/best way she can handle things that reduce her stress level.  Discussed her own regards as when she is able to complete difficult tasks without becoming angry.  Encouraged games and little tricks to help her feel empowered when dealing with grandchildren.  Reviewing coping strategies for husbands behaviors. Instead of anger.  Therapist applied CBT/REBT, Exploration of Coping Skills and Interactive Feedback and encouraged the patient to use positive coping skills such as Exercising, Drawing/Art, Meditation/Practice yoga, Reframe the way you are thinking about the problem, Self Care (Take care of your body in a way that makes you feel good - paint your nails, do your hair, put on a face mask), Establish healthy boundaries , Use positive self-talk, Keep a positive attitude and Utilize resources/coping skills.  Therapist allowed Maggie  to freely discuss issues without interruption or judgment. Provided safe, confidential environment to facilitate the development of positive therapeutic relationship and encourage open, honest communication. Assisted patient in identifying increased risk factors which would indicate the need for higher level of care including thoughts to harm self or others, self-harming behavior, and/or binge drinking and encouraged patient to contact this office, call 911, or present to the nearest emergency room should any of these events occur. Discussed crisis intervention services and means to access.    Risk Assessment:  [x] No SI/HI, []  passive thoughts []  suicidal ideation , []  homicidal ideation, [] self-harm Explain       Assessment          Psychiatric/Behavioral: Negative for agitation, behavioral problems, decreased concentration, dysphoric mood, hallucinations, self-injury, sleep disturbance, suicidal ideas,  negative for hyperactivity. Positive for depressed mood and stress. The patient is nervous/anxious.              Mental Status Exam:    Hygiene:   good  Cooperation:  Cooperative  Eye Contact:  Good  Psychomotor Behavior:  Appropriate  Affect:  Appropriate  Hopelessness: 1  Speech:  Normal  Goal directed and Linear  Thought Content:  Normal  Suicidal:  None  Homicidal:  None  Hallucinations:  None  Delusion:  None  Memory:  Intact  Orientation:  Person, Place, Time and Situation  Reliability:  good  Insight:  Fair  Judgement:  Fair  Impulse Control:  Good and Fair    Patient's Support Network Includes:  daughter, son and extended family    Progress toward goal: Not at goal    Functional Status: Moderate impairment     Overall: Anxious     VISIT DIAGNOSIS:     ICD-10-CM ICD-9-CM   1. MIRIAM (generalized anxiety disorder)  F41.1 300.02   2. Dysthymic disorder  F34.1 300.4      PROGNOSIS: fair if patient follows treatment recommendations    The patient appears to be mentally/physically stable compared to his baseline functioning.  However, they continues to present with a severe chronic mental illness. As a result,  they would be at significantly increased risk for decompensation and possibly higher level of care without continued treatment.  It is reasonable to assume they would considerably benefit from ongoing treatment.        PROGRESS TOWARD CURRENT PLAN OF CARE/TREATMENT PLAN:  Making Progress    SHORT-TERM GOALS: The patient will  will learn and practice at least 2 anxiety management techniques with goal of decreasing anxiety, will learn and practice at least 2 depression management techniques with goal of decreasing depression, will engage in one self-care activity daily, per self-report and will engage in one enjoyable activity daily, per self-report     LONG-TERM GOALS: With the help of therapy, I would like to:   learn how to enjoy life and have fun  clarify or come to terms with expectations or feelings  related to my partner, spouse, or significant other, learn how to be more assertive with others and set appropriate boundaries and learn how to handle other people's reactions to my behavior (criticism, rejection, praise, etc.).  come to terms with things that happened in the past and understand more clearly who I am, what I' m capable of, and what I want out of life  clarify my needs and desires and learn how to express them more effectively, allow myself to experience feelings and express them more effectively and learn how to deal with strong negative feelings (e.g., anger, rage)  learn how to cope with negative thoughts, ruminations, or sense of guilt, find a way out of negative mood, sadness, or sense of inner emptiness, learn how to reduce or cope with physical pain, learn how be more organized in daily life and learn how to handle stressful situations better    STRENGTHS: Motivated for treatment, Literate, Good family support and Articulate    WEAKNESSES: Poor social support and Poor coping skills    Plan   Crisis Plan:  Symptoms and/or behaviors to indicate a crisis: Thinking about suicide    What calming techniques or other strategies will patient use to de-esclate and stay safe: slow down, breathe, visualize calming self, think it though, listen to music, change focus, take a walk  Who is one person patient can contact to assist with de-escalation? *Daughter    Crisis Management: the Patient will contact staff or crisis line if symptoms exacerbate or if harm to self or others becomes a concern. Crisis resources include: Crisis Line [phone], 911, Local Law Enforcement, [facility], [facility] 24/7 Emergency Room [phone].    PLAN:   Will continue in MONTHLY ongoing outpatient treatment via Face-to-Facewith primary therapist and pharmacotherapy as scheduled.   Will  report any adverse reactions to treatment/medication interventions immediately.  Will be compliant with treatment and appointments.    January 27, 2022 11:08 EST    Recommended Referrals: Psychiatrist/APRN  Patient will adhere to medication regimen as prescribed and report any side effects. Patient will contact this office, call 911 or present to the nearest emergency room should suicidal or homicidal ideations occur. Provide Cognitive Behavioral Therapy and Solution Focused Therapy to improve functioning, maintain stability, and avoid decompensation and the need for higher level of care.          Future Appointments       Provider Department Center    2/28/2022 12:30 PM Haim Hill MD [facility]    3/14/2022 11:00 AM Yumiko Alejandre LCSW [facility]    3/21/2022 1:30 PM Daniel Ziegler MD [facility]          January 27, 2022 11:08 EST      Yumiko Miles LCSW, [facility]

## 2022-02-28 ENCOUNTER — OFFICE VISIT (OUTPATIENT)
Dept: PSYCHIATRY | Facility: CLINIC | Age: 72
End: 2022-02-28

## 2022-02-28 VITALS
SYSTOLIC BLOOD PRESSURE: 148 MMHG | DIASTOLIC BLOOD PRESSURE: 89 MMHG | HEART RATE: 62 BPM | WEIGHT: 171 LBS | HEIGHT: 61 IN | BODY MASS INDEX: 32.28 KG/M2

## 2022-02-28 DIAGNOSIS — F41.1 GAD (GENERALIZED ANXIETY DISORDER): ICD-10-CM

## 2022-02-28 DIAGNOSIS — F34.1 DYSTHYMIC DISORDER: ICD-10-CM

## 2022-02-28 PROCEDURE — 99214 OFFICE O/P EST MOD 30 MIN: CPT | Performed by: PSYCHIATRY & NEUROLOGY

## 2022-02-28 RX ORDER — DULOXETIN HYDROCHLORIDE 60 MG/1
60 CAPSULE, DELAYED RELEASE ORAL DAILY
Qty: 90 CAPSULE | Refills: 1 | Status: SHIPPED | OUTPATIENT
Start: 2022-02-28 | End: 2022-08-18 | Stop reason: SDUPTHER

## 2022-02-28 NOTE — PROGRESS NOTES
"Patient ID: Maggie Emery is a 72 y.o. female    SERVICE TYPE: EVALUATION AND MANAGEMENT (greater than 50% of the time spent for supportive psychotherapy).      /89   Pulse 62   Ht 156.2 cm (61.5\")   Wt 77.6 kg (171 lb)   BMI 31.79 kg/m²     ALLERGIES:  Ambien [zolpidem tartrate], Darvon [propoxyphene], Hydrocodone, and Ibuprofen    CC/ Focus of the visit: Depression/anxiety    HPI: Patient reports that her home situation has not changed dramatically, she is coping and continues her interest with Quilting and is about to resume gardening.  She has continued the supportive psychotherapy effort with Yumiko Palencia [facility].  She is helping out with her children's children which gives her sense of purpose and can help with the home situation.  There is been no recurrence of significant sustained depressive symptomatology.    PFSH: No change in her 's behavior and drinking.     Review of Systems  No cardiopulmonary, GI or neurological complaints.    SUPPORTIVE PSYCHOTHERAPY: continuing efforts to promote the therapeutic alliance, address the patient’s issues, and strengthen self awareness, insights, and positive coping skills such as Exercising, listen to music, spending time in nature and utilizing resources.     Mental Status Exam  Appearance:  appropriate  Attitude toward clinician:  cooperative and agreeable   Speech:    Rate:  regular rate and rhythm   Volume: normal  Motor:  no abnormal movements   Mood:  Good  Affect:  euthymic  Thought Processes:  linear, logical, and goal directed  Thought Content:  Normal   Feeling Hopeless: absent  Suicidal Thoughts or Intent:  absent  Homicidal Thoughts:  absent  Perceptual Disturbance: no perceptual disturbance  Attention and Concentration:  good  Insight and Judgement:  good  Memory:  memory appears to be intact    LABS: No results found for this or any previous visit (from the past 168 hour(s)).    MEDICATION ISSUES: Have discussed with the patient " the medications Risks, Benefits, and Side effects including potential falls, possible impaired driving and  metabolic adversities among others. No medication side effects or related complaints today.     TREATMENT PLAN/GOALS: Continue supportive psychotherapy efforts and medications as indicated.     Current Outpatient Medications   Medication Sig Dispense Refill   • atenolol (TENORMIN) 50 MG tablet Take 50 mg by mouth Daily.     • diltiaZEM CD (CARTIA XT) 300 MG 24 hr capsule Take 300 mg by mouth Daily.     • DULoxetine (CYMBALTA) 60 MG capsule Take 1 capsule by mouth Daily. 90 capsule 1   • eszopiclone (Lunesta) 3 MG tablet Take 1 tablet by mouth At Night As Needed for Sleep. Take immediately before bedtime 90 tablet 0   • fluticasone (Flonase) 50 MCG/ACT nasal spray Flonase Allergy Relief 50 mcg/actuation nasal spray,suspension   2 sprays each nostril     • fluticasone (VERAMYST) 27.5 MCG/SPRAY nasal spray 2 sprays into each nostril Daily.     • lansoprazole (PREVACID) 30 MG capsule Take 30 mg by mouth Daily.     • losartan-hydrochlorothiazide (HYZAAR) 100-25 MG per tablet Take 1 tablet by mouth Daily.     • Potassium Chloride (KCL-20 PO) Take 20 tablets by mouth Daily.     • pravastatin (PRAVACHOL) 80 MG tablet Daily.     • rizatriptan MLT (MAXALT-MLT) 5 MG disintegrating tablet Take 5 mg by mouth 1 (One) Time As Needed for migraine. May repeat in 2 hours if needed     • amoxicillin-clavulanate (AUGMENTIN) 875-125 MG per tablet      • Diclofenac Sodium (Voltaren) 1 % gel gel Voltaren 1 % topical gel   APPLY 2 GRAMS TO THE AFFECTED AREA(S) BY TOPICAL ROUTE 4 TIMES PER DAY     • meclizine (ANTIVERT) 12.5 MG tablet        No current facility-administered medications for this visit.       COLLATERAL PSYCHOTHERAPEUTIC INTERVENTION: Patient is continued with once or twice a month context with therapist Yumiko Palencia LCSW, that has been very helpful and primarily serves as principal therapeutic process due to the  patient's longstanding difficulties with anxiety.    RISK: Mild to moderate    Assessment/Plan     Diagnoses and all orders for this visit:    1. Dysthymic disorder  -     DULoxetine (CYMBALTA) 60 MG capsule; Take 1 capsule by mouth Daily.  Dispense: 90 capsule; Refill: 1  Patient continues on Lunesta 3 mg nightly, as a supply to do till approximately mid April, is to call for refill.    2. MIRIAM (generalized anxiety disorder)  -     DULoxetine (CYMBALTA) 60 MG capsule; Take 1 capsule by mouth Daily.  Dispense: 90 capsule; Refill: 1        Return in about 6 months (around 8/28/2022).           Patient knows to call if symptoms worsen or fail to improve between appointments.    I spent 30 minutes caring for Maggie on this date of service. This time includes time spent by me in the following activities: Patient evaluation, support psychotherapy, decisions, medications, and documentation.     Dictated utilizing [employer] dictation    MAKSIM Hill MD

## 2022-03-14 ENCOUNTER — OFFICE VISIT (OUTPATIENT)
Dept: PSYCHIATRY | Facility: CLINIC | Age: 72
End: 2022-03-14

## 2022-03-14 DIAGNOSIS — F41.1 GAD (GENERALIZED ANXIETY DISORDER): Primary | ICD-10-CM

## 2022-03-14 DIAGNOSIS — F34.1 DYSTHYMIC DISORDER: ICD-10-CM

## 2022-03-14 PROCEDURE — 90834 PSYTX W PT 45 MINUTES: CPT | Performed by: SOCIAL WORKER

## 2022-03-14 NOTE — TREATMENT PLAN
Multi-Disciplinary Problems (from Behavioral Health Treatment Plan)    Active Problems     Problem: Anxiety  Start Date: 05/11/20    Problem Details:  The patient self-scales this problem as a 4 with 10 being the worst.       Goal Priority Start Date Expected End Date End Date    Patient will develop and implement behavioral and cognitive strategies to reduce anxiety and irrational fears. -- 05/11/20  --    Goal Details:  Progress toward goal:  The patient self-scales their progress related to this goal as a 3-6 with 10 being the worst this depends of the life stressors that are happening.  Overall she is making progress and having success at times being able to challenge the negative thoughts that happen       Goal Intervention Frequency Start Date End Date    Help patient explore past emotional issues in relation to present anxiety. Q2 Weeks 05/11/20     Intervention Details:  Duration of treatment until until remission of symptoms.       Goal Intervention Frequency Start Date End Date    Help patient develop an awareness of their cognitive and physical responses to anxiety. Q Month 05/11/20     Intervention Details:  Duration of treatment until until remission of symptoms.             Problem: Co-Dependency  Start Date: 05/11/20    Problem Details:  The patient self-scales this problem as a 8 with 10 being the worst.       Goal Priority Start Date Expected End Date End Date    Patient will demonstrate ability to set healthy boundaries and meet own needs within a relationship. -- 05/11/20  --    Goal Details:  Progress toward goal:  The patient self-scales their progress related to this goal as a 4 with 10 being the worst.    drinking has worsened but patient is reacting less.     Goal Intervention Frequency Start Date End Date    Assist patient in identifying enabling behaviors and healthy boundaries within relationships. Q2 Weeks 05/11/20     Intervention Details:  Duration of  "treatment until until remission of symptoms.             Problem: Depression  Start Date: 05/11/20    Problem Details:  The patient self-scales this problem as a 4 with 10 being the worst.       Goal Priority Start Date Expected End Date End Date    Patient will demonstrate the ability to initiate new constructive life skills outside of sessions on a consistent basis. -- 05/11/20  --    Goal Details:  Progress toward goal:  The patient self-scales their progress related to this goal as a 3-4 with 10 being the worst.  This has gone up and down but is better as she finds things she can do, celebrate recovery or alanon which she enjoys online.         Goal Intervention Frequency Start Date End Date    Assist patient in setting attainable activities of daily living goals. PRN 05/11/20     Goal Intervention Frequency Start Date End Date    Provide education about depression PRN 05/11/20     Intervention Details:  Duration of treatment until until remission of symptoms.       Goal Intervention Frequency Start Date End Date    Assist patient in developing healthy coping strategies. PRN 05/11/20     Intervention Details:  Duration of treatment until until remission of symptoms.                     Has been continued to assist in babysitting granddaughters which she enjoys. Husbands alcohol use and memory issues continues and she is managing this as best she can.  Continues to attend 12 step support for this which is helpful.  Has experienced more days of \"acceptance\" regarding things out of her control.  Trying to learn ways to help parent grandchildren differently to improve relationship.  Continues To sew and quilt.  Finds the therapeutic process helpful for assisting her to maintain positive progress and prevent deterioration of her mood.   I have discussed and reviewed this treatment plan with the patient.             "
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
## 2022-03-21 ENCOUNTER — OFFICE VISIT (OUTPATIENT)
Dept: OBSTETRICS AND GYNECOLOGY | Facility: CLINIC | Age: 72
End: 2022-03-21

## 2022-03-21 VITALS
DIASTOLIC BLOOD PRESSURE: 70 MMHG | BODY MASS INDEX: 31.91 KG/M2 | SYSTOLIC BLOOD PRESSURE: 110 MMHG | HEIGHT: 61 IN | WEIGHT: 169 LBS

## 2022-03-21 DIAGNOSIS — E66.9 OBESITY (BMI 30.0-34.9): ICD-10-CM

## 2022-03-21 DIAGNOSIS — M85.88 OSTEOPENIA OF LUMBAR SPINE: ICD-10-CM

## 2022-03-21 DIAGNOSIS — Z12.31 VISIT FOR SCREENING MAMMOGRAM: ICD-10-CM

## 2022-03-21 DIAGNOSIS — Z01.419 WOMEN'S ANNUAL ROUTINE GYNECOLOGICAL EXAMINATION: Primary | ICD-10-CM

## 2022-03-21 PROCEDURE — G0101 CA SCREEN;PELVIC/BREAST EXAM: HCPCS | Performed by: OBSTETRICS & GYNECOLOGY

## 2022-03-21 RX ORDER — VITC/E/ZINC/COPPER/LUTEIN/ZEAX 250 MG-200
TABLET,CHEWABLE ORAL
COMMUNITY
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
## 2022-03-28 ENCOUNTER — OFFICE VISIT (OUTPATIENT)
Dept: PSYCHIATRY | Facility: CLINIC | Age: 72
End: 2022-03-28

## 2022-03-28 DIAGNOSIS — F34.1 DYSTHYMIC DISORDER: ICD-10-CM

## 2022-03-28 DIAGNOSIS — F41.1 GAD (GENERALIZED ANXIETY DISORDER): Primary | ICD-10-CM

## 2022-03-28 PROCEDURE — 90837 PSYTX W PT 60 MINUTES: CPT | Performed by: SOCIAL WORKER

## 2022-03-28 NOTE — PROGRESS NOTES
Date of Service: March 28, 2022  Time In: 10:00  Time Out: 10:58 am      PROGRESS NOTE  Data:The patient is a 72 y.o. person who met 1:1 with Yumiko Miles LCSW,[facility] for regularly scheduled individual session.Verified the patients identity.Migue Serrano presents for session on time, clean and casually dressed  without evidence of intoxication, withdrawal, or perceptual disturbance.   The patient was open and engaged.      Chief Compliant: Patient presents with anxiety and depression    Interactive Complexity: Interactive Complexity No      HPI: Data:  Patient shares that she has had low energy, not doing the things she enjoys but has plans to get her sewing back out this week.  She shares stress around her  and his tendency to only take care of himself and be thoughtless. She shares that she is just not happy at home and vents about this.  She shares a little stress over the multiple birthdays and her own anniversary tomorrow.  Discussed gentle parenting and how patient is managing as she tries new techniques.    Depression Low mood for > 2 weeks, Decreased/Increased sleep, Loss of Interest, Feelings of guilt/worthlessness, Low energy and Psychomotor slowing  Generalized Anxiety  Excess Worry, Restless/Edgy, Easily fatigued and Decreased sleep. Onset of symptoms was vague.  Symptoms are associated with relationship problem with husbands ongoing drinking, financial burdens and lack of support.  Symptoms are aggravated by anxiety, lonely, sadness and stress.   Symptoms improve with medication management, therapy, personal self-care (wellness) and 12-step programs Current rates severity of symptoms, on a scale of 1-10 (10 is the most severe) 7 Context Family and social history was reviewed Quality been intermittent without a consistent pattern.        CLINICAL MANEUVERING/INTERVENTION/SUPPORTIVE PSYCHOTHERAPY: Therapist continued to promote the therapeutic alliance, address the patient’s issues, and  strengthen self awareness, insights, and coping skills. Normalized intense feelings while assisting the patient to explore things she can do to assist herself in feeling better.  Discussed relationship patterns./communication skills.  Abraham concern about the need to increase attendance at 12 step support programs for additional coping support. Therapist applied CBT/REBT and Exploration of Relationship Patterns and encouraged the patient to use positive coping skills such as Drawing/Art, Playing with a pet, Managing hostile feelings, Reframe the way you are thinking about the problem, Self Care (Take care of your body in a way that makes you feel good - paint your nails, do your hair, put on a face mask), Establish healthy boundaries , Use positive self-talk, Keep a positive attitude, Keep calm by thinking and Utilize resources/coping skills.  Therapist allowed Maggie  to freely discuss issues without interruption or judgment. Provided safe, confidential environment to facilitate the development of positive therapeutic relationship and encourage open, honest communication. Assisted patient in identifying increased risk factors which would indicate the need for higher level of care including thoughts to harm self or others, self-harming behavior, and/or binge drinking and encouraged patient to contact this office, call 911, or present to the nearest emergency room should any of these events occur. Discussed crisis intervention services and means to access.     Risk Assessment:  [x] No SI/HI, []  passive thoughts []  suicidal ideation , []  homicidal ideation, [] self-harm Explain     Assessment        Psychiatric/Behavioral: Negative for agitation, behavioral problems, decreased concentration, dysphoric mood, hallucinations, self-injury, sleep disturbance, suicidal ideas, negative for hyperactivity. Positive for depressed mood and stress. The patient is nervous/anxious.              Mental Status Exam:    Hygiene:    good  Cooperation:  Cooperative  Eye Contact:  Good  Psychomotor Behavior:  Appropriate  Affect:  Blunted  Hopelessness: 3  Speech:  Normal  Goal directed and Linear  Thought Content:  Normal  Suicidal:  None  Homicidal:  None  Hallucinations:  None  Delusion:  None  Memory:  grossly intact  Orientation:  Person, Place, Time and Situation  Reliability:  good  Insight:  Good and Fair  Judgement:  Good  Impulse Control:  Good    Patient's Support Network Includes:  children and extended family    Progress toward goal: Not at goal    Functional Status: Moderate impairment     Overall: Anxious     VISIT DIAGNOSIS:     ICD-10-CM ICD-9-CM   1. MIRIAM (generalized anxiety disorder)  F41.1 300.02   2. Dysthymic disorder  F34.1 300.4        PROGNOSIS: fair if patient follows treatment recommendations    The patient appears to be mentally/physically stable compared to his baseline functioning.  However, they continues to present with a severe chronic mental illness. As a result,  they would be at significantly increased risk for decompensation and possibly higher level of care without continued treatment.  It is reasonable to assume they would considerably benefit from ongoing treatment.          PROGRESS TOWARD CURRENT PLAN OF CARE/TREATMENT PLAN:  Making Progress    SHORT-TERM GOALS: The patient will  will learn and practice at least 2 anxiety management techniques with goal of decreasing anxiety, will work with therapist to help expose and extinguish irrational beliefs and conclusions that contribute to anxiety/depression, will work with therapist to identify conflicts from the past and the present that form the basis, will engage in one self-care activity daily, per self-report and will engage in one enjoyable activity daily, per self-report     LONG-TERM GOALS: With the help of therapy, I would like to:   learn how to enjoy life and have fun  change my current family situation in some way, learn how to handle being alone,  learn how to be more assertive with others and set appropriate boundaries and learn how to handle other people's reactions to my behavior (criticism, rejection, praise, etc.).  understand more clearly who I am, what I' m capable of, and what I want out of life  clarify my needs and desires and learn how to express them more effectively, learn how to make decisions more independently, learn how to finish projects I've started, allow myself to experience feelings and express them more effectively and learn how to deal with strong negative feelings (e.g., anger, rage)  learn how to cope with negative thoughts, ruminations, or sense of guilt, find a way out of negative mood, sadness, or sense of inner emptiness, learn how be more organized in daily life and learn how to handle stressful situations better    STRENGTHS: Motivated for treatment, Literate and Articulate    WEAKNESSES: Poor social support and Poor coping skills    Plan   Crisis Plan:  Symptoms and/or behaviors to indicate a crisis: Thinking about suicide    What calming techniques or other strategies will patient use to de-esclate and stay safe: slow down, breathe, visualize calming self, think it though, listen to music, change focus, take a walk  Who is one person patient can contact to assist with de-escalation? Daughter    Crisis Management: the Patient will contact staff or crisis line if symptoms exacerbate or if harm to self or others becomes a concern. Crisis resources include: Crisis Line [phone], 916, Local Law Enforcement, [facility], [facility] 24/7 Emergency Room [phone].    PLAN:   Will continue in BI-WEEKLY, MONTHLY or AS NEEDED ongoing outpatient treatment via Face-to-Facewith primary therapist and pharmacotherapy as scheduled.   Will  report any adverse reactions to treatment/medication interventions immediately.  Will be compliant with treatment and appointments.   March 28, 2022 10:02 EDT    Recommended Referrals:  Psychiatrist/APRN  Patient will adhere to medication regimen as prescribed and report any side effects. Patient will contact this office, call 911 or present to the nearest emergency room should suicidal or homicidal ideations occur. Provide Cognitive Behavioral Therapy and Solution Focused Therapy to improve functioning, maintain stability, and avoid decompensation and the need for higher level of care.          Future Appointments       Provider Department Center    4/25/2022 10:00 AM Yumiko Alejandre LCSW [facility]    5/9/2022 1:00 PM Yumiko Alejandre LCSW [facility]    8/15/2022 1:00 PM Haim Hill MD [facility]    8/26/2022 1:20 PM TAY BR SCREENING [facility] [address]    3/27/2023 1:30 PM Daniel Ziegler MD [facility] OB GYN TAY          March 28, 2022 10:02 EDT      Yumiko Miles LCSW, [facility]         D

## 2022-04-18 DIAGNOSIS — F34.1 DYSTHYMIC DISORDER: ICD-10-CM

## 2022-04-18 RX ORDER — ESZOPICLONE 3 MG/1
3 TABLET, FILM COATED ORAL NIGHTLY PRN
Qty: 90 TABLET | Refills: 0 | Status: SHIPPED | OUTPATIENT
Start: 2022-04-18 | End: 2022-07-13 | Stop reason: SDUPTHER

## 2022-05-09 ENCOUNTER — OFFICE VISIT (OUTPATIENT)
Dept: PSYCHIATRY | Facility: CLINIC | Age: 72
End: 2022-05-09

## 2022-05-09 DIAGNOSIS — F34.1 DYSTHYMIC DISORDER: ICD-10-CM

## 2022-05-09 DIAGNOSIS — F41.1 GAD (GENERALIZED ANXIETY DISORDER): Primary | ICD-10-CM

## 2022-05-09 PROCEDURE — 90837 PSYTX W PT 60 MINUTES: CPT | Performed by: SOCIAL WORKER

## 2022-05-09 NOTE — PROGRESS NOTES
"Date of Service: May 9, 2022  Time In: 1:00 pm  Time Out: 1:55 pm    PROGRESS NOTE  Data:The patient is a 72 y.o. person who met 1:1 with Yumiko Miles LCSW,[facility] for regularly scheduled individual session.Verified the patients identity.  .  Maggie presents for session on time, clean and casually dressed  without evidence of intoxication, withdrawal, or perceptual disturbance.   The patient was open and engaged.      Chief Compliant: Patient presents with anxiety & depression  Interactive Complexity: Interactive Complexity No      HPI: Data:  Patient shares that she has had a headache for 3 weeks and anxiety has been worse.  Son-in-law  finally sought medical care, and the new changes he is trying and has lost weight.  Daughter has gained weight.  She shares that she is worried about her daughter who \"blew up at me\".  Patient shares that this blow up happened just as they were al getting in the car to drive to [place] for a wedding.  Patient chose not to attend and discusses how stressful that was for her. Has not been quilting or sewing. Patient shares marital discord and unkind things that have been said and done.  Has been having some more pain from joint in her lower back.   Depression Low mood for > 2 weeks, Feelings of guilt/worthlessness, Low energy and Impaired concentration  Generalized Anxiety  Excess Worry, Restless/Edgy, Easily fatigued, Muscle tension and Decreased concentration. Onset of symptoms was vague.  Symptoms are associated with relationship problem with unchanged since last visit, chronic pain/pain management and lack of support.  Symptoms are aggravated by anxiety, sadness and stress.   Symptoms improve with medication management, therapy, personal self-care (wellness) and 12-step programs Current rates severity of symptoms, on a scale of 1-10 (10 is the most severe) 6 Context Family and social history was reviewed  Quality been intermittent without a consistent " "pattern.    CLINICAL MANEUVERING/INTERVENTION/SUPPORTIVE PSYCHOTHERAPY: Therapist continued to promote the therapeutic alliance, address the patient’s issues, and strengthen self awareness, insights, and coping skills.  Discussed communication in relationships and boundaries.  Praised patient for recognizing when she needs to step away and her ability to reframe husbands negative people as \"he is sick\".  Assisted the patient to make a plan & encouraged her to use her good problem solving skills. Therapist applied CBT/REBT, Communication Skills and Exploration of Relationship Patterns and encouraged the patient to use positive coping skills such as Exercising, Listen to music, Playing with a pet, Spending time in nature, Reframe the way you are thinking about the problem, Self Care (Take care of your body in a way that makes you feel good - paint your nails, do your hair, put on a face mask), Establish healthy boundaries , Use positive self-talk, Keep a positive attitude, Keep calm by thinking and Utilize resources/coping skills.  Therapist allowed Maggie  to freely discuss issues without interruption or judgment. Provided safe, confidential environment to facilitate the development of positive therapeutic relationship and encourage open, honest communication. Assisted patient in identifying increased risk factors which would indicate the need for higher level of care including thoughts to harm self or others, self-harming behavior, and/or binge drinking and encouraged patient to contact this office, call 911, or present to the nearest emergency room should any of these events occur. Discussed crisis intervention services and means to access.    Risk Assessment:  [x] No SI/HI, []  passive thoughts []  suicidal ideation , []  homicidal ideation, [] self-harm Explain Risk of self-harm low, but could be further elevated in the event of treatment noncompliance and/or AODA.  Assessment     Psychiatric/Behavioral: Negative " for agitation, behavioral problems, dysphoric mood, hallucinations, self-injury, sleep disturbance, suicidal ideas, negative for hyperactivity. Positive for depressed mood decreased concentration and stress. The patient is nervous/anxious.              Mental Status Exam:    Hygiene:   good  Cooperation:  Cooperative  Eye Contact:  Good  Psychomotor Behavior:  Appropriate  Affect:  Appropriate  Hopelessness: 1  Speech:  Normal  Linear  Thought Content:  Normal  Suicidal:  None  Homicidal:  None  Hallucinations:  None  Delusion:  None  Memory:  Intact  Orientation:  Person, Place, Time and Situation  Reliability:  good  Insight:  Good and Fair  Judgement:  Good  Impulse Control:  Good and Fair    Patient's Support Network Includes:  children and extended family    Progress toward goal: Not at goal    Functional Status: Moderate impairment     Overall: Anxious     VISIT DIAGNOSIS:     ICD-10-CM ICD-9-CM   1. MIRIAM (generalized anxiety disorder)  F41.1 300.02   2. Dysthymic disorder  F34.1 300.4        PROGNOSIS: fair if patient follows treatment recommendations    The patient appears to be mentally/physically stable compared to his baseline functioning.  However, they continues to present with a severe chronic mental illness. As a result,  they would be at significantly increased risk for decompensation and possibly higher level of care without continued treatment.  It is reasonable to assume they would considerably benefit from ongoing treatment.          PROGRESS TOWARD CURRENT PLAN OF CARE/TREATMENT PLAN:  Making Progress    SHORT-TERM GOALS: The patient will  will express feelings to therapist each contact , will learn and practice at least 2 anxiety management techniques with goal of decreasing anxiety, will learn and practice at least 2 depression management techniques with goal of decreasing depression, will work with therapist to help expose and extinguish irrational beliefs and conclusions that contribute to  anxiety/depression, will work with therapist to identify conflicts from the past and the present that form the basis, will engage in one self-care activity daily, per self-report and will engage in one enjoyable activity daily, per self-report     LONG-TERM GOALS: With the help of therapy, I would like to:   learn how to structure my spare-time more meaningfully (hobbies, etc), learn how to relax and take it easy and learn how to enjoy life and have fun  clarify or come to terms with expectations or feelings related to my partner, spouse, or significant other, learn how to be more assertive with others and set appropriate boundaries and learn how to handle other people's reactions to my behavior (criticism, rejection, praise, etc.).  come to terms with things that happened in the past and understand more clearly who I am, what I' m capable of, and what I want out of life  clarify my needs and desires and learn how to express them more effectively, figure out what my limits are and how to act accordingly, allow myself to experience feelings and express them more effectively and learn how to deal with strong negative feelings (e.g., anger, rage)  learn how to cope with negative thoughts, ruminations, or sense of guilt, find a way out of negative mood, sadness, or sense of inner emptiness, learn how to reduce or cope with physical pain, learn how be more organized in daily life and learn how to handle stressful situations better    STRENGTHS: Motivated for treatment, Literate and Articulate    WEAKNESSES: Poor social support and Poor coping skills    Plan   Crisis Plan:  Symptoms and/or behaviors to indicate a crisis: Thinking about suicide    What calming techniques or other strategies will patient use to de-esclate and stay safe: slow down, breathe, visualize calming self, think it though, listen to music, change focus, take a walk  Who is one person patient can contact to assist with de-escalation?  Daughter    Crisis Management: the Patient will contact staff or crisis line if symptoms exacerbate or if harm to self or others becomes a concern. Crisis resources include: Crisis Line [phone], 911, Local Law Enforcement, [facility], [facility] 24/7 Emergency Room [phone].    PLAN:   Will continue in MONTHLY or AS NEEDED ongoing outpatient treatment via Face-to-Facewith primary therapist and pharmacotherapy as scheduled.   Will  report any adverse reactions to treatment/medication interventions immediately.  Will be compliant with treatment and appointments.   May 9, 2022 13:04 EDT    Recommended Referrals: Psychiatrist/APRN  Patient will adhere to medication regimen as prescribed and report any side effects. Patient will contact this office, call 911 or present to the nearest emergency room should suicidal or homicidal ideations occur. Provide Cognitive Behavioral Therapy and Solution Focused Therapy to improve functioning, maintain stability, and avoid decompensation and the need for higher level of care.          Future Appointments       Provider Department Center    5/23/2022 12:00 PM Yumiko Alejandre LCSW [facility]    6/6/2022 12:00 PM Yumiko Alejandre LCSW [facility]    8/18/2022 2:00 PM Haim Hill MD [facility]    8/26/2022 1:20 PM TAY BR SCREENING [facility] [address]    3/27/2023 1:30 PM Daniel Ziegler MD [facility] OB GYN TAY          May 9, 2022 13:04 EDT      Yumiko Miles LCSW, [facility]

## 2022-05-23 ENCOUNTER — OFFICE VISIT (OUTPATIENT)
Dept: PSYCHIATRY | Facility: CLINIC | Age: 72
End: 2022-05-23

## 2022-05-23 DIAGNOSIS — F34.1 DYSTHYMIC DISORDER: ICD-10-CM

## 2022-05-23 DIAGNOSIS — F41.1 GAD (GENERALIZED ANXIETY DISORDER): Primary | ICD-10-CM

## 2022-05-23 PROCEDURE — 90837 PSYTX W PT 60 MINUTES: CPT | Performed by: SOCIAL WORKER

## 2022-05-23 NOTE — PROGRESS NOTES
Date of Service: May 23, 2022  Time In: 12:00 pm  Time Out: 12:55 pm    PROGRESS NOTE  Data:The patient is a 72 y.o. person who [facility] scheduled individual session.Verified the patients identity.    Maggie presents for session on time, clean and casually dressed  without evidence of intoxication, withdrawal, or perceptual disturbance.   The patient was open and engaged.      Chief Compliant: Patient presents with anxiety & depression  Interactive Complexity: Interactive Complexity No If yes, due to:     HPI: Data:  Patient shares that  continues to drink and want to stay at home.  She shares that she had a bad situation with youngest nephew (son of  sister).  Nephew wasn't working and basically spent 40,000 and is essentially homeless living in his car.  Patient was able to give nephew $20 for gas, which she wont do again. He did pay her back.  Shares migraines and past history.   has good and bad days. He continues to be difficult and refuse to give patient gas money to go and visit grandchildren.  Frustrated with husbands behaviors.   Depression Loss of Interest, Feelings of guilt/worthlessness, Low energy and Impaired concentration  Generalized Anxiety  Excess Worry, Restless/Edgy, Easily fatigued, Muscle tension and Decreased concentration. Onset of symptoms was vague.  Symptoms are associated with relationship problem with  who suffers from addiction, financial burdens and lack of support.  Symptoms are aggravated by anxiety and stress.   Symptoms improve with medication management, therapy, personal self-care (wellness) and 12-step programs Current rates severity of symptoms, on a scale of 1-10 (10 is the most severe) 5 Context Family and social history was reviewed  Quality been intermittent without a consistent pattern.    CLINICAL MANEUVERING/INTERVENTION/SUPPORTIVE PSYCHOTHERAPY: Therapist continued to promote the  therapeutic alliance, address the patient’s issues, and strengthen self awareness, insights, and coping skills.  Discussed ways to strengthening communication and using with her - identifying what you want to talk about, what is it you wants listened to validated, etc and how to ask for this. Reviewed mindfulness encouraging to practice and remind herself to trust herself and not to defer to the experts if something is not true for her.  Reframing patients harsh self talk. Therapist applied CBT/REBT, Interactive Feedback and Positive Coping Skills and encouraged the patient to use positive coping skills such as Reframe the way you are thinking about the problem, Self Care (Take care of your body in a way that makes you feel good - paint your nails, do your hair, put on a face mask), Time management (Work on managing your time better - for example, turn off the alerts on your phone), Establish healthy boundaries , Use positive self-talk, Keep a positive attitude,  Laugh (Do something fun), Keep calm by thinking and Utilize resources/coping skills.  Therapist allowed Maggie  to freely discuss issues without interruption or judgment. Provided safe, confidential environment to facilitate the development of positive therapeutic relationship and encourage open, honest communication. Assisted patient in identifying increased risk factors which would indicate the need for higher level of care including thoughts to harm self or others, self-harming behavior, and/or binge drinking and encouraged patient to contact this office, call 911, or present to the nearest emergency room should any of these events occur. Discussed crisis intervention services and means to access.     Risk Assessment:  [x] No SI/HI, []  passive thoughts []  suicidal ideation , []  homicidal ideation, [] self-harm Explain Risk of self-harm acutely is low, but could be further elevated in the event of treatment noncompliance and/or AODA.  Assessment       Psychiatric/Behavioral: Negative for agitation, behavioral problems, decreased concentration, hallucinations, self-injury, sleep disturbance, suicidal ideas, negative for hyperactivity. Positive for dysphoric mood, poor concentration and stress. The patient is nervous/anxious.          Mental Status Exam:    Hygiene:   good  Cooperation:  Cooperative  Eye Contact:  Good  Psychomotor Behavior:  Appropriate  Affect:  Appropriate  Hopelessness: 2  Speech:  Normal  Goal directed and Linear  Thought Content:  Normal  Suicidal:  None  Homicidal:  None  Hallucinations:  None  Delusion:  None  Memory:  Intact  Orientation:  Person, Place, Time and Situation  Reliability:  good  Insight:  Good and Fair  Judgement:  Good  Impulse Control:  Good and Fair    Patient's Support Network Includes:  children and extended family    Progress toward goal: Not at goal    Functional Status: Moderate impairment     Overall: Anxious     VISIT DIAGNOSIS:     ICD-10-CM ICD-9-CM   1. MIRIAM (generalized anxiety disorder)  F41.1 300.02   2. Dysthymic disorder  F34.1 300.4        PROGNOSIS: fair if patient follows treatment recommendations    The patient appears to be mentally/physically stable compared to his baseline functioning.  However, they continues to present with a severe chronic mental illness. As a result,  they would be at significantly increased risk for decompensation and possibly higher level of care without continued treatment.  It is reasonable to assume they would considerably benefit from ongoing treatment.          PROGRESS TOWARD CURRENT PLAN OF CARE/TREATMENT PLAN:  Making Progress    SHORT-TERM GOALS: The patient will  will learn and practice at least 2 anxiety management techniques with goal of decreasing anxiety, will learn and practice at least 2 depression management techniques with goal of decreasing depression, will engage in one self-care activity daily, per self-report and will engage in one enjoyable activity  daily, per self-report     LONG-TERM GOALS: With the help of therapy, I would like to:   learn how to relax and take it easy and learn how to enjoy life and have fun  clarify or come to terms with expectations or feelings related to my partner, spouse, or significant other, learn how to be more assertive with others and set appropriate boundaries and learn how to handle other people's reactions to my behavior (criticism, rejection, praise, etc.).  understand more clearly who I am, what I' m capable of, and what I want out of life  clarify my needs and desires and learn how to express them more effectively, allow myself to experience feelings and express them more effectively and learn how to deal with strong negative feelings (e.g., anger, rage)  learn how to cope with negative thoughts, ruminations, or sense of guilt, find a way out of negative mood, sadness, or sense of inner emptiness, learn how be more organized in daily life and learn how to handle stressful situations better    STRENGTHS: Motivated for treatment, Literate and Articulate    WEAKNESSES: Poor social support and Poor coping skills    Plan   Crisis Plan:  Symptoms and/or behaviors to indicate a crisis: Thinking about suicide    What calming techniques or other strategies will patient use to de-esclate and stay safe: slow down, breathe, visualize calming self, think it though, listen to music, change focus, take a walk  Who is one person patient can contact to assist with de-escalation? Daughters    Crisis Management: the Patient will contact staff or crisis line if symptoms exacerbate or if harm to self or others becomes a concern. Crisis resources include: Crisis Line [phone], 911, Local Law Enforcement, [facility], [facility] 24/7 Emergency Room [phone].    PLAN:   Will continue in MONTHLY or AS NEEDED ongoing outpatient treatment via Face-to-Facewith primary therapist and pharmacotherapy as scheduled.   Will  report any adverse  reactions to treatment/medication interventions immediately.  Will be compliant with treatment and appointments.   May 23, 2022 13:05 EDT    Recommended Referrals: Psychiatrist/APRN and Medical Provider (PCP)  Patient will adhere to medication regimen as prescribed and report any side effects. Patient will contact this office, call 911 or present to the nearest emergency room should suicidal or homicidal ideations occur. Provide Cognitive Behavioral Therapy and Solution Focused Therapy to improve functioning, maintain stability, and avoid decompensation and the need for higher level of care.          Future Appointments       Provider Department Center    6/6/2022 12:00 PM Yumiko Alejandre LCSW [facility]    6/20/2022 1:00 PM Yumiko Alejandre LCSW [facility]    7/5/2022 1:00 PM Yumiko Alejandre LCSW [facility]    7/26/2022 12:00 PM Yumiko Alejandre LCSW [facility]    8/18/2022 2:00 PM Haim Hill MD [facility]    8/26/2022 1:20 PM TAY BR SCREENING [facility] [address]    3/27/2023 1:30 PM Daniel Ziegler MD [facility]          May 23, 2022 13:05 EDT      Yumiko Miles LCSW, [facility]

## 2022-06-06 ENCOUNTER — OFFICE VISIT (OUTPATIENT)
Dept: PSYCHIATRY | Facility: CLINIC | Age: 72
End: 2022-06-06

## 2022-06-06 DIAGNOSIS — F34.1 DYSTHYMIC DISORDER: ICD-10-CM

## 2022-06-06 DIAGNOSIS — F41.1 GAD (GENERALIZED ANXIETY DISORDER): Primary | ICD-10-CM

## 2022-06-06 PROCEDURE — 90837 PSYTX W PT 60 MINUTES: CPT | Performed by: SOCIAL WORKER

## 2022-06-06 NOTE — TREATMENT PLAN
Multi-Disciplinary Problems (from Behavioral Health Treatment Plan)    Active Problems     Problem: Anxiety  Start Date: 05/11/20    Problem Details:  The patient self-scales this problem as a 4 with 10 being the worst.       Goal Priority Start Date Expected End Date End Date    Patient will develop and implement behavioral and cognitive strategies to reduce anxiety and irrational fears. -- 05/11/20  --    Goal Details:  Progress toward goal:  The patient self-scales their progress related to this goal as a 3-6 with 10 being the worst this depends of the life stressors that are happening.  Overall she is making progress and having success at times being able to challenge the negative thoughts that happen       Goal Intervention Frequency Start Date End Date    Help patient explore past emotional issues in relation to present anxiety. Q2 Weeks 05/11/20     Intervention Details:  Duration of treatment until until remission of symptoms.       Goal Intervention Frequency Start Date End Date    Help patient develop an awareness of their cognitive and physical responses to anxiety. Q Month 05/11/20     Intervention Details:  Duration of treatment until until remission of symptoms.             Problem: Co-Dependency  Start Date: 05/11/20    Problem Details:  The patient self-scales this problem as a 8 with 10 being the worst.       Goal Priority Start Date Expected End Date End Date    Patient will demonstrate ability to set healthy boundaries and meet own needs within a relationship. -- 05/11/20  --    Goal Details:  Progress toward goal:  The patient self-scales their progress related to this goal as a 4 to 7 with 10 being the worst, depending of the amount of stress.   drinking has worsened but patient is reacting less.     Goal Intervention Frequency Start Date End Date    Assist patient in identifying enabling behaviors and healthy boundaries within relationships. Q2 Weeks  05/11/20     Intervention Details:  Duration of treatment until until remission of symptoms.             Problem: Depression  Start Date: 05/11/20    Problem Details:  The patient self-scales this problem as a 4 with 10 being the worst.       Goal Priority Start Date Expected End Date End Date    Patient will demonstrate the ability to initiate new constructive life skills outside of sessions on a consistent basis. -- 05/11/20  --    Goal Details:  Progress toward goal:  The patient self-scales their progress related to this goal as a 3-4 with 10 being the worst.  This has gone up and down but is better as she finds things she can do, celebrate recovery or alanon which she enjoys online.         Goal Intervention Frequency Start Date End Date    Assist patient in setting attainable activities of daily living goals. PRN 05/11/20     Goal Intervention Frequency Start Date End Date    Provide education about depression PRN 05/11/20     Intervention Details:  Duration of treatment until until remission of symptoms.       Goal Intervention Frequency Start Date End Date    Assist patient in developing healthy coping strategies. PRN 05/11/20     Intervention Details:  Duration of treatment until until remission of symptoms.                     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  Husbands behaviors are about the same with patient choosing when she talks with him.  She has been able to  not personalize his harsh statements that  makes.  Continues to sew and quilt.  Finds the therapeutic process helpful for assisting her to maintain positive progress and prevent deterioration of her mood.   I have discussed and reviewed this treatment plan with the patient.

## 2022-06-06 NOTE — PROGRESS NOTES
Date of Service: June 6, 2022  Time In: 11:52 am  Time Out: 12:50 pm    PROGRESS NOTE  Data:The patient is a 72 y.o. person who met 1:1 with Yumiko Miles LCSW, [facility] for regularly scheduled individual session.Verified the patients identity.    Maggie presents for session on time, clean and casually dressed  without evidence of intoxication, withdrawal, or perceptual disturbance.   The patient was open and engaged.      Chief Compliant: Patient presents with  Anxiety & depression  Interactive Complexity: Interactive Complexity No If yes, due to:       HPI: Data:  Patient shares that she broke 2 teeth and will eventually have implants.Patient shares her frustrations with medical personal for dismissing her concerns.  Shares frustrations about life, financial stress.  Has not attended 12 steps meetings.  Has been gardening which she finds very relaxing-but has difficulty with motivation first things in the morning   Depression Feelings of guilt/worthlessness, Impaired concentration and Psychomotor slowing  Generalized Anxiety  Excess Worry, Restless/Edgy, Easily fatigued, Muscle tension and Decreased concentration. Onset of symptoms was vague.  Symptoms are associated with relationship problem with unchanged since last visit, financial burdens and lack of support.  Symptoms are aggravated by anxiety and stress.   Symptoms improve with medication management, therapy and 12-step programs Current rates severity of symptoms, on a scale of 1-10 (10 is the most severe) 5 Context Family and social history was reviewed  Quality been intermittent without a consistent pattern.    CLINICAL MANEUVERING/INTERVENTION/SUPPORTIVE PSYCHOTHERAPY: Therapist continued to promote the therapeutic alliance, address the patient’s issues, and strengthen self awareness, insights, and coping skills.  Discussed ways to improve her communication with her medical providers.  Roleplayed how she might be able to use the techniques with  her medical provider tomorrow.  She thinks that keeping in mind how would she act if this was her child instead of herself-which she identified as a place of strength. Praised the patient for using mindfulness techniques in her relationships.  Therapist applied CBT/REBT and Exploration of Coping Skills and encouraged the patient to use positive coping skills such as Reframe the way you are thinking about the problem, Self Care (Take care of your body in a way that makes you feel good - paint your nails, do your hair, put on a face mask), Establish healthy boundaries , Use positive self-talk, Keep a positive attitude, Keep calm by thinking and Utilize resources/coping skills.  Therapist allowed Maggie  to freely discuss issues without interruption or judgment. Encouraged 12 step support if she is able to find a local group.  Provided safe, confidential environment to facilitate the development of positive therapeutic relationship and encourage open, honest communication. Assisted patient in identifying increased risk factors which would indicate the need for higher level of care including thoughts to harm self or others, self-harming behavior, and/or binge drinking and encouraged patient to contact this office, call 911, or present to the nearest emergency room should any of these events occur. Discussed crisis intervention services and means to access.  Risk Assessment:  [x] No SI/HI, []  passive thoughts []  suicidal ideation , []  homicidal ideation, [] self-harm Explain risk of self-harm is low, but could be further elevated in the event of treatment noncompliance and/or AODA.  Assessment      Psychiatric/Behavioral: Negative for agitation, behavioral problems,  hallucinations, self-injury, sleep disturbance, suicidal ideas, negative for hyperactivity. Positive for decreased concentration, and stress. The patient is nervous/anxious.              Mental Status Exam:    Hygiene:   good  Cooperation:   Cooperative  Eye Contact:  Good  Psychomotor Behavior:  Appropriate  Affect:  Appropriate  Hopelessness: 2  Speech:  Normal  Goal directed and Linear  Thought Content:  Normal  Suicidal:  None  Homicidal:  None  Hallucinations:  None  Delusion:  None  Memory:  Intact  Orientation:  Person, Place, Time and Situation  Reliability:  good  Insight:  Good and Fair  Judgement:  Good  Impulse Control:  Good and Fair    Patient's Support Network Includes:  children and extended family    Progress toward goal: Not at goal    Functional Status: Moderate impairment     Overall: Anxious     VISIT DIAGNOSIS:     ICD-10-CM ICD-9-CM   1. MIRIAM (generalized anxiety disorder)  F41.1 300.02   2. Dysthymic disorder  F34.1 300.4        PROGNOSIS: fair if patient follows treatment recommendations    The patient appears to be mentally/physically stable compared to his baseline functioning.  However, they continues to present with a severe chronic mental illness. As a result,  they would be at significantly increased risk for decompensation and possibly higher level of care without continued treatment.  It is reasonable to assume they would considerably benefit from ongoing treatment.          PROGRESS TOWARD CURRENT PLAN OF CARE/TREATMENT PLAN:  Making Progress    SHORT-TERM GOALS: The patient will  will work with therapist to help expose and extinguish irrational beliefs and conclusions that contribute to anxiety/depression, will work with therapist to identify conflicts from the past and the present that form the basis, will engage in one self-care activity daily, per self-report and will engage in one enjoyable activity daily, per self-report     LONG-TERM GOALS: With the help of therapy, I would like to:   learn how to structure my spare-time more meaningfully (hobbies, etc) and learn how to relax and take it easy  clarify or come to terms with expectations or feelings related to my partner, spouse, or significant other, learn how to  be more assertive with others and set appropriate boundaries and learn how to handle other people's reactions to my behavior (criticism, rejection, praise, etc.).  understand more clearly who I am, what I' m capable of, and what I want out of life  clarify my needs and desires and learn how to express them more effectively, figure out what my limits are and how to act accordingly, allow myself to experience feelings and express them more effectively and learn how to deal with strong negative feelings (e.g., anger, rage)  learn how to cope with negative thoughts, ruminations, or sense of guilt, find a way out of negative mood, sadness, or sense of inner emptiness, learn how be more organized in daily life and learn how to handle stressful situations better    STRENGTHS: Motivated for treatment, Literate, Articulate and Has insight    WEAKNESSES: Poor social support and Poor coping skills    Plan   Crisis Plan:  Symptoms and/or behaviors to indicate a crisis: Thinking about suicide    What calming techniques or other strategies will patient use to de-esclate and stay safe: slow down, breathe, visualize calming self, think it though, listen to music, change focus, take a walk  Who is one person patient can contact to assist with de-escalation? Daughter, son    Crisis Management: the Patient will contact staff or crisis line if symptoms exacerbate or if harm to self or others becomes a concern. Crisis resources include: Crisis Line [phone], 911, Local Law Enforcement, [facility], [facility] 24/7 Emergency Room [phone].    PLAN:   Will continue in MONTHLY or AS NEEDED ongoing outpatient treatment via Face-to-Facewith primary therapist and pharmacotherapy as scheduled.   Will  report any adverse reactions to treatment/medication interventions immediately.  Will be compliant with treatment and appointments.   June 6, 2022 14:28 EDT    Recommended Referrals: Psychiatrist/APRN and Medical Provider (PCP)  Patient  will adhere to medication regimen as prescribed and report any side effects. Patient will contact this office, call 911 or present to the nearest emergency room should suicidal or homicidal ideations occur. Provide Cognitive Behavioral Therapy and Solution Focused Therapy to improve functioning, maintain stability, and avoid decompensation and the need for higher level of care.          Future Appointments       Provider Department Center    6/20/2022 1:00 PM Yumiko Alejandre LCSW [facility]    7/5/2022 1:00 PM Yumiko Alejandre LCSW [facility]    7/26/2022 12:00 PM Yumiko Alejandre LCSW [facility]    8/8/2022 12:00 PM Yumiko Alejandre LCSW [facility]    8/18/2022 2:00 PM Haim Hill MD [facility]    8/26/2022 1:20 PM TAY BR SCREENING [facility] [address]    3/27/2023 1:30 PM Daniel Ziegler MD [facility]YN [facility]          June 6, 2022 14:28 EDT      Yumiko Miles LCSW, LC[facility]

## 2022-06-20 ENCOUNTER — OFFICE VISIT (OUTPATIENT)
Dept: PSYCHIATRY | Facility: CLINIC | Age: 72
End: 2022-06-20

## 2022-06-20 DIAGNOSIS — F41.1 GAD (GENERALIZED ANXIETY DISORDER): Primary | ICD-10-CM

## 2022-06-20 DIAGNOSIS — F34.1 DYSTHYMIC DISORDER: ICD-10-CM

## 2022-06-20 PROCEDURE — 90837 PSYTX W PT 60 MINUTES: CPT | Performed by: SOCIAL WORKER

## 2022-06-20 NOTE — PROGRESS NOTES
Date of Service: June 20, 2022  Time In: 1:00 pm  Time Out: 1:55 pm    PROGRESS NOTE  Data:The patient is a 72 y.o. person who met 1:1 with Yumiko Miles LCSW, LCADC for regularly scheduled individual session.Verified the patients identity.    Maggie presents for session on time, clean and casually dressed  without evidence of intoxication, withdrawal, or perceptual disturbance.   The patient was open and engaged.    Chief Compliant: Patient presents with anxiety & depression   Interactive Complexity: Interactive Complexity No    HPI: Data:  Patient shares that she is doing ok, very busy lately.  Patients daughter and grandchildren are are staying a few days with patient while they attend a camp near the patient. Shared updates with children.  Patient updates on sewing machine. Discussed insight into how having to take care of the garden is a good thing.   Depression Low energy and Impaired concentration  Generalized Anxiety  Excess Worry, Restless/Edgy, Easily fatigued, Muscle tension and Decreased concentration. Onset of symptoms was vague.  Symptoms are associated with financial burdens and lack of support.  Symptoms are aggravated by anxiety, sadness and stress.   Symptoms improve with medication management, therapy and personal self-care (wellness) Current rates severity of symptoms, on a scale of 1-10 (10 is the most severe) 5 Context Family and social history was reviewed  Quality been intermittent without a consistent pattern.    CLINICAL MANEUVERING/INTERVENTION/SUPPORTIVE PSYCHOTHERAPY: Therapist continued to promote the therapeutic alliance, address the patient’s issues, and strengthen self awareness, insights, and coping skills. Discussed recent flow sheet results and what she might want to discuss with Dr. Hill. Praised for insight and her change in her interaction style with  and how she can remain cautiously optimistic that  could get better(she acknowledges this is  unlikely to happen).Discussed 12 step support.      Therapist applied CBT/REBT, Exploration of Coping Skills and Mindfulness Training and encouraged the patient to use positive coping skills such as Drawing/Art, Reframe the way you are thinking about the problem, Self Care (Take care of your body in a way that makes you feel good - paint your nails, do your hair, put on a face mask), Establish healthy boundaries , Use positive self-talk, Keep a positive attitude and Utilize resources/coping skills.  Therapist allowed Maggie  to freely discuss issues without interruption or judgment. Provided safe, confidential environment to facilitate the development of positive therapeutic relationship and encourage open, honest communication. Assisted patient in identifying increased risk factors which would indicate the need for higher level of care including thoughts to harm self or others, self-harming behavior, and/or binge drinking and encouraged patient to contact this office, call 911, or present to the nearest emergency room should any of these events occur. Discussed crisis intervention services and means to access.    ADHD screener was positive, ADHD checklist indicates deficiets  MIRIAM 7  16  PHQ 9   16    Risk Assessment:  [x] No SI/HI, []  passive thoughts []  suicidal ideation , []  homicidal ideation, [] self-harm Explain Risk of self-harm is low, but could be further elevated in the event of treatment noncompliance and/or AODA.  Assessment      Psychiatric/Behavioral: Negative for agitation, behavioral problems, decreased concentration, dysphoric mood, hallucinations, self-injury, sleep disturbance, suicidal ideas, negative for hyperactivity. Positive for depressed mood and stress. The patient is nervous/anxious.    Mental Status Exam:    Hygiene:   good  Cooperation:  Cooperative  Eye Contact:  Good  Psychomotor Behavior:  Appropriate  Affect:  Appropriate  Hopelessness: 2  Speech:  Normal  Goal directed and  Linear  Thought Content:  Normal and Mood congruent  Suicidal:  None  Homicidal:  None  Hallucinations:  None  Delusion:  None  Memory:  Intact  Orientation:  Person, Place, Time and Situation  Reliability:  good  Insight:  Good  Judgement:  Good  Impulse Control:  Good and Fair    Patient's Support Network Includes:  , children and extended family  Progress toward goal: Not at goal  Functional Status: Moderate impairment   Overall: Anxious   VISIT DIAGNOSIS:     ICD-10-CM ICD-9-CM   1. MIRIAM (generalized anxiety disorder)  F41.1 300.02    attention and focus problems    PROGNOSIS: fair if patient follows treatment recommendations    The patient appears to be mentally/physically stable compared to his baseline functioning.  However, they continues to present with a severe chronic mental illness. As a result,  they would be at significantly increased risk for decompensation and possibly higher level of care without continued treatment.  It is reasonable to assume they would considerably benefit from ongoing treatment.          PROGRESS TOWARD CURRENT PLAN OF CARE/TREATMENT PLAN:  Making Progress    SHORT-TERM GOALS: The patient will  will learn and practice at least 2 anxiety management techniques with goal of decreasing anxiety, will work with therapist to help expose and extinguish irrational beliefs and conclusions that contribute to anxiety/depression, will work with therapist to identify conflicts from the past and the present that form the basis, will engage in one self-care activity daily, per self-report and will engage in one enjoyable activity daily, per self-report     LONG-TERM GOALS: With the help of therapy, I would like to:   learn how to relax and take it easy and learn how to enjoy life and have fun  clarify or come to terms with expectations or feelings related to my partner, spouse, or significant other, learn how to be more assertive with others and set appropriate boundaries and learn how to  handle other people's reactions to my behavior (criticism, rejection, praise, etc.).  understand more clearly who I am, what I' m capable of, and what I want out of life  clarify my needs and desires and learn how to express them more effectively, figure out what my limits are and how to act accordingly, allow myself to experience feelings and express them more effectively and learn how to deal with strong negative feelings (e.g., anger, rage)  learn how to cope with negative thoughts, ruminations, or sense of guilt, gain more drive and energy, learn how be more organized in daily life and learn how to handle stressful situations better    STRENGTHS: Motivated for treatment, Literate and Articulate    WEAKNESSES: Poor social support and Poor coping skills    Plan   Crisis Plan:  Symptoms and/or behaviors to indicate a crisis: Thinking about suicide    What calming techniques or other strategies will patient use to de-esclate and stay safe: slow down, breathe, visualize calming self, think it though, listen to music, change focus, take a walk  Who is one person patient can contact to assist with de-escalation? Daughter    Crisis Management: the Patient will contact staff or crisis line if symptoms exacerbate or if harm to self or others becomes a concern. Crisis resources include: Crisis Line [phone], 911, Local Law Enforcement, [facility] [place], [facility] 24/7 Emergency Room [phone].    PLAN:   Will continue in MONTHLY or AS NEEDED ongoing outpatient treatment via Face-to-Facewith primary therapist and pharmacotherapy as scheduled.   Will  report any adverse reactions to treatment/medication interventions immediately.  Will be compliant with treatment and appointments.   June 20, 2022 13:01 EDT    Recommended Referrals: Psychiatrist/APRN  Patient will adhere to medication regimen as prescribed and report any side effects. Patient will contact this office, call 911 or present to the nearest emergency room should  suicidal or homicidal ideations occur. Provide Cognitive Behavioral Therapy and Solution Focused Therapy to improve functioning, maintain stability, and avoid decompensation and the need for higher level of care.          Future Appointments       Provider Department Center    7/5/2022 1:00 PM Yumiko Alejandre LCSW [facility]    7/26/2022 12:00 PM Yumiko Alejandre LCSW [facility]    8/8/2022 12:00 PM Yumiko Alejandre LCSW [facility]    8/18/2022 2:00 PM Haim Hill MD [facility]    8/26/2022 1:20 PM TAY BR SCREENING [facility] [address]    3/27/2023 1:30 PM Daniel Ziegler MD [facility] OB GYN [facility]          June 20, 2022 13:01 EDT      Yumiko Miles LCSW, [facility]

## 2022-07-13 DIAGNOSIS — F34.1 DYSTHYMIC DISORDER: ICD-10-CM

## 2022-07-14 RX ORDER — ESZOPICLONE 3 MG/1
3 TABLET, FILM COATED ORAL NIGHTLY PRN
Qty: 90 TABLET | Refills: 0 | Status: SHIPPED | OUTPATIENT
Start: 2022-07-14 | End: 2022-08-18 | Stop reason: SDUPTHER

## 2022-08-08 ENCOUNTER — OFFICE VISIT (OUTPATIENT)
Dept: PSYCHIATRY | Facility: CLINIC | Age: 72
End: 2022-08-08

## 2022-08-08 DIAGNOSIS — F41.1 GAD (GENERALIZED ANXIETY DISORDER): Primary | ICD-10-CM

## 2022-08-08 DIAGNOSIS — F34.1 DYSTHYMIC DISORDER: ICD-10-CM

## 2022-08-08 PROCEDURE — 90837 PSYTX W PT 60 MINUTES: CPT | Performed by: SOCIAL WORKER

## 2022-08-08 NOTE — PROGRESS NOTES
Date of Service: August 8, 2022  Time In: 11:58 am  Time Out: 12:53 am    PROGRESS NOTE  Data:The patient is a 72 y.o. person who met 1:1 with Yumiko Miles LCSW, LCADC for regularly scheduled individual session.Verified the patients identity.  The Patient is  at home, using Epic Video Visit (HIPAA compliant). Patient is being seen via telehealth and stated they are in a secure environment for this session. The patient's condition being diagnosed/treated is appropriate for telemedicine. The provider identified herself and credentials CRISTELA CARDENAS .   The patient  consent to be seen remotely, and when consent is given they understand that the consent allows for patient identifiable information to be sent to a third party as needed.   They may refuse to be seen remotely at any time. The electronic data is encrypted and password protected, and the patient has been advised of the potential risks to privacy not withstanding such measured of the potential risks to privacy not withstanding such measures.    Maggie presents for session on time, clean and casually dressed  without evidence of intoxication, withdrawal, or perceptual disturbance.   The patient was open and engaged.      Chief Compliant: Patient presents with anxiety, depression    Interactive Complexity: none  HPI: Data:  Patient provides updates and shares recent CVOID infection with daughter/families and the stress that this has created.  Patient expresses concern for daughters overworking. This daughter cant seem to find time to have the patient visit.   called daughter who asked the daughter to come visit.  Shares that her sewing machine is not working and she is disappointed  That she does not have a machine to do her quilting.  Patient shares that one of the 21 yr old twins girlfriend is pregnant and due in March 2023.  Plans a visit to go see son in [place] in October to watch the 12 year old.  She tries to make plans on how to see her  family with travel.  Has helped out with  for daughter in [place]. Daughter was placed on medication for ADHD and appears to be much more organized.   Patient shares that she is having a lot of arthritic pain when first wakening to the point that it interferes with sleep and at first thing in the morning she is in pain.  Has been trying to walk daily and work in the garden until she is breathing hard. Has continued to have difficulty getting organized and starting a project but does enjoy things she likes.  Continues to have symptoms of anxiety feeling restless, worries about many different things, muscle tension, irritable mood, feeling restless, has poor concentration, low energy, lack of motivation, feelings of worthless, discouraged about life at times, difficulty completing tasks, lacks focus at times.  vague.  Symptoms are associated with financial burdens, chronic pain/pain management and lack of support.  Symptoms are aggravated by anxiety, lonely and stress.   Symptoms improve with medication management, therapy and personal self-care (wellness) Current rates severity of symptoms, on a scale of 1-10 (10 is the most severe) 5 Context Family and social history was reviewed Quality been intermittent without a consistent pattern.        CLINICAL MANEUVERING/INTERVENTION/SUPPORTIVE PSYCHOTHERAPY: Therapist continued to promote the therapeutic alliance, address the patient’s issues, and strengthen self awareness, insights, and coping skills.  Patient shares is reviewed codependency and the progress in trying to enjoy the good days. Normalzing the patient expressions.  Validated her feelings while she shares feelings.   Discussed restlessness and irritability.Therapist applied CBT/REBT and Exploration of Coping Skills and encouraged the patient to use positive coping skills such as Exercising, Drawing/Art, Playing with a pet, Spending time in nature, Reframe the way you are thinking about the problem,  Self Care (Take care of your body in a way that makes you feel good - paint your nails, do your hair, put on a face mask), Use positive self-talk, Keep a positive attitude, Keep calm by thinking and Utilize resources/coping skills.  Therapist allowed Maggie  to freely discuss issues without interruption or judgment. Provided safe, confidential environment to facilitate the development of positive therapeutic relationship and encourage open, honest communication. Assisted patient in identifying increased risk factors which would indicate the need for higher level of care including thoughts to harm self or others, self-harming behavior, and/or binge drinking and encouraged patient to contact this office, call 911, or present to the nearest emergency room should any of these events occur. Discussed crisis intervention services and means to access.    PHQ-9 Total Score:    MIRIAM-7    12     Risk Assessment:  [x] No SI/HI, []  passive thoughts []  suicidal ideation , []  homicidal ideation, [] self-harm Explain Risk of self-harm is  low, but could be further elevated in the event of treatment noncompliance and/or AODA.  Assessment          Psychiatric/Behavioral: Negative for agitation, behavioral problems, hallucinations, self-injury, sleep disturbance, suicidal ideas, negative for hyperactivity. Positive for decreased concentration, depressed mood and stress. The patient is nervous/anxious.      Mental Status Exam:    Hygiene:   fair  Cooperation:  Cooperative  Eye Contact:  Good  Psychomotor Behavior:  Appropriate  Affect:  Full range and Appropriate  Hopelessness: 1  Speech:  Normal  Goal directed and Linear  Thought Content:  Normal and Mood congruent  Suicidal:  None  Homicidal:  None  Hallucinations:  None  Delusion:  None  Memory:  Intact  Orientation:  Person, Place, Time and Situation  Reliability:  good  Insight:  Good and Fair  Judgement:  Good  Impulse Control:  Fair    Patient's Support Network Includes:    and children    Progress toward goal: Not at goal    Functional Status: Moderate impairment     Overall: Anxious     VISIT DIAGNOSIS:     ICD-10-CM ICD-9-CM   1. MIRIAM (generalized anxiety disorder)  F41.1 300.02   2. Dysthymic disorder  F34.1 300.4        PROGNOSIS: fair if patient follows treatment recommendations    The patient appears to be mentally/physically stable compared to his baseline functioning.  However, they continues to present with a severe chronic mental illness. As a result,  they would be at significantly increased risk for decompensation and possibly higher level of care without continued treatment.  It is reasonable to assume they would considerably benefit from ongoing treatment.          PROGRESS TOWARD CURRENT PLAN OF CARE/TREATMENT PLAN:  Making Progress    SHORT-TERM GOALS: The patient will  will learn and practice at least 2 anxiety management techniques with goal of decreasing anxiety, will learn and practice at least 2 depression management techniques with goal of decreasing depression, will engage in one self-care activity daily, per self-report, will engage in one enjoyable activity daily, per self-report  and will stay away from all negative influences daily, per self-report    LONG-TERM GOALS: With the help of therapy, I would like to:   learn how to structure my spare-time more meaningfully (hobbies, etc), learn how to relax and take it easy and learn how to enjoy life and have fun  clarify or come to terms with expectations or feelings related to my partner, spouse, or significant other, learn how to handle other people's reactions to my behavior (criticism, rejection, praise, etc.). and learn how to connect with other people (and how to maintain relationships)  understand more clearly who I am, what I' m capable of, and what I want out of life  clarify my needs and desires and learn how to express them more effectively, figure out what my limits are and how to act  accordingly, learn how to finish projects I've started, allow myself to experience feelings and express them more effectively and learn how to deal with strong negative feelings (e.g., anger, rage)  learn how to cope with negative thoughts, ruminations, or sense of guilt, find a way out of negative mood, sadness, or sense of inner emptiness, learn how to master anxiety or panic attacks, learn how to reduce or cope with physical pain, learn how be more organized in daily life and learn how to handle stressful situations better    STRENGTHS: Motivated for treatment, Literate and Articulate    WEAKNESSES: Poor social support and Poor coping skills    Plan   Crisis Plan:  Symptoms and/or behaviors to indicate a crisis: Difficulty perceiving reality  and Thinking about suicide    What calming techniques or other strategies will patient use to de-esclate and stay safe: slow down, breathe, visualize calming self, think it though, listen to music, change focus, take a walk  Who is one person patient can contact to assist with de-escalation? Daughter    Crisis Management: the Patient will contact staff or crisis line if symptoms exacerbate or if harm to self or others becomes a concern. Crisis resources include: Crisis Line [phone], 911, Local Law Enforcement, [place] [facility], [facility] 24/7 Emergency Room [phone].    PLAN:   Will continue in MONTHLY or AS NEEDED ongoing outpatient treatment via Face-to-Facewith primary therapist and pharmacotherapy as scheduled.   Will  report any adverse reactions to treatment/medication interventions immediately.  Will be compliant with treatment and appointments.   August 8, 2022 12:00 EDT    Recommended Referrals: Psychiatrist/APRN and Medical Provider (PCP)  Patient will adhere to medication regimen as prescribed and report any side effects. Patient will contact this office, call [phone] or present to the nearest emergency room should suicidal or homicidal ideations occur. Provide  Cognitive Behavioral Therapy and Solution Focused Therapy to improve functioning, maintain stability, and avoid decompensation and the need for higher level of care.          Future Appointments       Provider Department Center    8/18/2022 2:00 PM Haim Hill MD [facility]    8/22/2022 1:00 PM Yumiko Alejandre LCSW [facility]    8/26/2022 1:20 PM TAY BR SCREENING [facility] [address]    9/6/2022 1:00 PM Yumiko Alejandre LCSW [facility]    3/27/2023 1:30 PM Daniel Ziegler MD [facility] OB GYN [facility]          August 8, 2022 12:00 EDT      Yumiko Miles LCSW, [facility]

## 2022-08-18 ENCOUNTER — OFFICE VISIT (OUTPATIENT)
Dept: PSYCHIATRY | Facility: CLINIC | Age: 72
End: 2022-08-18

## 2022-08-18 VITALS
HEART RATE: 58 BPM | HEIGHT: 62 IN | DIASTOLIC BLOOD PRESSURE: 78 MMHG | BODY MASS INDEX: 32.2 KG/M2 | WEIGHT: 175 LBS | SYSTOLIC BLOOD PRESSURE: 112 MMHG

## 2022-08-18 DIAGNOSIS — F34.1 DYSTHYMIC DISORDER: Primary | ICD-10-CM

## 2022-08-18 DIAGNOSIS — F41.1 GAD (GENERALIZED ANXIETY DISORDER): ICD-10-CM

## 2022-08-18 DIAGNOSIS — F51.01 PRIMARY INSOMNIA: ICD-10-CM

## 2022-08-18 PROCEDURE — 99214 OFFICE O/P EST MOD 30 MIN: CPT | Performed by: PSYCHIATRY & NEUROLOGY

## 2022-08-18 RX ORDER — ESZOPICLONE 3 MG/1
3 TABLET, FILM COATED ORAL NIGHTLY PRN
Qty: 90 TABLET | Refills: 0 | Status: SHIPPED | OUTPATIENT
Start: 2022-08-18 | End: 2022-10-17 | Stop reason: SDUPTHER

## 2022-08-18 RX ORDER — DULOXETIN HYDROCHLORIDE 60 MG/1
60 CAPSULE, DELAYED RELEASE ORAL DAILY
Qty: 90 CAPSULE | Refills: 1 | Status: SHIPPED | OUTPATIENT
Start: 2022-08-18 | End: 2023-02-02 | Stop reason: SDUPTHER

## 2022-08-18 NOTE — PROGRESS NOTES
"Patient ID: Maggie Emery is a 72 y.o. female    SERVICE TYPE: EVALUATION AND MANAGEMENT (greater than 50% of the time spent for supportive psychotherapy).      /78   Pulse 58   Ht 156.2 cm (61.5\")   Wt 79.4 kg (175 lb)   BMI 32.53 kg/m²     ALLERGIES:  Ambien [zolpidem tartrate], Darvon [propoxyphene], Hydrocodone, and Ibuprofen    CC/ Focus of the visit: Depression    HPI: Patient reports she has not had a clinical episode of depression since last contact but continues to struggle with her family circumstance, the alcoholic antagonistic stubborn .  Patient continues to work with her therapist here on coping strategies and stays busy with her children and grandchildren as she best can considering her limitations.  As periods when she has difficulty sleeping even while continuing to take the Lunesta and Cymbalta.  Patient is not prescribed opiates for pain.    PFSH: Reviewed the therapist notes regarding patient's family activity and interactions.    Review of Systems  No cardiopulmonary, GI  Complaints.  Patient having difficulty with pelvic pain as she relates to L5-S1 issues.  Patient is in treatment for this.    SUPPORTIVE PSYCHOTHERAPY: continuing efforts to promote the therapeutic alliance, address the patient’s issues, and strengthen self awareness, insights, and positive coping skills such as Exercising, listen to music, spending time in nature and utilizing resources.     Mental Status Exam  Appearance:  appropriate  Attitude toward clinician:  cooperative and agreeable   Speech:    Rate:  regular rate and rhythm   Volume: normal  Motor:  no abnormal movements   Mood:  Good  Affect:  euthymic  Thought Processes:  linear, logical, and goal directed  Thought Content:  Normal   Feeling Hopeless: absent  Suicidal Thoughts or Intent:  absent  Homicidal Thoughts:  absent  Perceptual Disturbance: no perceptual disturbance  Attention and Concentration:  good  Insight and Judgement:  good  Memory:  " memory appears to be intact    LABS: No results found for this or any previous visit (from the past 168 hour(s)).    MEDICATION ISSUES: Have discussed with the patient the medications Risks, Benefits, and Side effects including potential falls, possible impaired driving and  metabolic adversities among others. No medication side effects or related complaints today.     TREATMENT PLAN/GOALS: Continue supportive psychotherapy efforts and medications as indicated.     Current Outpatient Medications   Medication Sig Dispense Refill   • atenolol (TENORMIN) 50 MG tablet Take 50 mg by mouth Daily.     • dilTIAZem CD (CARDIZEM CD) 300 MG 24 hr capsule Take 300 mg by mouth Daily.     • DULoxetine (CYMBALTA) 60 MG capsule Take 1 capsule by mouth Daily. 90 capsule 1   • eszopiclone (Lunesta) 3 MG tablet Take 1 tablet by mouth At Night As Needed for Sleep. Take immediately before bedtime 90 tablet 0   • fluticasone (FLONASE) 50 MCG/ACT nasal spray Flonase Allergy Relief 50 mcg/actuation nasal spray,suspension   2 sprays each nostril     • lansoprazole (PREVACID) 30 MG capsule Take 30 mg by mouth Daily.     • losartan-hydrochlorothiazide (HYZAAR) 100-25 MG per tablet Take 1 tablet by mouth Daily.     • Multiple Vitamins-Minerals (Systane ICaps AREDS2) capsule ICaps AREDS2     • Potassium Chloride (KCL-20 PO) Take 20 tablets by mouth Daily.     • pravastatin (PRAVACHOL) 80 MG tablet Daily.     • rizatriptan MLT (MAXALT-MLT) 5 MG disintegrating tablet Take 5 mg by mouth 1 (One) Time As Needed for Migraine. May repeat in 2 hours if needed       No current facility-administered medications for this visit.       COLLATERAL PSYCHOTHERAPEUTIC INTERVENTION: Patient continuing with Yumiko Palencia LCSW with every 2 weeks sessions..    RISK: Moderate    Assessment & Plan     Diagnoses and all orders for this visit:    1. Dysthymic disorder (Primary)  -     DULoxetine (CYMBALTA) 60 MG capsule; Take 1 capsule by mouth Daily.  Dispense: 90  capsule; Refill: 1  -     eszopiclone (Lunesta) 3 MG tablet; Take 1 tablet by mouth At Night As Needed for Sleep. Take immediately before bedtime  Dispense: 90 tablet; Refill: 0    2. MIRIAM (generalized anxiety disorder)  -     DULoxetine (CYMBALTA) 60 MG capsule; Take 1 capsule by mouth Daily.  Dispense: 90 capsule; Refill: 1    3. Primary insomnia  -     eszopiclone (Lunesta) 3 MG tablet; Take 1 tablet by mouth At Night As Needed for Sleep. Take immediately before bedtime  Dispense: 90 tablet; Refill: 0        Return in about 5 months (around 1/18/2023).           Patient knows to call if symptoms worsen or fail to improve between appointments.    I spent 30 minutes caring for Maggie on this date of service. This time includes time spent by me in the following activities: Patient evaluation, support psychotherapy, decisions, medications, and documentation.     Dictated utilizing Monstrous dictation    MAKSIM Hill MD

## 2022-08-22 ENCOUNTER — OFFICE VISIT (OUTPATIENT)
Dept: PSYCHIATRY | Facility: CLINIC | Age: 72
End: 2022-08-22

## 2022-08-22 DIAGNOSIS — F41.1 GAD (GENERALIZED ANXIETY DISORDER): Primary | ICD-10-CM

## 2022-08-22 DIAGNOSIS — F34.1 DYSTHYMIC DISORDER: ICD-10-CM

## 2022-08-22 PROCEDURE — 90837 PSYTX W PT 60 MINUTES: CPT | Performed by: SOCIAL WORKER

## 2022-08-22 NOTE — PROGRESS NOTES
"Date of Service: August 22, 2022  Time In: 12:55 pm  Time Out: 1:50 pm    PROGRESS NOTE  Data:The patient is a 72 y.o. person who [facility] for regularly scheduled individual session.Verified the patients identity.    Maggie presents for session on time, clean and casually dressed  without evidence of intoxication, withdrawal, or perceptual disturbance.   The patient was open and engaged.      Chief Compliant: Patient presents with anxiety and depression  Interactive Complexity: N/A    HPI: Data:  Provides updates about family and relationship with . Has not had time to craft as much as she would have liked.  Has several projects that need to be completed.  Was able to can some of the tomatoes she grew which she enjoyed.  Discussed wanting things to look pretty.  Depression Low mood for > 2 weeks, Decreased/Increased sleep, Feelings of guilt/worthlessness and Impaired concentration  Generalized Anxiety  Excess Worry, Restless/Edgy, Easily fatigued, Muscle tension, Decreased sleep and Decreased concentration. Onset of symptoms was vague.  Symptoms are associated with lack of support.  Symptoms are aggravated by anxiety, lonely, sadness and stress.   Symptoms improve with medication management, therapy, personal self-care (wellness) and 12-step programs Current rates severity of symptoms, on a scale of 1-10 (10 is the most severe) 6 Context Family and social history was reviewed  Quality been intermittent without a consistent pattern.    CLINICAL MANEUVERING/INTERVENTION/SUPPORTIVE PSYCHOTHERAPY: Therapist continued to promote the therapeutic alliance, address the patient’s issues, and strengthen self awareness, insights, and coping skills.  Praised the patient for being able to \"pause\" when stressed or when  is angry. Continued to address the cognitive distortions especially should stalemates or discounting the positive.  Therapist applied CBT/REBT and Exploration " of Coping Skills and encouraged the patient to use positive coping skills such as Exercising, Drawing/Art, Use positive self-talk, Keep a positive attitude, Make a list of choices (pros/cons), Keep calm by thinking and Utilize resources/coping skills.  Therapist allowed Maggie  to freely discuss issues without interruption or judgment. Provided safe, confidential environment to facilitate the development of positive therapeutic relationship and encourage open, honest communication. Assisted patient in identifying increased risk factors which would indicate the need for higher level of care including thoughts to harm self or others, self-harming behavior, and/or binge drinking and encouraged patient to contact this office, call 911, or present to the nearest emergency room should any of these events occur. Discussed crisis intervention services and means to access.  Risk Assessment:  [x] No SI/HI, []  passive thoughts []  suicidal ideation , []  homicidal ideation, [] self-harm Explain Risk of self-harm is low, but could be further elevated in the event of treatment noncompliance and/or AODA.  Assessment   Psychiatric/Behavioral: Negative for agitation, behavioral problems, decreased concentration, hallucinations, self-injury, sleep disturbance, suicidal ideas, negative for hyperactivity. Positive for dysphoric mood, sleep disturbance and stress. The patient is nervous/anxious.      Mental Status Exam:    Hygiene:   good  Cooperation:  Cooperative  Eye Contact:  Good  Psychomotor Behavior:  Appropriate  Affect:  Full range and Appropriate  Hopelessness: 2  Speech:  Normal  Goal directed and Linear  Thought Content:  Normal and Mood congruent  Suicidal:  None  Homicidal:  None  Hallucinations:  None  Delusion:  None  Memory:  grossly intact  Orientation:  Person, Place, Time and Situation  Reliability:  good  Insight:  Good  Judgement:  Good  Impulse Control:  Good    Patient's Support Network Includes:  children and  extended family  Progress toward goal: Not at goal  Functional Status: Moderate impairment     Overall: Anxious   VISIT DIAGNOSIS:     ICD-10-CM ICD-9-CM   1. MIRIAM (generalized anxiety disorder)  F41.1 300.02   2. Dysthymic disorder  F34.1 300.4        PROGNOSIS: good if patient follows treatment recommendations  The patient appears to be mentally/physically stable compared to his baseline functioning.  However, they continues to present with a severe chronic mental illness. As a result,  they would be at significantly increased risk for decompensation and possibly higher level of care without continued treatment.  It is reasonable to assume they would considerably benefit from ongoing treatment.          PROGRESS TOWARD CURRENT PLAN OF CARE/TREATMENT PLAN:  Making Progress  SHORT-TERM GOALS: The patient will  will work with therapist to help expose and extinguish irrational beliefs and conclusions that contribute to anxiety/depression, will work with therapist to identify conflicts from the past and the present that form the basis, will engage in one self-care activity daily, per self-report and will engage in one enjoyable activity daily, per self-report   LONG-TERM GOALS: With the help of therapy, I would like to:   learn how to structure my spare-time more meaningfully (hobbies, etc) and learn how to enjoy life and have fun  clarify or come to terms with expectations or feelings related to my partner, spouse, or significant other, learn how to be more assertive with others and set appropriate boundaries and learn how to handle other people's reactions to my behavior (criticism, rejection, praise, etc.).  understand more clearly who I am, what I' m capable of, and what I want out of life  clarify my needs and desires and learn how to express them more effectively, learn how to finish projects I've started, allow myself to experience feelings and express them more effectively and learn how to deal with strong  negative feelings (e.g., anger, rage)  learn how to cope with negative thoughts, ruminations, or sense of guilt, find a way out of negative mood, sadness, or sense of inner emptiness, learn how to reduce or cope with physical pain, learn how be more organized in daily life and learn how to handle stressful situations better    STRENGTHS: Motivated for treatment, Literate and Articulate  WEAKNESSES: Poor social support and Poor coping skills    Plan   Crisis Plan:  Symptoms and/or behaviors to indicate a crisis: Thinking about suicide    What calming techniques or other strategies will patient use to de-esclate and stay safe: slow down, breathe, visualize calming self, think it though, listen to music, change focus, take a walk  Who is one person patient can contact to assist with de-escalation? Daughters    Crisis Management: the Patient will contact staff or crisis line if symptoms exacerbate or if harm to self or others becomes a concern. Crisis resources include: Crisis Line [phone], 911, Local Law Enforcement, [facility], [facility] 24/7 Emergency Room [phone].    PLAN:   Will continue in BI-WEEKLY or MONTHLY ongoing outpatient treatment via Face-to-Facewith primary therapist and pharmacotherapy as scheduled.   Will  report any adverse reactions to treatment/medication interventions immediately.  Will be compliant with treatment and appointments.   August 22, 2022 12:54 EDT    Recommended Referrals: Psychiatrist/APRN and Medical Provider (PCP)  Patient will adhere to medication regimen as prescribed and report any side effects. Patient will contact this office, call [phone] or present to the nearest emergency room should suicidal or homicidal ideations occur. Provide Cognitive Behavioral Therapy and Solution Focused Therapy to improve functioning, maintain stability, and avoid decompensation and the need for higher level of care.          Future Appointments       Provider Department Center    8/22/2022  1:00 PM Yumiko Alejandre LCSW [facility]    8/26/2022 1:20 PM [facility] BR SCREENING [facility] [address]    9/6/2022 1:00 PM Yumiko Alejandre LCSW [facility]    9/20/2022 11:00 AM Yumiko Alejandre LCSW [facility]    1/19/2023 2:00 PM Haim Hill MD [facility]    3/27/2023 1:30 PM Daniel Ziegler MD [facility]          August 22, 2022 12:54 EDT      Yumiko Miles LCSW, GOPALRI

## 2022-08-26 ENCOUNTER — HOSPITAL ENCOUNTER (OUTPATIENT)
Dept: MAMMOGRAPHY | Facility: HOSPITAL | Age: 72
Discharge: HOME | End: 2022-08-26
Admitting: OBSTETRICS & GYNECOLOGY

## 2022-08-26 DIAGNOSIS — Z12.31 VISIT FOR SCREENING MAMMOGRAM: ICD-10-CM

## 2022-08-26 PROCEDURE — 77067 SCR MAMMO BI INCL CAD: CPT

## 2022-08-26 PROCEDURE — 77063 BREAST TOMOSYNTHESIS BI: CPT | Performed by: RADIOLOGY

## 2022-08-26 PROCEDURE — 77063 BREAST TOMOSYNTHESIS BI: CPT

## 2022-08-26 PROCEDURE — 77067 SCR MAMMO BI INCL CAD: CPT | Performed by: RADIOLOGY

## 2022-09-07 ENCOUNTER — OFFICE VISIT (OUTPATIENT)
Dept: PSYCHIATRY | Facility: CLINIC | Age: 72
End: 2022-09-07

## 2022-09-07 DIAGNOSIS — F34.1 DYSTHYMIC DISORDER: ICD-10-CM

## 2022-09-07 DIAGNOSIS — F41.1 GAD (GENERALIZED ANXIETY DISORDER): Primary | ICD-10-CM

## 2022-09-07 PROCEDURE — 90837 PSYTX W PT 60 MINUTES: CPT | Performed by: SOCIAL WORKER

## 2022-09-07 NOTE — TREATMENT PLAN
Multi-Disciplinary Problems (from Behavioral Health Treatment Plan)    Active Problems     Problem: Anxiety  Start Date: 05/11/20    Problem Details:  The patient self-scales this problem as a 4 with 10 being the worst.       Goal Priority Start Date Expected End Date End Date    Patient will develop and implement behavioral and cognitive strategies to reduce anxiety and irrational fears. -- 05/11/20  --    Goal Details:  Progress toward goal:  The patient self-scales their progress related to this goal as a 3-6 with 10 being the worst this depends of the life stressors that are happening.  Overall she is making progress and having success at times being able to challenge the negative thoughts that happen       Goal Intervention Frequency Start Date End Date    Help patient explore past emotional issues in relation to present anxiety. Q2 Weeks 05/11/20     Intervention Details:  Duration of treatment until until remission of symptoms.       Goal Intervention Frequency Start Date End Date    Help patient develop an awareness of their cognitive and physical responses to anxiety. Q Month 05/11/20     Intervention Details:  Duration of treatment until until remission of symptoms.             Problem: Co-Dependency  Start Date: 05/11/20    Problem Details:  The patient self-scales this problem as a 8 with 10 being the worst.       Goal Priority Start Date Expected End Date End Date    Patient will demonstrate ability to set healthy boundaries and meet own needs within a relationship. -- 05/11/20  --    Goal Details:  Progress toward goal:  The patient self-scales their progress related to this goal as a 4 to 7 with 10 being the worst, depending of the amount of stress.   drinking has worsened but patient is reacting less.     Goal Intervention Frequency Start Date End Date    Assist patient in identifying enabling behaviors and healthy boundaries within relationships. Q2 Weeks  05/11/20     Intervention Details:  Duration of treatment until until remission of symptoms.             Problem: Depression  Start Date: 05/11/20    Problem Details:  The patient self-scales this problem as a 4 with 10 being the worst.       Goal Priority Start Date Expected End Date End Date    Patient will demonstrate the ability to initiate new constructive life skills outside of sessions on a consistent basis. -- 05/11/20  --    Goal Details:  Progress toward goal:  The patient self-scales their progress related to this goal as a 7 with 10 being the worst.        Goal Intervention Frequency Start Date End Date    Assist patient in setting attainable activities of daily living goals. PRN 05/11/20     Goal Intervention Frequency Start Date End Date    Provide education about depression PRN 05/11/20     Intervention Details:  Duration of treatment until until remission of symptoms.       Goal Intervention Frequency Start Date End Date    Assist patient in developing healthy coping strategies. PRN 05/11/20     Intervention Details:  Duration of treatment until until remission of symptoms.                     Has had more issues with  being mean, calling her names and overall being difficult which has increased the anxiety & depression.  Husbands behaviors are about the same with patient choosing when she talks with him.  At times she has been able to  not personalize his harsh statements that  makes.  Continues to sew and quilt.  Finds the therapeutic process helpful for assisting her to maintain positive progress and to prevent deterioration of her mood.   I have discussed and reviewed this treatment plan with the patient.

## 2022-09-20 ENCOUNTER — OFFICE VISIT (OUTPATIENT)
Dept: PSYCHIATRY | Facility: CLINIC | Age: 72
End: 2022-09-20

## 2022-09-20 DIAGNOSIS — F34.1 DYSTHYMIC DISORDER: ICD-10-CM

## 2022-09-20 DIAGNOSIS — F41.1 GAD (GENERALIZED ANXIETY DISORDER): Primary | ICD-10-CM

## 2022-09-20 PROCEDURE — 90837 PSYTX W PT 60 MINUTES: CPT | Performed by: SOCIAL WORKER

## 2022-09-20 NOTE — PROGRESS NOTES
Date of Service: September 20, 2022  Time In: 11: 00 am  Time Out: 11:55 am    PROGRESS NOTE  Data:The patient is a 72 y.o. person who met 1:1 with Yumiko Miles LCSW, LCADC for regularly scheduled individual session.Verified the patients identity.    Maggie presents for session on time, clean and casually dressed  without evidence of intoxication, withdrawal, or perceptual disturbance.   The patient was open and engaged.      Chief Compliant: Patient presents with     Interactive Complexity:None    HPI: Data:  Patient shares that she is a little uncomfortable with a UTI. She shares plans to attend celebrate recovery tonight and is somewhat anxious about new places and new people.  Overall she shares nervousness, feeling restless, on edge, at times irritable.  . Patient shares that grand-daughter Mónica is very strong willed and this can be challenging.  She shares that she helps with 2 grandchildren who live close but isn't able to go to the [employer] games because of times of the game and cost of gas.   continues to be about the same -basically she talks when he asks things because it is less stressful. She tries to find a solution that is less stressful.  She has noticed that when she sees other family members going places and enjoying things it makes her very sad.  Patient shares that it is discouraging to see that in long term she is not able to experience life the way she had planned.    Onset of symptoms was vague.  Symptoms are associated with relationship problem with unchanged since last visit, financial burdens and lack of support.  Symptoms are aggravated by anxiety, lonely, sadness and stress.   Symptoms improve with medication management, therapy and personal self-care (wellness) Current rates severity of symptoms, on a scale of 1-10 (10 is the most severe) 6 Context Family and social history was reviewed  Quality improved.      CLINICAL MANEUVERING/INTERVENTION/SUPPORTIVE  PSYCHOTHERAPY: Therapist continued to promote the therapeutic alliance, address the patient’s issues, and strengthen self awareness, insights, and coping skills. Explored patients loneliness and how this impacts her.  Exploring places that the find social interactions.  Does text with sister daily, occasional texts/calls from sister-in-law.  Thinks she could attend bible study at [employer] in the following week. Processing thoughts feelings with the patient to help prevent deterioration of her mood. Introduced focused mindfulness.  Therapist applied CBT/REBT and encouraged the patient to use positive coping skills such as Exercising, Energy redirection, Spending time in nature, Reframe the way you are thinking about the problem, Self Care (Take care of your body in a way that makes you feel good - paint your nails, do your hair, put on a face mask), Establish healthy boundaries , Use positive self-talk, Keep a positive attitude,  Laugh (Do something fun) and Utilize resources/coping skills.  Therapist allowed Maggie  to freely discuss issues without interruption or judgment. Provided safe, confidential environment to facilitate the development of positive therapeutic relationship and encourage open, honest communication. Assisted patient in identifying increased risk factors which would indicate the need for higher level of care including thoughts to harm self or others, self-harming behavior, and/or binge drinking and encouraged patient to contact this office, call 911, or present to the nearest emergency room should any of these events occur. Discussed crisis intervention services and means to access.     Risk Assessment:  [x] No SI/HI, []  passive thoughts []  suicidal ideation , []  homicidal ideation, [] self-harm Explain Risk of self-harm is low, but could be further elevated in the event of treatment noncompliance and/or AODA.  Assessment        Psychiatric/Behavioral: Negative for agitation, behavioral  problems, decreased concentration,  hallucinations, self-injury, sleep disturbance, suicidal ideas, negative for hyperactivity. Positive for dysphoric mood and stress. The patient is nervous/anxious.      Mental Status Exam:    Hygiene:   good  Cooperation:  Cooperative  Eye Contact:  Good  Psychomotor Behavior:  Appropriate  Affect:  Full range and Appropriate  Hopelessness: Denies  Speech:  Normal  Goal directed and Linear  Thought Content:  Normal and Mood congruent  Suicidal:  None  Homicidal:  None  Hallucinations:  None  Delusion:  None  Memory:  Intact  Orientation:  Person, Place, Time and Situation  Reliability:  good  Insight:  Good  Judgement:  Good  Impulse Control:  Good    Patient's Support Network Includes:  daughter    Progress toward goal: Not at goal    Functional Status: Moderate impairment     Overall: Anxious     VISIT DIAGNOSIS:     ICD-10-CM ICD-9-CM   1. MIRIAM (generalized anxiety disorder)  F41.1 300.02   2. Dysthymic disorder  F34.1 300.4        PROGNOSIS: good if patient follows treatment recommendations    The patient appears to be mentally/physically stable compared to his baseline functioning.  However, they continues to present with a severe chronic mental illness. As a result,  they would be at significantly increased risk for decompensation and possibly higher level of care without continued treatment.  It is reasonable to assume they would considerably benefit from ongoing treatment.          PROGRESS TOWARD CURRENT PLAN OF CARE/TREATMENT PLAN:  Making Progress    SHORT-TERM GOALS: The patient will  will learn and practice at least 2 anxiety management techniques with goal of decreasing anxiety, will work with therapist to help expose and extinguish irrational beliefs and conclusions that contribute to anxiety/depression, will work with therapist to identify conflicts from the past and the present that form the basis, will engage in one self-care activity daily, per self-report and  will engage in one enjoyable activity daily, per self-report     LONG-TERM GOALS: With the help of therapy, I would like to:   learn how to structure my spare-time more meaningfully (hobbies, etc), become calmer and more laid-back and learn how to enjoy life and have fun  clarify or come to terms with expectations or feelings related to my partner, spouse, or significant other, learn how to be more assertive with others and set appropriate boundaries, learn how to handle other people's reactions to my behavior (criticism, rejection, praise, etc.). and learn how to connect with other people (and how to maintain relationships)  come to terms with things that happened in the past and understand more clearly who I am, what I' m capable of, and what I want out of life  clarify my needs and desires and learn how to express them more effectively, learn how to finish projects I've started, allow myself to experience feelings and express them more effectively and learn how to deal with strong negative feelings (e.g., anger, rage)  learn how to cope with negative thoughts, ruminations, or sense of guilt, find a way out of negative mood, sadness, or sense of inner emptiness, learn how to master anxiety or panic attacks, learn how be more organized in daily life and learn how to handle stressful situations better    STRENGTHS: Motivated for treatment, Literate and Articulate    WEAKNESSES: Poor social support and Poor coping skills    Plan   Crisis Plan:  Symptoms and/or behaviors to indicate a crisis: Thinking about suicide    What calming techniques or other strategies will patient use to de-esclate and stay safe: slow down, breathe, visualize calming self, think it though, listen to music, change focus, take a walk  Who is one person patient can contact to assist with de-escalation? Daughter    Crisis Management: the Patient will contact staff or crisis line if symptoms exacerbate or if harm to self or others becomes a  concern. Crisis resources include: Crisis Line [phone], 911, Local Law Enforcement, [facility], [facility] 24/7 Emergency Room [phone].    PLAN: Focused mindfulness  Will continue in BI-WEEKLY or MONTHLY ongoing outpatient treatment via Face-to-Facewith primary therapist and pharmacotherapy as scheduled.   Will  report any adverse reactions to treatment/medication interventions immediately.  Will be compliant with treatment and appointments.   September 20, 2022 10:57 EDT    Recommended Referrals: Psychiatrist/APRN and Medical Provider (PCP)  Patient will adhere to medication regimen as prescribed and report any side effects. Patient will contact this office, call 911 or present to the nearest emergency room should suicidal or homicidal ideations occur. Provide Cognitive Behavioral Therapy and Solution Focused Therapy to improve functioning, maintain stability, and avoid decompensation and the need for higher level of care.          Future Appointments       Provider Department Center    9/20/2022 11:00 AM Yumiko Alejandre LCSW [facility]    10/17/2022 1:00 PM Yumiko Alejandre LCSW [facility]    11/1/2022 12:00 PM Yumiko Alejandre LCSW [facility]    1/19/2023 2:00 PM Haim Hill MD [facility]    3/27/2023 1:30 PM Daniel Ziegler MD [facility] [address] [facility]          September 20, 2022 10:57 EDT      Yumiko Miles LCSW, [facility]

## 2022-10-17 ENCOUNTER — OFFICE VISIT (OUTPATIENT)
Dept: PSYCHIATRY | Facility: CLINIC | Age: 72
End: 2022-10-17

## 2022-10-17 DIAGNOSIS — F41.1 GAD (GENERALIZED ANXIETY DISORDER): Primary | ICD-10-CM

## 2022-10-17 DIAGNOSIS — F34.1 DYSTHYMIC DISORDER: ICD-10-CM

## 2022-10-17 DIAGNOSIS — F51.01 PRIMARY INSOMNIA: ICD-10-CM

## 2022-10-17 PROCEDURE — 90837 PSYTX W PT 60 MINUTES: CPT | Performed by: SOCIAL WORKER

## 2022-10-17 RX ORDER — ESZOPICLONE 3 MG/1
3 TABLET, FILM COATED ORAL NIGHTLY PRN
Qty: 3 TABLET | Refills: 0 | Status: SHIPPED | OUTPATIENT
Start: 2022-10-17 | End: 2023-01-04 | Stop reason: SDUPTHER

## 2022-10-17 NOTE — TELEPHONE ENCOUNTER
Tried to contact patient to let her know medication had been sent in to pharmacy but did not get an answer.

## 2022-10-17 NOTE — TELEPHONE ENCOUNTER
Dr Hill's patient. Will you cover?  Patient is needing a 3 day supply of Lunesta sent in due to hers being sent out via mail pharmacy and patient is out of medication right now. She is asking that it be sent in to [facility] in [place].

## 2022-11-28 ENCOUNTER — OFFICE VISIT (OUTPATIENT)
Dept: PSYCHIATRY | Facility: CLINIC | Age: 72
End: 2022-11-28

## 2022-11-28 DIAGNOSIS — F34.1 DYSTHYMIC DISORDER: ICD-10-CM

## 2022-11-28 DIAGNOSIS — F41.1 GAD (GENERALIZED ANXIETY DISORDER): Primary | ICD-10-CM

## 2022-11-28 PROCEDURE — 90837 PSYTX W PT 60 MINUTES: CPT | Performed by: SOCIAL WORKER
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
## 2023-01-04 ENCOUNTER — OFFICE VISIT (OUTPATIENT)
Dept: PSYCHIATRY | Facility: CLINIC | Age: 73
End: 2023-01-04
Payer: MEDICARE

## 2023-01-04 DIAGNOSIS — F41.1 GAD (GENERALIZED ANXIETY DISORDER): Primary | ICD-10-CM

## 2023-01-04 DIAGNOSIS — F34.1 DYSTHYMIC DISORDER: ICD-10-CM

## 2023-01-04 DIAGNOSIS — F51.01 PRIMARY INSOMNIA: ICD-10-CM

## 2023-01-04 PROCEDURE — 90837 PSYTX W PT 60 MINUTES: CPT | Performed by: SOCIAL WORKER
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
## 2023-01-05 RX ORDER — ESZOPICLONE 3 MG/1
3 TABLET, FILM COATED ORAL NIGHTLY PRN
Qty: 30 TABLET | Refills: 0 | Status: SHIPPED | OUTPATIENT
Start: 2023-01-05 | End: 2023-02-02 | Stop reason: SDUPTHER

## 2023-02-01 ENCOUNTER — OFFICE VISIT (OUTPATIENT)
Dept: PSYCHIATRY | Facility: CLINIC | Age: 73
End: 2023-02-01
Payer: MEDICARE

## 2023-02-01 DIAGNOSIS — F33.1 MAJOR DEPRESSIVE DISORDER, RECURRENT EPISODE, MODERATE: ICD-10-CM

## 2023-02-01 DIAGNOSIS — F41.1 GENERALIZED ANXIETY DISORDER: Primary | ICD-10-CM

## 2023-02-01 PROCEDURE — 90837 PSYTX W PT 60 MINUTES: CPT | Performed by: SOCIAL WORKER

## 2023-02-01 NOTE — PROGRESS NOTES
Date: February 1, 2023  Time In: 12:02 pm  Time Out:12:57 pm    PROGRESS NOTE    Chief Complaint: anxiety, depression    Data:Maggie is a 73 y.o., female who presents for individual therapy session at [facility]. Patient on time, clean and casually dressed  without evidence of intoxication, withdrawal, or perceptual disturbance.  Shares recent concerns and worries  Regarding daughter and granddaughters. Patient questions what would be most helpful.  Patient discussed learning more about ADHD.  continues to drink and be a challenge for the patient especially around financial matters.    Depression Low mood for > 2 weeks, Feelings of guilt/worthlessness, Low energy and Impaired concentration  Generalized Anxiety  Excess Worry, Restless/Edgy, Easily fatigued, Muscle tension and Decreased concentration. Onset of symptoms was vague.  Symptoms are associated with financial burdens and lack of support.  Symptoms are aggravated by anxiety, lonely and stress.   Symptoms improve with medication management, therapy, support groups and personal self-care (wellness) Current rates severity of symptoms, on a scale of 1-10 (10 is the most severe) 6 Context Family and social history was reviewed  Quality been intermittent without a consistent pattern.    Clinical Maneuvering/Intervention:    Assisted patient in processing above session content; acknowledged and normalized patient’s thoughts, feelings, and concerns.Processing concerns about family especially grandchildren.  Processing patient's own feelings and assisting her to apply the knowledge she has learned when interacting with family.  Provided sample responses to emotionally reactive grandchild brief psycho education.  Continuing to explore with the patient social outlets that she might enjoy with patient sharing current plan which is definite progress for her.  Praised patient for continuing to attend [facility] despite continuing to feel  anxious.  Using CBT and parts of mindfulness as treatment modality to address patient's concerns.  Patient was encouraged to participate as she is able and outside activities and things that she enjoys.  Reviewed triggers to worsening of mood and things she can do to minimize the impact Allowed patient to freely discuss issues without interruption or judgment. Provided safe, confidential environment to facilitate the development of positive therapeutic relationship and encourage open, honest communication. Assisted patient in identifying risk factors which would indicate the need for higher level of care including thoughts to harm self or others and/or self-harming behavior and encouraged patient to contact this office, call 911, or present to the nearest emergency room should any of these events occur. Discussed crisis intervention services and means to access. Patient adamantly and convincingly denies current suicidal or homicidal ideation or perceptual disturbance.    Assessment     Psychiatric/Behavioral: Negative for agitation, behavioral problems, decreased concentration, dysphoric mood, hallucinations, self-injury, sleep disturbance, suicidal ideas, negative for hyperactivity. Positive for stress. The patient is nervous/anxious.        Patient appears to maintain relative stability as compared to their baseline. However, patient continues to struggle with anxiety and depression which continues to cause impairment in important areas of functioning. A result, they can be reasonably expected to continue to benefit from treatment and would likely be at increased risk for decompensation otherwise.    Mental Status Exam:   Hygiene:   good  Cooperation:  Cooperative  Eye Contact:  Good  Psychomotor Behavior:  Appropriate  Affect:  Full range and Appropriate  Mood: normal and euthymic  Speech:  Normal  Thought Process:  Goal directed and Linear  Thought Content:  Normal and Mood congruent  Suicidal:  None  Homicidal:   None  Hallucinations:  None  Delusion:  None  Memory:  Intact  Orientation:  Person, Place, Time and Situation  Reliability:  good  Insight:  Good  Judgement:  Good  Impulse Control:  Good and Fair  Physical/Medical Issues:  Yes HTN, high cholesterol, GERD Migraines    Patient's Support Network Includes:  daughter and extended family    Functional Status: Moderate impairment   STRENGTHS: Motivated for treatment, Literate and Articulate  Overall: WNL     WEAKNESSES: Poor social support and Poor coping skills  Progress toward goal: Not at goal    Prognosis: Fair with Ongoing Treatment     Plan     PROGRESS TOWARD CURRENT PLAN OF CARE/TREATMENT PLAN:  Making Progress    SHORT-TERM GOALS: The patient will  will express feelings to therapist each contact , will learn and practice at least 2 anxiety management techniques with goal of decreasing anxiety, will learn and practice at least 2 depression management techniques with goal of decreasing depression, will work with therapist to help expose and extinguish irrational beliefs and conclusions that contribute to anxiety/depression, will work with therapist to identify conflicts from the past and the present that form the basis, will engage in one self-care activity daily, per self-report and will engage in one enjoyable activity daily, per self-report     LONG-TERM GOALS: With the help of therapy, I would like to:     Plan   Crisis Plan:  Symptoms and/or behaviors to indicate a crisis: Difficulty perceiving reality  and Thinking about suicide    What calming techniques or other strategies will patient use to de-esclate and stay safe: slow down, breathe, visualize calming self, think it though, listen to music, change focus, take a walk  Who is one person patient can contact to assist with de-escalation? Daughter    Crisis Management: the Patient will contact staff or crisis line if symptoms exacerbate or if harm to self or others becomes a concern. Crisis resources  include: Crisis Line [phone], 911, Local Law Enforcement, [place], [facility] 24/7 Emergency Room [phone].    Recommended Referrals: Psychiatrist/APRN and Medical Provider (PCP)  Patient will adhere to medication regimen as prescribed and report any side effects. Patient will contact this office, call 911 or present to the nearest emergency room should suicidal or homicidal ideations occur. Provide Cognitive Behavioral Therapy and Solution Focused Therapy to improve functioning, maintain stability, and avoid decompensation and the need for higher level of care.   Patient will continue in individual outpatient therapy with focus on improved functioning and coping skills, maintaining stability, and avoiding decompensation and the need for higher level of care.      Return in about 2-4 weeks, or earlier if symptoms worsen or fail to improve.    VISIT DIAGNOSIS:     ICD-10-CM ICD-9-CM   1. Generalized anxiety disorder  F41.1 300.02   2. Major depressive disorder, recurrent episode, moderate (HCC)  F33.1 296.32    R/O ADHD    Future Appointments       Provider Department Center    2/2/2023 12:30 PM Haim Hill MD [facility]    2/15/2023 12:00 PM Yumiko Alejandre LCSW [facility]    3/15/2023 1:00 PM Yumiko Alejandre LCSW [facility]    3/27/2023 1:30 PM Daniel Ziegler MD [facility] OBGYN [address] [place]    3/29/2023 1:00 PM Yumiko Alejandre LCSW [facility]    4/12/2023 1:00 PM Yumiko Alejandre LCSW [facility]    4/26/2023 1:00 PM Yumiko Alejandre LCSW [facility]            This document has been electronically signed by Yumiko Miles LCSW, [facility]   February 1, 2023 12:05  EST      Part of this note may be an electronic transcription/translation of spoken language to printed text using the Dragon Dictation System.

## 2023-02-02 ENCOUNTER — OFFICE VISIT (OUTPATIENT)
Dept: PSYCHIATRY | Facility: CLINIC | Age: 73
End: 2023-02-02
Payer: MEDICARE

## 2023-02-02 VITALS
SYSTOLIC BLOOD PRESSURE: 120 MMHG | HEART RATE: 56 BPM | DIASTOLIC BLOOD PRESSURE: 72 MMHG | BODY MASS INDEX: 33.99 KG/M2 | WEIGHT: 180 LBS | HEIGHT: 61 IN

## 2023-02-02 DIAGNOSIS — F34.1 DYSTHYMIC DISORDER: Primary | ICD-10-CM

## 2023-02-02 DIAGNOSIS — F41.1 GAD (GENERALIZED ANXIETY DISORDER): ICD-10-CM

## 2023-02-02 DIAGNOSIS — F51.01 PRIMARY INSOMNIA: ICD-10-CM

## 2023-02-02 PROCEDURE — 99214 OFFICE O/P EST MOD 30 MIN: CPT | Performed by: PSYCHIATRY & NEUROLOGY

## 2023-02-02 RX ORDER — DILTIAZEM HYDROCHLORIDE 300 MG/1
CAPSULE, EXTENDED RELEASE ORAL
COMMUNITY

## 2023-02-02 RX ORDER — SOLIFENACIN SUCCINATE 5 MG/1
TABLET, FILM COATED ORAL EVERY 24 HOURS
COMMUNITY

## 2023-02-02 RX ORDER — DULOXETIN HYDROCHLORIDE 60 MG/1
60 CAPSULE, DELAYED RELEASE ORAL DAILY
Qty: 90 CAPSULE | Refills: 1 | Status: SHIPPED | OUTPATIENT
Start: 2023-02-02

## 2023-02-02 RX ORDER — ESZOPICLONE 3 MG/1
3 TABLET, FILM COATED ORAL NIGHTLY PRN
Qty: 30 TABLET | Refills: 5 | Status: SHIPPED | OUTPATIENT
Start: 2023-02-02 | End: 2024-02-02

## 2023-02-02 NOTE — PROGRESS NOTES
"Patient ID: Maggie Emery is a 73 y.o. female    SERVICE TYPE: EVALUATION AND MANAGEMENT (greater than 50% of the time spent for supportive psychotherapy).      /72   Pulse 56   Ht 156.2 cm (61.5\")   Wt 81.6 kg (180 lb)   BMI 33.46 kg/m²     ALLERGIES:  Ambien [zolpidem tartrate], Darvon [propoxyphene], Hydrocodone, and Ibuprofen    CC/ Focus of the visit: depression    HPI: \"He's just meaner\" referring to her alcoholic .  [facility] is [facility]. \"Trying to keep myself happy all understanding he's not going to get any better, worse\". Staying away from her /home as much as she reasonably can and spending time with her daughter and her family, Now involved with their problems (especially an 9 y/o G daughter). No clinically significant episode of major depression, patient seems at her baseline level of functioning and dysthymia.  Continues to work with her therapist with weekly sessions..  Sleeping no changed.  No indication of prescription misuse or substance abuse.    PFSH: Patient has taken ups sewing as a coping strategy, home life fraught with conflict.    Review of Systems  No cardiopulmonary, GI or neurological complaints.    SUPPORTIVE PSYCHOTHERAPY: continuing efforts to promote the therapeutic alliance, address the patient’s issues, and strengthen self awareness, insights, and positive coping skills such as Exercising, listen to music, spending time in nature and utilizing resources.     Mental Status Exam  Appearance:  appropriate  Attitude toward clinician:  cooperative and agreeable   Speech:    Rate:  regular rate and rhythm   Volume: normal  Motor:  no abnormal movements   Mood:  Good  Affect:  euthymic  Thought Processes:  linear, logical, and goal directed  Thought Content:  Normal   Feeling Hopeless: absent  Suicidal Thoughts or Intent:  absent  Homicidal Thoughts:  absent  Perceptual Disturbance: no perceptual disturbance  Attention and Concentration:  " good  Insight and Judgement:  good  Memory:  memory appears to be intact    LABS: No results found for this or any previous visit (from the past 168 hour(s)).    MEDICATION ISSUES: Have discussed with the patient the medications Risks, Benefits, and Side effects including potential falls, possible impaired driving and  metabolic adversities among others. No medication side effects or related complaints today.     TREATMENT PLAN/GOALS: Continue supportive psychotherapy efforts and medications as indicated.     Current Outpatient Medications   Medication Sig Dispense Refill   • atenolol (TENORMIN) 50 MG tablet Take 50 mg by mouth Daily.     • dilTIAZem (TIAZAC) 300 MG 24 hr capsule diltiazem  mg capsule,24 hr,extended release   TAKE ONE CAPSULE BY MOUTH DAILY     • dilTIAZem CD (CARDIZEM CD) 300 MG 24 hr capsule Take 300 mg by mouth Daily.     • DULoxetine (CYMBALTA) 60 MG capsule Take 1 capsule by mouth Daily. 90 capsule 1   • eszopiclone (Lunesta) 3 MG tablet Take 1 tablet by mouth At Night As Needed for Sleep. Take immediately before bedtime 30 tablet 5   • fluticasone (FLONASE) 50 MCG/ACT nasal spray Flonase Allergy Relief 50 mcg/actuation nasal spray,suspension   2 sprays each nostril     • lansoprazole (PREVACID) 30 MG capsule Take 30 mg by mouth Daily.     • losartan-hydrochlorothiazide (HYZAAR) 100-25 MG per tablet Take 1 tablet by mouth Daily.     • Multiple Vitamins-Minerals (Systane ICaps AREDS2) capsule ICaps AREDS2     • Potassium Chloride (KCL-20 PO) Take 20 tablets by mouth Daily.     • pravastatin (PRAVACHOL) 80 MG tablet Daily.     • rizatriptan MLT (MAXALT-MLT) 5 MG disintegrating tablet Take 5 mg by mouth 1 (One) Time As Needed for Migraine. May repeat in 2 hours if needed     • solifenacin (VESICARE) 5 MG tablet Daily.       No current facility-administered medications for this visit.       COLLATERAL PSYCHOTHERAPEUTIC INTERVENTION: continuing with therapist Yumiko Palencia LCSW.     RISK:   moderate    Assessment & Plan     Diagnoses and all orders for this visit:    1. Dysthymic disorder (Primary)  -     DULoxetine (CYMBALTA) 60 MG capsule; Take 1 capsule by mouth Daily.  Dispense: 90 capsule; Refill: 1    2. MIRIAM (generalized anxiety disorder)  -     DULoxetine (CYMBALTA) 60 MG capsule; Take 1 capsule by mouth Daily.  Dispense: 90 capsule; Refill: 1    3. Primary insomnia  -     eszopiclone (Lunesta) 3 MG tablet; Take 1 tablet by mouth At Night As Needed for Sleep. Take immediately before bedtime  Dispense: 30 tablet; Refill: 5        Return in about 6 months (around 8/2/2023).           Patient knows to call if symptoms worsen or fail to improve between appointments.    I spent 30 minutes caring for Maggie on this date of service. This time includes time spent by me in the following activities: Patient evaluation, support psychotherapy, decisions, medications, and documentation.     Dictated utilizing Zenedy dictation    MAKSIM Hill MD

## 2023-03-15 ENCOUNTER — OFFICE VISIT (OUTPATIENT)
Dept: PSYCHIATRY | Facility: CLINIC | Age: 73
End: 2023-03-15
Payer: MEDICARE

## 2023-03-15 DIAGNOSIS — F41.1 GAD (GENERALIZED ANXIETY DISORDER): Primary | ICD-10-CM

## 2023-03-15 DIAGNOSIS — F34.1 DYSTHYMIC DISORDER: ICD-10-CM

## 2023-03-15 PROCEDURE — 90837 PSYTX W PT 60 MINUTES: CPT | Performed by: SOCIAL WORKER
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
## 2023-03-15 NOTE — PROGRESS NOTES
"Date: March 15, 2023  Time In: 1:06 pm  Time Out: 2:00pm    PROGRESS NOTE    Chief Complaint: anxiety and depression    Data:Maggie is a 73 y.o., female who presents for individual therapy session at [facility]. Patient on time, clean and casually dressed  without evidence of intoxication, withdrawal, or perceptual disturbance.   Patient shares ongoing stress with  who has been particularly challenging with anything that patient does in the kitchen. Most lately  wants her to only wash the dishes in the left sink which is \"his sink\". Patient shares that she is a not good . Last week  put all the utensils in the sink and washed them 3 times and each time he put them all in the sink as dirty.   took an entire shelf of things that were antiques and he threw them out. He has his own bowl and utensils because patient doesn't \"wash the right away\".  Patient shares disbelief that  does these things and looks for a reason when she is unable to explain his behavior.  Acknowledged that things are pretty awful a lot of passive aggressive behaviors. Shares that she hasn't noticed as much depression just some sadness. Able to attend some of the basketball games during high school tournaments. Patient shares that the anxiety has remained.  Patient went with her daughter /granddaughters to a baby shower and saw her other daughter too. Has been watching the granddaughters and is enjoying this Son in law is traveling a lot.  Has a new great grandchild who is healthy but does not have a lot of contact with son daughter in law etc-they don't talk as much.    Generalized Anxiety  Excess Worry, Restless/Edgy, Easily fatigued, Muscle tension and Decreased concentration. Onset of symptoms was vague.  Symptoms are associated with relationship problem with unchanged since last visit, financial burdens and lack of support.  Symptoms are aggravated by anxiety, sadness and stress.  "  Symptoms improve with medication management, therapy and personal self-care (wellness) Current rates severity of symptoms, on a scale of 1-10 (10 is the most severe) 6 Context Family and social history was reviewed and is unchanged  Quality improved.    Clinical Maneuvering/Intervention:    Assisted patient in processing above session content; acknowledged and normalized patient’s thoughts, feelings, and concerns. Processing the above session content with the patient-validating the patients feelings. Praised the patient for the insights and new behaviors she is able to do.  The patient identifies feeling powerless with current situation. Working on acceptance of how things are in her life.  Patient shares that she is trying to not react to his meanness and is owning her own mistakes.  Discussing communication skills and ways to respond to children/granddaughters.    Therapist applied CBT/REBT, Communication Skills, Exploration of Coping Skills, Positive Coping Skills and Relaxation/Deep Breathing and encouraged the patient to use positive coping skills such as Exercising, Drawing/Art, Spending time in nature, Meditation/Practice yoga, Reframe the way you are thinking about the problem, Use progressive muscle relaxation, Self Care (Take care of your body in a way that makes you feel good - paint your nails, do your hair, put on a face mask), Establish healthy boundaries , Use positive self-talk, Keep a positive attitude, Take deep breaths, Keep calm by thinking and Utilize resources/coping skills.  Allowed patient to freely discuss issues without interruption or judgment. Provided safe, confidential environment to facilitate the development of positive therapeutic relationship and encourage open, honest communication. Assisted patient in identifying risk factors which would indicate the need for higher level of care including thoughts to harm self or others and/or self-harming behavior and encouraged patient to  contact this office, call 911, or present to the nearest emergency room should any of these events occur. Discussed crisis intervention services and means to access. Patient adamantly and convincingly denies current suicidal or homicidal ideation or perceptual disturbance.    Assessment     Psychiatric/Behavioral: Negative for agitation, behavioral problems, dysphoric mood, hallucinations, self-injury, sleep disturbance, suicidal ideas, negative for hyperactivity. Positive for stress. The patient is nervous/anxious.      Patient appears to maintain relative stability as compared to their baseline. However, patient continues to struggle with anxiety which continues to cause impairment in important areas of functioning. A result, they can be reasonably expected to continue to benefit from treatment and would likely be at increased risk for decompensation otherwise.    Mental Status Exam:   Hygiene:   good  Cooperation:  Cooperative  Eye Contact:  Good  Psychomotor Behavior:  Appropriate  Affect:  Full range and Appropriate  Mood: anxious  Speech:  Normal  Thought Process:  Goal directed and Linear  Thought Content:  Normal and Mood congruent  Suicidal:  None  Homicidal:  None  Hallucinations:  None  Delusion:  None  Memory:  Intact  Orientation:  Person, Place, Time and Situation  Reliability:  good  Insight:  Good  Judgement:  Good  Impulse Control:  Fair  Physical/Medical Issues:  no acute medical concerns     Patient's Support Network Includes:  daughter and extended family    Functional Status: Moderate impairment   STRENGTHS: Motivated for treatment, Literate, Good family support and Articulate  Overall: Anxious     WEAKNESSES: Poor social support and Poor coping skills  Progress toward goal: Not at goal    Prognosis: Fair with Ongoing Treatment        Plan     PROGRESS TOWARD CURRENT PLAN OF CARE/TREATMENT PLAN:  Making Progress    SHORT-TERM GOALS: The patient will  will express feelings to therapist each  contact , will learn and practice at least 2 anxiety management techniques with goal of decreasing anxiety, will work with therapist to help expose and extinguish irrational beliefs and conclusions that contribute to anxiety/depression, will work with therapist to identify conflicts from the past and the present that form the basis, will engage in one self-care activity daily, per self-report and will engage in one enjoyable activity daily, per self-report     LONG-TERM GOALS: With the help of therapy, I would like to:     Plan   Crisis Plan:  Symptoms and/or behaviors to indicate a crisis: Prolonged irritability or anger and Thinking about suicide    What calming techniques or other strategies will patient use to de-esclate and stay safe: slow down, breathe, visualize calming self, think it though, listen to music, change focus, take a walk  Who is one person patient can contact to assist with de-escalation? Daughter    Crisis Management: the Patient will contact staff or crisis line if symptoms exacerbate or if harm to self or others becomes a concern. Crisis resources include: Crisis Line [phone], 911, Local Law Enforcement, [facility], [facility] 24/7 Emergency Room [phone].    Recommended Referrals: Psychiatrist/APRN and Medical Provider (PCP)  Patient will adhere to medication regimen as prescribed and report any side effects. Patient will contact this office, call [phone] or present to the nearest emergency room should suicidal or homicidal ideations occur. Provide Cognitive Behavioral Therapy and Solution Focused Therapy to improve functioning, maintain stability, and avoid decompensation and the need for higher level of care.   Patient will continue in individual outpatient therapy with focus on improved functioning and coping skills, maintaining stability, and avoiding decompensation and the need for higher level of care.    Return in about 2-4 weeks, or earlier if symptoms worsen or fail to  improve.    VISIT DIAGNOSIS:     ICD-10-CM ICD-9-CM   1. MIRIAM (generalized anxiety disorder)  F41.1 300.02   2. Dysthymic disorder  F34.1 300.4        Future Appointments       Provider Department Center    3/27/2023 1:30 PM Daniel Ziegler MD [facility] OBGYN [address] [place]    3/29/2023 1:00 PM Yumiko Alejandre LCSW [facility]    4/12/2023 1:00 PM Yumiko Alejandre LCSW [facility]    4/26/2023 1:00 PM Yumiko Alejandre LCSW [facility]    8/3/2023 12:30 PM Haim Hill MD [facility]            This document has been electronically signed by Yumiko Miles LCSW, [facility]   March 15, 2023 13:12 EDT      Part of this note may be an electronic transcription/translation of spoken language to printed text using the Dragon Dictation System.

## 2023-03-27 ENCOUNTER — OFFICE VISIT (OUTPATIENT)
Dept: OBSTETRICS AND GYNECOLOGY | Facility: CLINIC | Age: 73
End: 2023-03-27
Payer: MEDICARE

## 2023-03-27 VITALS — WEIGHT: 181 LBS | HEIGHT: 62 IN | BODY MASS INDEX: 33.31 KG/M2

## 2023-03-27 DIAGNOSIS — Z53.21 PATIENT LEFT WITHOUT BEING SEEN: Primary | ICD-10-CM

## 2023-03-28 NOTE — PROGRESS NOTES
This record appeared in my open encounter folder. Either the patient canceled before coming or LWBS due to my having an emergency surgery. I really do not know.

## 2023-04-12 ENCOUNTER — OFFICE VISIT (OUTPATIENT)
Dept: PSYCHIATRY | Facility: CLINIC | Age: 73
End: 2023-04-12
Payer: MEDICARE

## 2023-04-12 DIAGNOSIS — F34.1 DYSTHYMIC DISORDER: ICD-10-CM

## 2023-04-12 DIAGNOSIS — F41.1 GAD (GENERALIZED ANXIETY DISORDER): ICD-10-CM

## 2023-04-12 DIAGNOSIS — F33.1 MAJOR DEPRESSIVE DISORDER, RECURRENT EPISODE, MODERATE: Primary | ICD-10-CM

## 2023-04-12 PROCEDURE — 1159F MED LIST DOCD IN RCRD: CPT | Performed by: SOCIAL WORKER

## 2023-04-12 PROCEDURE — 90837 PSYTX W PT 60 MINUTES: CPT | Performed by: SOCIAL WORKER

## 2023-04-12 PROCEDURE — 1160F RVW MEDS BY RX/DR IN RCRD: CPT | Performed by: SOCIAL WORKER

## 2023-04-12 NOTE — PROGRESS NOTES
Date: April 12, 2023  Time In: 1:00 pm  Time Out: 1:55 pm    PROGRESS NOTE    Chief Complaint: anxiety and depression    Data:Maggie is a 73 y.o., female who presents for individual therapy session at [facility]. Patient on time, clean and casually dressed  without evidence of intoxication, withdrawal, or perceptual disturbance.   Shares recent happenings and stressors.  Shares stress from traveling to grandsons  graduation this year. Daughter still working 3 jobs and has little time to do other things. The party will be on Saturday and will travel with daughter from [place].  Attended 7 birthdays in the month or so.  Financial hardships continue.  Has plans to send the gifts she made.  Granddaughter Esperanza is doing better and may stop the counseling soon which patient thinks may be too soon.  Medication has been helpful.  Patient has noticed more anxious and very irritable than normal. Continues to have trouble with focus, attention, avoiding mental tasks, irritable mood at times. Discussed the shootings and the anxiety it produces, neighbors recurrence of cancer. Depression Low mood for > 2 weeks, Decreased/Increased sleep, Feelings of guilt/worthlessness, Low energy and Change in appetite  Generalized Anxiety  Excess Worry, Restless/Edgy, Easily fatigued, Muscle tension and Decreased concentration  depression-low mood, low energy. Onset of symptoms was vague.  Symptoms are associated with relationship problem with unchanged since last visit, financial burdens, chronic pain/pain management and lack of support.  Symptoms are aggravated by anxiety, lonely, sadness, stress and weight management.   Symptoms improve with medication management, therapy, support groups and personal self-care (wellness) Current rates severity of symptoms, on a scale of 1-10 (10 is the most severe) 6 Context Family and social history was reviewed  Quality been intermittent without a consistent pattern.    Clinical  Maneuvering/Intervention:    Assisted patient in processing above session content; acknowledged and normalized patient’s thoughts, feelings, and concerns.   Allowed patient to freely discuss issues without interruption or judgment. Provided safe, confidential environment to facilitate the development of positive therapeutic relationship and encourage open, honest communication.  Processing increased stress with 7 birthdays in the last month or so and perhaps this impacts her mood.  Praising patient for walking way when feeling annoyed or irritable.  Strongly encouraged the patient to turn off the news. Discussing attention, focus, avoiding mental tasks. Introduced meta-cognition- identifying thinking about thinking and ways to stop this type of thinking.  Discussed weight gain, the difficulty she is having and the decrease in appetites and what to watch for and when to contact the office.  Patient has PCP appt this week and will rule out underlying medical cause. Therapist applied CBT/REBT, Exploration of Coping Skills, Exploration of Relationship Patterns, Person Centered and Positive Coping Skills and encouraged the patient to use positive coping skills such as Energy redirection, Distraction, Managing hostile feelings, Reframe the way you are thinking about the problem, Establish healthy boundaries , Use positive self-talk, Keep a positive attitude,  Laugh (Do something fun), Take deep breaths, Keep calm by thinking and Utilize resources/coping skills.    . Assisted patient in identifying risk factors which would indicate the need for higher level of care including thoughts to harm self or others and/or self-harming behavior and encouraged patient to contact this office, call 911, or present to the nearest emergency room should any of these events occur. Discussed crisis intervention services and means to access. Patient adamantly and convincingly denies current suicidal or homicidal ideation or perceptual  disturbance.    Assessment     Psychiatric/Behavioral: Negative for agitation, behavioral problems, decreased concentration, dysphoric mood, hallucinations, self-injury, sleep disturbance, suicidal ideas, negative for hyperactivity. Positive for poor concentration and  stress. The patient is nervous/anxious.      Patient appears to maintain relative stability as compared to their baseline. However, patient continues to struggle with anxiety, depression and attention/focus issues which continues to cause impairment in important areas of functioning. A result, they can be reasonably expected to continue to benefit from treatment and would likely be at increased risk for decompensation otherwise.    Mental Status Exam:   Hygiene:   good  Cooperation:  Cooperative  Eye Contact:  Good  Psychomotor Behavior:  Appropriate  Affect:  Full range and Appropriate  Mood: euthymic, anxious and fluctates  Speech:  Normal  Thought Process:  Goal directed and Linear  Thought Content:  Normal and Mood congruent  Suicidal:  None  Homicidal:  None  Hallucinations:  None  Delusion:  None  Memory:  Intact  Orientation:  Person, Place, Time and Situation  Reliability:  good  Insight:  Good and Fair  Judgement:  Good  Impulse Control:  Good and Fair  Physical/Medical Issues:  No  acute medical concern    PHQ-Score Total:  PHQ-9 Total Score: 16  MIRIAM-7 Total Score:  14    Patient's Support Network Includes:  children and extended family  Functional Status: Moderate impairment   STRENGTHS: Motivated for treatment, Literate and Articulate  Overall: Anxious     WEAKNESSES: Poor social support and Poor coping skills  Progress toward goal: Not at goal    Prognosis: Guarded with Ongoing Treatment    Plan     PROGRESS TOWARD CURRENT PLAN OF CARE/TREATMENT PLAN:  Making Progress    SHORT-TERM GOALS: The patient will  will learn and practice at least 2 anxiety management techniques with goal of decreasing anxiety, will learn and practice at least 2  depression management techniques with goal of decreasing depression, will work with therapist to help expose and extinguish irrational beliefs and conclusions that contribute to anxiety/depression, will work with therapist to identify conflicts from the past and the present that form the basis, will engage in one self-care activity daily, per self-report and will engage in one enjoyable activity daily, per self-report   LONG-TERM GOALS: With the help of therapy, I would like to: Have a remission of symptoms with pharmacotherapy, completes mental tasks easily    Plan   Crisis Plan:  Symptoms and/or behaviors to indicate a crisis: Prolonged irritability or anger, Difficulty perceiving reality  and Thinking about suicide    What calming techniques or other strategies will patient use to de-esclate and stay safe: slow down, breathe, visualize calming self, think it though, listen to music, change focus, take a walk  Who is one person patient can contact to assist with de-escalation? Naty    Crisis Management: the Patient will contact staff or crisis line if symptoms exacerbate or if harm to self or others becomes a concern. Crisis resources include: Crisis Line [phone], 911, Local Law Enforcement, [facility], [facility] 24/7 Emergency Room [phone].    Recommended Referrals: Psychiatrist/APRN and Medical Provider (PCP)  Patient will adhere to medication regimen as prescribed and report any side effects. Patient will contact this office, call 911 or present to the nearest emergency room should suicidal or homicidal ideations occur. Provide Cognitive Behavioral Therapy and Solution Focused Therapy to improve functioning, maintain stability, and avoid decompensation and the need for higher level of care.   Patient will continue in individual outpatient therapy with focus on improved functioning and coping skills, maintaining stability, and avoiding decompensation and the need for higher level of care.    Return in  about 3-4 weeks, or earlier if symptoms worsen or fail to improve.  VISIT DIAGNOSIS:     ICD-10-CM ICD-9-CM   1. Major depressive disorder, recurrent episode, moderate  F33.1 296.32   2. MIRIAM (generalized anxiety disorder)  F41.1 300.02   3. Dysthymic disorder  F34.1 300.4        Future Appointments       Provider Department Center    4/26/2023 1:00 PM Yumiko Alejandre LCSW [facility]    5/15/2023 3:00 PM Yumiko Alejandre LCSW [facility]    5/30/2023 2:00 PM Yumiko Alejandre LCSW [facility]    6/13/2023 12:00 PM Yumiko Alejandre LCSW [facility]    8/3/2023 12:30 PM Haim Hill MD [facility]    8/7/2023 10:50 AM Daniel Ziegler MD [facility] OBGYN [address] [place]            This document has been electronically signed by Yumiko Miles LCSW, [facility]   April 12, 2023 12:59 EDT      Part of this note may be an electronic transcription/translation of spoken language to printed text using the Dragon Dictation System.

## 2023-04-26 ENCOUNTER — OFFICE VISIT (OUTPATIENT)
Dept: PSYCHIATRY | Facility: CLINIC | Age: 73
End: 2023-04-26
Payer: MEDICARE

## 2023-04-26 DIAGNOSIS — F33.1 MAJOR DEPRESSIVE DISORDER, RECURRENT EPISODE, MODERATE: ICD-10-CM

## 2023-04-26 DIAGNOSIS — F41.1 GAD (GENERALIZED ANXIETY DISORDER): Primary | ICD-10-CM

## 2023-04-26 PROCEDURE — 90837 PSYTX W PT 60 MINUTES: CPT | Performed by: SOCIAL WORKER

## 2023-04-26 NOTE — PROGRESS NOTES
Date: April 26, 2023  Time In: 12:59 pm  Time Out: 1:54 pm  PROGRESS NOTE    Chief Complaint: anxiety & depression    Data:Maggie is a 73 y.o., female who presents for individual therapy session at [facility]. Patient on time, clean and casually dressed  without evidence of intoxication, withdrawal, or perceptual disturbance.   Patient shares that she is feeling more anxious today but cannot identify a specific stressor.  Patient provides updates about kids, grand kids and the stress around this.  Patient does have financial concerns about a dental bill that  will be difficult about. Expresses feeling lonely that she doesn't have more connection with her children.  Has been able to listen to CR online/facebook which she finds helpful. Depression Low mood for > 2 weeks, Feelings of guilt/worthlessness, Low energy and Impaired concentration  Generalized Anxiety  Excess Worry, Restless/Edgy, Easily fatigued, Muscle tension and Decreased concentration. Onset of symptoms was vague.  Symptoms are associated with relationship problem with unchanged since last visit, financial burdens and lack of support.  Symptoms are aggravated by anxiety, lonely, sadness, stress and weight management.   Symptoms improve with medication management, therapy and support groups Current rates severity of symptoms, on a scale of 1-10 (10 is the most severe) 7 Context Family and social history was reviewed and is unchanged  Quality been intermittent without a consistent pattern.    Clinical Maneuvering/Intervention:  Assisted patient in processing above session content; acknowledged and normalized patient’s thoughts, feelings, and concerns.  Allowed patient to freely discuss issues without interruption or judgment. Continuing to assist the patient to challenge the automatic thoughts that  Provided safe, confidential environment to facilitate the development of positive therapeutic relationship and encourage open,  honest communication.  Validating the patients feelings on loneliness in her marriage. Continuing to assist the patient in processing the above session content.  Therapist applied CBT/REBT, Exploration of Coping Skills, Person Centered and Positive Coping Skills and encouraged the patient to use positive coping skills such as Reframe the way you are thinking about the problem, Self Care (Take care of your body in a way that makes you feel good - paint your nails, do your hair, put on a face mask), Walk away (leave a situation that is causing you stress), Use positive self-talk, Keep a positive attitude, Take deep breaths, Keep calm by thinking, Demonstrate self-control and Utilize resources/coping skills.     Assisted patient in identifying risk factors which would indicate the need for higher level of care including thoughts to harm self or others and/or self-harming behavior and encouraged patient to contact this office, call 911, or present to the nearest emergency room should any of these events occur. Discussed crisis intervention services and means to access. Patient adamantly and convincingly denies current suicidal or homicidal ideation or perceptual disturbance.    Assessment     Psychiatric/Behavioral: Negative for agitation, behavioral problems, decreased concentration, dysphoric mood, hallucinations, self-injury, sleep disturbance, suicidal ideas, negative for hyperactivity. Positive for depressed mood and stress. The patient is nervous/anxious.        Patient appears to maintain relative stability as compared to their baseline. However, patient continues to struggle with anxiety, depression which continues to cause impairment in important areas of functioning. A result, they can be reasonably expected to continue to benefit from treatment and would likely be at increased risk for decompensation otherwise.      Patient's Support Network Includes:  children and extended family    Functional Status:  Moderate impairment   STRENGTHS: Motivated for treatment, Literate and Articulate  Overall: Anxious     WEAKNESSES: Poor social support and Poor coping skills  Progress toward goal: Not at goal    Prognosis: Fair with Ongoing Treatment          Plan     PROGRESS TOWARD CURRENT PLAN OF CARE/TREATMENT PLAN:  Making Progress    SHORT-TERM GOALS: The patient will  will learn and practice at least 2 anxiety management techniques with goal of decreasing anxiety, will learn and practice at least 2 depression management techniques with goal of decreasing depression, will work with therapist to help expose and extinguish irrational beliefs and conclusions that contribute to anxiety/depression, will initiate 2 or more social contacts per week for the next 4 weeks, will engage in one self-care activity daily, per self-report and will engage in one enjoyable activity daily, per self-report     LONG-TERM GOALS: With the help of therapy, I would like to: Remission of symptoms without the need for hospitalization    Plan   Crisis Plan:  Symptoms and/or behaviors to indicate a crisis: Difficulty perceiving reality  and Thinking about suicide    What calming techniques or other strategies will patient use to de-esclate and stay safe: slow down, breathe, visualize calming self, think it though, listen to music, change focus, take a walk  Who is one person patient can contact to assist with de-escalation? Naty    Crisis Management: the Patient will contact staff or crisis line if symptoms exacerbate or if harm to self or others becomes a concern. Crisis resources include: Crisis Line [phone], 911, Local Law Enforcement, [facility], [facility] 24/7 Emergency Room [phone].    Recommended Referrals: Psychiatrist/APRN and Medical Provider (PCP)  Patient will adhere to medication regimen as prescribed and report any side effects. Patient will contact this office, call 911 or present to the nearest emergency room should suicidal or  homicidal ideations occur. Provide Cognitive Behavioral Therapy and Solution Focused Therapy to improve functioning, maintain stability, and avoid decompensation and the need for higher level of care.   Patient will continue in individual outpatient therapy with focus on improved functioning and coping skills, maintaining stability, and avoiding decompensation and the need for higher level of care.      Return in about 4 weeks, or earlier if symptoms worsen or fail to improve.    VISIT DIAGNOSIS:     ICD-10-CM ICD-9-CM   1. Major depressive disorder, recurrent episode, moderate  F33.1 296.32   2. MIRIAM (generalized anxiety disorder)  F41.1 300.02        Future Appointments       Provider Department Center    5/15/2023 3:00 PM Yumiko Alejandre LCSW [facility]    5/30/2023 2:00 PM Yumiko Alejandre LCSW [facility]    6/13/2023 12:00 PM Yumiko Alejandre LCSW [facility]    8/3/2023 12:30 PM Haim Hill MD [facility]    8/7/2023 10:50 AM Daniel Ziegler MD [facility] OBGYN [address] [place]            This document has been electronically signed by Yumiko Miles LCSW [facility]   April 26, 2023 13:02 EDT      Part of this note may be an electronic transcription/translation of spoken language to printed text using the Dragon Dictation System.

## 2023-05-15 ENCOUNTER — OFFICE VISIT (OUTPATIENT)
Dept: PSYCHIATRY | Facility: CLINIC | Age: 73
End: 2023-05-15
Payer: MEDICARE

## 2023-05-15 DIAGNOSIS — F41.1 GAD (GENERALIZED ANXIETY DISORDER): Primary | ICD-10-CM

## 2023-05-15 DIAGNOSIS — F34.1 DYSTHYMIC DISORDER: ICD-10-CM

## 2023-05-15 PROCEDURE — 90837 PSYTX W PT 60 MINUTES: CPT | Performed by: SOCIAL WORKER

## 2023-05-15 NOTE — PROGRESS NOTES
Date of Service: May 15, 2023  Time In: 3:00 pm  Time Out: 3:55 pm    PROGRESS NOTE  Data:The patient is a 73 y.o. person who met 1:1 with Yumiko Miles LCSW, LCADC for regularly scheduled individual session.Verified the patients identity.Maggie presents for session on time, clean and casually dressed  without evidence of intoxication, withdrawal, or perceptual disturbance.   The patient was open and engaged.      Chief Compliant: Patient presents with anxiety and depression  HPI: Data:  Patient has been able to face-time daughter in law and new great granddaughter. Grandson who is graduating will attend [employer] in [place] so that he can play football and study forestry. He has found work putting in pools and is also a  which makes good tips. Graduation is Thursday with party on Saturday and plans to go to both.  Plans to attend party with daughter.  Daughter continues to work 3 jobs and is very busy.  Patient verbalized being sad that her  does not go to these type of events and recognizes that he just isnt capable.  Hasnt been feeling as good as she had been, short of breath, and is following up with a cardiologist to make sure there isnt anything wrong.   The patient shares that she is at the age where her dad was diagnosed with stomach cancer.  Mom   2 weeks after open heart and she never woke up.  Son sent her a gift card for mothers day, Naty bought her some flowers to. Older daughter sent her a cup for hiking.  Depression Low mood for > 2 weeks, Feelings of guilt/worthlessness, Low energy and Impaired concentration  Generalized Anxiety  Excess Worry, Restless/Edgy, Easily fatigued, Muscle tension and Decreased concentration. Onset of symptoms was vague.  Symptoms are associated with relationship problem with unchanged since last visit, financial burdens and lack of support.  Symptoms are aggravated by anxiety, lonely, stress and weight management.   Symptoms  improve with medication management, therapy and personal self-care (wellness) Current rates severity of symptoms, on a scale of 1-10 (10 is the most severe) 6 Context Family and social history was reviewed and is unchanged since last visit.  Quality been intermittent without a consistent pattern.    CLINICAL MANEUVERING/INTERVENTION/SUPPORTIVE PSYCHOTHERAPY: Therapist continued to promote the therapeutic alliance, address the patient’s issues, and strengthen self awareness, insights, and coping skills.  Processing parents death and being of the similar age as they were.  Reviewing healthy life style, eating patterns and ongoing weight gain.  Identified her own knowledge and current behviors which is frustrating, validating patient expressions. Therapist applied CBT/REBT and Exploration of Coping Skills and encouraged the patient to use positive coping skills such as Listen to music, Playing with a pet, Spending time in nature, Reframe the way you are thinking about the problem, Establish healthy boundaries , Use positive self-talk, Keep a positive attitude, Take deep breaths, Keep calm by thinking and Utilize resources/coping skills.  Therapist allowed Maggie  to freely discuss issues without interruption or judgment. Provided safe, confidential environment to facilitate the development of positive therapeutic relationship and encourage open, honest communication. Assisted patient in identifying increased risk factors which would indicate the need for higher level of care including thoughts to harm self or others, self-harming behavior, and/or binge drinking and encouraged patient to contact this office, call 911, or present to the nearest emergency room should any of these events occur. Discussed crisis intervention services and means to access.     Risk Assessment:  [x] No SI/HI, []  passive thoughts []  suicidal ideation , []  homicidal ideation, [] self-harm Explain Risk of self-harm is   low, but could be  further elevated in the event of treatment noncompliance and/or AODA.  Assessment      Psychiatric/Behavioral: Negative for agitation, behavioral problems, decreased concentration, dysphoric mood, hallucinations, self-injury, sleep disturbance, suicidal ideas, negative for hyperactivity. Positive for depressed mood and stress. The patient is nervous/anxious.      MENTAL STATUS EXAM   General Appearance:  Cleanly groomed and dressed  Eye Contact:  Good eye contact  Attitude:  Cooperative  Motor Activity:  Other  Other Comment:  Normal  Speech:  Normal rate, tone, volume  Language:  Spontaneous  Mood and affect:  Normal, pleasant  Hopelessness:  Denies  Thought Process:  Logical and goal-directed  Associations/ Thought Content:  No delusions  Hallucinations:  None  Suicidal Ideations:  Not present  Homicidal Ideation:  Not present  Sensorium:  Alert  Orientation:  Person, place, situation and time  Immediate Recall, Recent, and Remote Memory:  Intact  Attention Span/ Concentration:  Easily distracted  Fund of Knowledge:  Appropriate for age and educational level  Insight:  Good  Judgement:  Good  Reliability:  Good  Impulse Control:  Good        Patient's Support Network Includes:  , children and extended family    Progress toward goal: Not at goal    Functional Status: Moderate impairment     Overall: Anxious     VISIT DIAGNOSIS:     ICD-10-CM ICD-9-CM   1. MIRIAM (generalized anxiety disorder)  F41.1 300.02   2. Dysthymic disorder  F34.1 300.4        PROGNOSIS: fair if patient follows treatment recommendations    The patient appears to be mentally/physically stable compared to his baseline functioning.  However, they continues to present with a severe chronic mental illness. As a result,  they would be at significantly increased risk for decompensation and possibly higher level of care without continued treatment.  It is reasonable to assume they would considerably benefit from ongoing treatment.           PROGRESS TOWARD CURRENT PLAN OF CARE/TREATMENT PLAN:  Making Progress    SHORT-TERM GOALS: The patient will  will learn and practice at least 2 anxiety management techniques with goal of decreasing anxiety, will learn and practice at least 2 depression management techniques with goal of decreasing depression, will work with therapist to help expose and extinguish irrational beliefs and conclusions that contribute to anxiety/depression, will work with therapist to identify conflicts from the past and the present that form the basis, will engage in one self-care activity daily, per self-report and will engage in one enjoyable activity daily, per self-report     LONG-TERM GOALS: With the help of therapy, I would like to:   learn how to relax and take it easy and become calmer and more laid-back  change the way I act around my children  understand more clearly who I am, what I' m capable of, and what I want out of life  figure out what my limits are and how to act accordingly, learn how to pursue my goals and plans more effectively and allow myself to experience feelings and express them more effectively  learn how to cope with negative thoughts, ruminations, or sense of guilt, find a way out of negative mood, sadness, or sense of inner emptiness, learn how to master anxiety or panic attacks, learn how to be among people without acting insecurely (e.g., blushing, stuttering)., learn how to reduce or cope with physical pain, learn how be more organized in daily life and learn how to handle stressful situations better    STRENGTHS: Motivated for treatment, Literate and Articulate    WEAKNESSES: Poor social support and Poor coping skills    Plan   Crisis Plan:  Symptoms and/or behaviors to indicate a crisis: Thinking about suicide    What calming techniques or other strategies will patient use to de-esclate and stay safe: slow down, breathe, visualize calming self, think it though, listen to music, change focus, take a  walk  Who is one person patient can contact to assist with de-escalation? Naty    Crisis Management: the Patient will contact staff or crisis line if symptoms exacerbate or if harm to self or others becomes a concern. Crisis resources include: Crisis Line [phone], 911, Local Law Enforcement, \[facility]\"", [facility] 24/7 Emergency Room [phone].    PLAN:   Will continue in BI-WEEKLY ongoing outpatient treatment via Face-to-Facewith primary therapist and pharmacotherapy as scheduled.   Will  report any adverse reactions to treatment/medication interventions immediately.  Will be compliant with treatment and appointments.   May 15, 2023 15:07 EDT    Recommended Referrals: Psychiatrist/APRN and Medical Provider (PCP)  Patient will adhere to medication regimen as prescribed and report any side effects. Patient will contact this office, call 911 or present to the nearest emergency room should suicidal or homicidal ideations occur. Provide Cognitive Behavioral Therapy and Solution Focused Therapy to improve functioning, maintain stability, and avoid decompensation and the need for higher level of care.          Future Appointments       Provider Department Center    5/30/2023 2:00 PM Yumiko Alejandre LCSW [facility]    6/27/2023 11:00 AM Yumiko Alejandre LCSW [facility]    8/3/2023 12:30 PM Haim Hill MD [facility]    8/7/2023 10:50 AM Daniel Ziegler MD [facility] OBGYN [address] [place]          May 15, 2023 15:07 EDT      Yumiko Miles LCSW, [facility]

## 2023-05-30 ENCOUNTER — OFFICE VISIT (OUTPATIENT)
Dept: PSYCHIATRY | Facility: CLINIC | Age: 73
End: 2023-05-30

## 2023-05-30 DIAGNOSIS — F41.1 GAD (GENERALIZED ANXIETY DISORDER): Primary | ICD-10-CM

## 2023-05-30 DIAGNOSIS — F33.1 MAJOR DEPRESSIVE DISORDER, RECURRENT EPISODE, MODERATE: ICD-10-CM

## 2023-05-30 PROCEDURE — 90837 PSYTX W PT 60 MINUTES: CPT | Performed by: SOCIAL WORKER

## 2023-05-30 NOTE — PROGRESS NOTES
Date: May 30, 2023  Time In: 2:00 pm  Time Out: 2:55 pm  PROGRESS NOTE    Chief Complaint:     Data:Maggie is a 73 y.o., female who presents for individual therapy session at [facility]. Patient on time, clean and casually dressed  without evidence of intoxication, withdrawal, or perceptual disturbance.   Discussed recent happenings with her children and hurt feelings.  Patient saw the cardiologist who let he know that her pulse was 48 and adjusted her medication.  Will do a pulmonary test to see if her lethargy is coming from and if everything checks outs the cardiologist will keep investigating the cause. The patient shares that this has produced some increase in anxiety because she does not want her blood pressure to be high for health-reasons, heart attack/strike. She shares that she has a colonoscopy & EGD scheduled for her 5 year follow up and the desire to rule out anything going on with her stomach since she is the age that her Dad was when he was diagnosed with stomach cancer.Patient identifies that she gets her feelings hurt rather easily and wonders if it is her or her kids. She shares that she can understand her children being busy but 2 of them never call or reach out to her and that is hurtful.   Depression Low mood for > 2 weeks, Loss of Interest, Feelings of guilt/worthlessness, Low energy and Impaired concentration  Generalized Anxiety  Excess Worry, Restless/Edgy, Easily fatigued, Muscle tension and Decreased concentration. Onset of symptoms was vague.  Symptoms are associated with relationship problem with unchanged since last visit, financial burdens, chronic pain/pain management and lack of support.  Symptoms are aggravated by anxiety, lonely, sadness, stress and weight management.   Symptoms improve with medication management, therapy and personal self-care (wellness) Current rates severity of symptoms, on a scale of 1-10 (10 is the most severe) 7 Context Family and social  history was reviewed  Quality been intermittent without a consistent pattern.    Clinical Maneuvering/Intervention:    Assisted patient in processing above session content; acknowledged and normalized patient’s thoughts, feelings, and concerns. Rationalized patient thought process regarding . Discussed triggers associated with patient's anxiety, depression, focus/attention and life stressors. Processing the hurt she feels around some of her families behaviors and her own sensitivity-if that is what it is. Normalized the patients feelings and discuss various responses she might use in situations when children are insensitive. Encouraged patients pursuit of her own hobbies. Therapist applied CBT/REBT, Exploration of Coping Skills, Exploration of Relationship Patterns, Positive Coping Skills and Psycho-Education and encouraged the patient to use positive coping skills such as Exercising, Taking a bath/shower, Releasing pent up emotions, Reframe the way you are thinking about the problem, Use positive self-talk, Keep a positive attitude, Ask for a break, Keep calm by thinking and Utilize resources/coping skills.    Allowed patient to freely discuss issues without interruption or judgment. Provided safe, confidential environment to facilitate the development of positive therapeutic relationship and encourage open, honest communication. Assisted patient in identifying risk factors which would indicate the need for higher level of care including thoughts to harm self or others and/or self-harming behavior and encouraged patient to contact this office, call 911, or present to the nearest emergency room should any of these events occur. Discussed crisis intervention services and means to access. Patient adamantly and convincingly denies current suicidal or homicidal ideation or perceptual disturbance.    Assessment     Psychiatric/Behavioral: Negative for agitation, behavioral problems, decreased concentration, dysphoric  mood, hallucinations, self-injury, sleep disturbance, suicidal ideas, negative for hyperactivity. Positive for depressed mood and stress. The patient is nervous/anxious.        Patient appears to maintain relative stability as compared to their baseline. However, patient continues to struggle with anxiety, depression and attention problems which continues to cause impairment in important areas of functioning. A result, they can be reasonably expected to continue to benefit from treatment and would likely be at increased risk for decompensation otherwise.      Patient's Support Network Includes:  , children and extended family    Functional Status: Moderate impairment   STRENGTHS: Motivated for treatment, Literate and Articulate  Overall: Anxious     WEAKNESSES: Poor social support and Poor coping skills  Progress toward goal: Not at goal    Prognosis: Guarded with Ongoing Treatment    Plan     PROGRESS TOWARD CURRENT PLAN OF CARE/TREATMENT PLAN:  Making Progress  Patient will explore sensitivity.     SHORT-TERM GOALS: The patient will  will express feelings to therapist each contact , will identify the severity of symptoms each contact, will learn and practice at least 2 anxiety management techniques with goal of decreasing anxiety, will learn and practice at least 2 depression management techniques with goal of decreasing depression, will learn and practice at least 2 mood swing management techniques with goal of decreasing mood swings, will work with therapist to help expose and extinguish irrational beliefs and conclusions that contribute to anxiety/depression, will initiate 2 or more social contacts per week for the next 4 weeks, will engage in one self-care activity daily, per self-report and will engage in one enjoyable activity daily, per self-report     LONG-TERM GOALS: With the help of therapy, I would like to: Remission of symptoms of anxiety & depression for 6 months    Plan   Crisis  Plan:  Symptoms and/or behaviors to indicate a crisis: Excessive worry or fear, Prolonged irritability or anger and Thinking about suicide    What calming techniques or other strategies will patient use to de-esclate and stay safe: slow down, breathe, visualize calming self, think it though, listen to music, change focus, take a walk  Who is one person patient can contact to assist with de-escalation? Naty    Crisis Management: the Patient will contact staff or crisis line if symptoms exacerbate or if harm to self or others becomes a concern. Crisis resources include: Crisis Line [phone], 911, Local Law Enforcement, [facility], [facility] 24/7 Emergency Room [phone].    Recommended Referrals: Psychiatrist/APRN and Medical Provider (PCP)  Patient will adhere to medication regimen as prescribed and report any side effects. Patient will contact this office, call 911 or present to the nearest emergency room should suicidal or homicidal ideations occur. Provide Cognitive Behavioral Therapy and Solution Focused Therapy to improve functioning, maintain stability, and avoid decompensation and the need for higher level of care.   Patient will continue in individual outpatient therapy with focus on improved functioning and coping skills, maintaining stability, and avoiding decompensation and the need for higher level of care.    Return in about 3-4 weeks, or earlier if symptoms worsen or fail to improve.    VISIT DIAGNOSIS:     ICD-10-CM ICD-9-CM   1. MIRIAM (generalized anxiety disorder)  F41.1 300.02   2. Major depressive disorder, recurrent episode, moderate  F33.1 296.32    R/O ADHD    Future Appointments       Provider Department Center    6/27/2023 11:00 AM Yumiko Alejandre LCSW [facility]    8/3/2023 12:30 PM Haim Hill MD [facility]    8/7/2023 10:50 AM Daniel Ziegler MD [facility]  OBGYN [address] [place]    8/8/2023 10:00 AM Yumiko Alejandre LCSW [facility]            This document has been electronically signed by Yumiko Miles LCSW [facility]   May 30, 2023 14:07 EDT      Part of this note may be an electronic transcription/translation of spoken language to printed text using the Dragon Dictation System.

## 2023-07-27 DIAGNOSIS — F51.01 PRIMARY INSOMNIA: ICD-10-CM

## 2023-07-27 DIAGNOSIS — F41.1 GAD (GENERALIZED ANXIETY DISORDER): ICD-10-CM

## 2023-07-27 DIAGNOSIS — F34.1 DYSTHYMIC DISORDER: ICD-10-CM

## 2023-07-27 RX ORDER — DULOXETIN HYDROCHLORIDE 60 MG/1
60 CAPSULE, DELAYED RELEASE ORAL DAILY
Qty: 90 CAPSULE | Refills: 1 | Status: SHIPPED | OUTPATIENT
Start: 2023-07-27

## 2023-07-27 RX ORDER — ESZOPICLONE 3 MG/1
3 TABLET, FILM COATED ORAL NIGHTLY PRN
Qty: 30 TABLET | Refills: 0 | Status: SHIPPED | OUTPATIENT
Start: 2023-07-27 | End: 2024-07-26

## 2023-08-02 ENCOUNTER — TRANSCRIBE ORDERS (OUTPATIENT)
Dept: ADMINISTRATIVE | Facility: HOSPITAL | Age: 73
End: 2023-08-02
Payer: MEDICARE

## 2023-08-03 ENCOUNTER — OFFICE VISIT (OUTPATIENT)
Dept: PSYCHIATRY | Facility: CLINIC | Age: 73
End: 2023-08-03
Payer: MEDICARE

## 2023-08-03 VITALS
HEART RATE: 88 BPM | SYSTOLIC BLOOD PRESSURE: 142 MMHG | WEIGHT: 187 LBS | BODY MASS INDEX: 35.3 KG/M2 | DIASTOLIC BLOOD PRESSURE: 80 MMHG | HEIGHT: 61 IN

## 2023-08-03 DIAGNOSIS — F41.1 GAD (GENERALIZED ANXIETY DISORDER): ICD-10-CM

## 2023-08-03 DIAGNOSIS — F51.01 PRIMARY INSOMNIA: ICD-10-CM

## 2023-08-03 DIAGNOSIS — F34.1 DYSTHYMIC DISORDER: ICD-10-CM

## 2023-08-03 PROCEDURE — 3077F SYST BP >= 140 MM HG: CPT | Performed by: PSYCHIATRY & NEUROLOGY

## 2023-08-03 PROCEDURE — 3079F DIAST BP 80-89 MM HG: CPT | Performed by: PSYCHIATRY & NEUROLOGY

## 2023-08-03 PROCEDURE — 99214 OFFICE O/P EST MOD 30 MIN: CPT | Performed by: PSYCHIATRY & NEUROLOGY

## 2023-08-03 RX ORDER — ESZOPICLONE 3 MG/1
3 TABLET, FILM COATED ORAL NIGHTLY PRN
Qty: 30 TABLET | Refills: 4 | Status: SHIPPED | OUTPATIENT
Start: 2023-08-03 | End: 2024-08-02

## 2023-08-03 RX ORDER — HYDRALAZINE HYDROCHLORIDE 25 MG/1
TABLET, FILM COATED ORAL EVERY 12 HOURS SCHEDULED
COMMUNITY
End: 2023-08-07

## 2023-08-07 ENCOUNTER — OFFICE VISIT (OUTPATIENT)
Dept: OBSTETRICS AND GYNECOLOGY | Facility: CLINIC | Age: 73
End: 2023-08-07
Payer: MEDICARE

## 2023-08-07 VITALS
WEIGHT: 186 LBS | BODY MASS INDEX: 34.23 KG/M2 | DIASTOLIC BLOOD PRESSURE: 72 MMHG | SYSTOLIC BLOOD PRESSURE: 130 MMHG | HEIGHT: 62 IN

## 2023-08-07 DIAGNOSIS — E66.9 OBESITY (BMI 30.0-34.9): ICD-10-CM

## 2023-08-07 DIAGNOSIS — Z90.79 HISTORY OF TOTAL ABDOMINAL HYSTERECTOMY AND BILATERAL SALPINGO-OOPHORECTOMY: Primary | ICD-10-CM

## 2023-08-07 DIAGNOSIS — M85.88 OSTEOPENIA OF LUMBAR SPINE: ICD-10-CM

## 2023-08-07 DIAGNOSIS — Z90.710 HISTORY OF TOTAL ABDOMINAL HYSTERECTOMY AND BILATERAL SALPINGO-OOPHORECTOMY: Primary | ICD-10-CM

## 2023-08-07 DIAGNOSIS — Z01.419 WOMEN'S ANNUAL ROUTINE GYNECOLOGICAL EXAMINATION: ICD-10-CM

## 2023-08-07 DIAGNOSIS — Z90.722 HISTORY OF TOTAL ABDOMINAL HYSTERECTOMY AND BILATERAL SALPINGO-OOPHORECTOMY: Primary | ICD-10-CM

## 2023-08-07 NOTE — PROGRESS NOTES
Subjective     Chief Complaint   Patient presents with    Gynecologic Exam     Annual questions a lump under her arm right dime size round pain        Maggie Emery is a 73 y.o. year old  presenting to be seen for her annual exam.      She is not sexually active.      She exercises regularly: yes.  She wears her seat belt: yes.  She has concerns about domestic violence: no.  She has noticed changes in height: not asked  Health Maintenance for Postmenopausal Women  Menopause is a normal process in which your ability to get pregnant comes to an end. This process happens slowly over many months or years, usually between the ages of 48 and 55. Menopause is complete when you have missed your menstrual period for 12 months.  It is important to talk with your health care provider about some of the most common conditions that affect women after menopause (postmenopausal women). These include heart disease, cancer, and bone loss (osteoporosis). Adopting a healthy lifestyle and getting preventive care can help to promote your health and wellness. The actions you take can also lower your chances of developing some of these common conditions.  What are the signs and symptoms of menopause?  During menopause, you may have the following symptoms:  Hot flashes. These can be moderate or severe.  Night sweats.  Decrease in sex drive.  Mood swings.  Headaches.  Tiredness (fatigue).  Irritability.  Memory problems.  Problems falling asleep or staying asleep.  Talk with your health care provider about treatment options for your symptoms.  Do I need hormone replacement therapy?  Hormone replacement therapy is effective in treating symptoms that are caused by menopause, such as hot flashes and night sweats.  Hormone replacement carries certain risks, especially as you become older. If you are thinking about using estrogen or estrogen with progestin, discuss the benefits and risks with your health care provider.  How can I reduce  my risk for heart disease and stroke?  The risk of heart disease, heart attack, and stroke increases as you age. One of the causes may be a change in the body's hormones during menopause. This can affect how your body uses dietary fats, triglycerides, and cholesterol. Heart attack and stroke are medical emergencies. There are many things that you can do to help prevent heart disease and stroke.  Watch your blood pressure  High blood pressure causes heart disease and increases the risk of stroke. This is more likely to develop in people who have high blood pressure readings or are overweight.  Have your blood pressure checked:  Every 3-5 years if you are 18-39 years of age.  Every year if you are 40 years old or older.  Eat a healthy diet    Eat a diet that includes plenty of vegetables, fruits, low-fat dairy products, and lean protein.  Do not eat a lot of foods that are high in solid fats, added sugars, or sodium.  Get regular exercise  Get regular exercise. This is one of the most important things you can do for your health. Most adults should:  Try to exercise for at least 150 minutes each week. The exercise should increase your heart rate and make you sweat (moderate-intensity exercise).  Try to do strengthening exercises at least twice each week. Do these in addition to the moderate-intensity exercise.  Spend less time sitting. Even light physical activity can be beneficial.  Other tips  Work with your health care provider to achieve or maintain a healthy weight.  Do not use any products that contain nicotine or tobacco. These products include cigarettes, chewing tobacco, and vaping devices, such as e-cigarettes. If you need help quitting, ask your health care provider.  Know your numbers. Ask your health care provider to check your cholesterol and your blood sugar (glucose). Continue to have your blood tested as directed by your health care provider.  Do I need screening for cancer?  Depending on your health  history and family history, you may need to have cancer screenings at different stages of your life. This may include screening for:  Breast cancer.  Cervical cancer.  Lung cancer.  Colorectal cancer.  What is my risk for osteoporosis?  After menopause, you may be at increased risk for osteoporosis. Osteoporosis is a condition in which bone destruction happens more quickly than new bone creation. To help prevent osteoporosis or the bone fractures that can happen because of osteoporosis, you may take the following actions:  If you are 19-50 years old, get at least 1,000 mg of calcium and at least 600 international units (IU) of vitamin D per day.  If you are older than age 50 but younger than age 70, get at least 1,200 mg of calcium and at least 600 international units (IU) of vitamin D per day.  If you are older than age 70, get at least 1,200 mg of calcium and at least 800 international units (IU) of vitamin D per day.  Smoking and drinking excessive alcohol increase the risk of osteoporosis. Eat foods that are rich in calcium and vitamin D, and do weight-bearing exercises several times each week as directed by your health care provider.  How does menopause affect my mental health?  Depression may occur at any age, but it is more common as you become older. Common symptoms of depression include:  Feeling depressed.  Changes in sleep patterns.  Changes in appetite or eating patterns.  Feeling an overall lack of motivation or enjoyment of activities that you previously enjoyed.  Frequent crying spells.  Talk with your health care provider if you think that you are experiencing any of these symptoms.  General instructions  See your health care provider for regular wellness exams and vaccines. This may include:  Scheduling regular health, dental, and eye exams.  Getting and maintaining your vaccines. These include:  Influenza vaccine. Get this vaccine each year before the flu season begins.  Pneumonia  "vaccine.  Shingles vaccine.  Tetanus, diphtheria, and pertussis (Tdap) booster vaccine.  Your health care provider may also recommend other immunizations.  Tell your health care provider if you have ever been abused or do not feel safe at home.  Summary  Menopause is a normal process in which your ability to get pregnant comes to an end.  This condition causes hot flashes, night sweats, decreased interest in sex, mood swings, headaches, or lack of sleep.  Treatment for this condition may include hormone replacement therapy.  Take actions to keep yourself healthy, including exercising regularly, eating a healthy diet, watching your weight, and checking your blood pressure and blood sugar levels.  Get screened for cancer and depression. Make sure that you are up to date with all your vaccines.  GYN screening history:  Last pap: she reports her last PAP was normal  Last mammogram: she reports her last mammogram was normal  Last colonoscopy: she reports her last colonoscopy was normal  Last DEXA: 2020 osteopenia.    No Additional Complaints Reported    The following portions of the patient's history were reviewed and updated as appropriate:vital signs, allergies, current medications, past medical history, past social history, past surgical history, and problem list.    Review of Systems  Constitutional: positive for fatigue     Physical Exam    Objective     /72   Ht 156.2 cm (61.5\")   Wt 84.4 kg (186 lb)   Breastfeeding No   BMI 34.58 kg/mý     General:  well developed; well nourished  no acute distress  obese - Body mass index is 34.58 kg/mý.   Constitutional: obese and healthy   Skin:  No suspicious lesions seen   Thyroid: normal to inspection and palpation   Lungs:  breathing is unlabored  rales bibasilar   Heart:  regular rate and rhythm, S1, S2 normal, no murmur, click, rub or gallop   Breasts:  Examined in supine position  Symmetric without masses or skin dimpling  Nipples normal without inversion, " lesions or discharge  There are no palpable axillary nodes   Abdomen: soft, non-tender; no masses  no umbilical or inginual hernias are present  no hepato-splenomegaly   Pelvis: Exam limited by  body habitus  Clinical staff was present for exam  External genitalia:  normal appearance of the external genitalia including Bartholin's and [place]'s glands.  :  urethral meatus normal; urethral hypermobility is absent.  Vaginal:  normal pink mucosa without prolapse or lesions.  Cervix:  absent  Uterus:  absent  Adnexa:  normal bimanual exam of the adnexa.   Musculoskeletal: negative   Neuro: normal without focal findings, mental status, speech normal, alert and oriented x3, and NOAH   Psych: oriented to time, place and person, mood and affect are within normal limits, pt is a good historian; no memory problems were noted       Lab Review   PAP    Imaging  DEXA  Mammogram report    Assessment & Plan     ASSESSMENT  1. History of total abdominal hysterectomy and bilateral salpingo-oophorectomy    2. Women's annual routine gynecological examination    3. Osteopenia of lumbar spine    4. Obesity (BMI 30.0-34.9)        PLAN  Orders Placed This Encounter   Procedures    DEXA Bone Density Axial     Standing Status:   Future     Standing Expiration Date:   8/6/2024     Order Specific Question:   Reason for Exam:     Answer:   follow up     Order Specific Question:   Release to patient     Answer:   Routine Release    DEXA Bone Density Axial     Standing Status:   Future     Standing Expiration Date:   8/6/2024     Order Specific Question:   Reason for Exam:     Answer:   follow up     Order Specific Question:   Release to patient     Answer:   Routine Release    Mammo Screening Digital Tomosynthesis Bilateral With CAD     Breast Exam is negative     Standing Status:   Future     Standing Expiration Date:   8/6/2024     Order Specific Question:   Reason for Exam:     Answer:   screen     Order Specific Question:   Release to  patient     Answer:   Routine Release     No orders of the defined types were placed in this encounter.        Follow up: 1 year(s)         This note was electronically signed.    Daniel Ziegler MD  August 21, 2023

## 2023-08-10 ENCOUNTER — OFFICE VISIT (OUTPATIENT)
Dept: PSYCHIATRY | Facility: CLINIC | Age: 73
End: 2023-08-10
Payer: MEDICARE

## 2023-08-10 DIAGNOSIS — F41.1 GAD (GENERALIZED ANXIETY DISORDER): Primary | ICD-10-CM

## 2023-08-10 DIAGNOSIS — F34.1 DYSTHYMIC DISORDER: ICD-10-CM

## 2023-08-10 PROCEDURE — 90837 PSYTX W PT 60 MINUTES: CPT | Performed by: SOCIAL WORKER

## 2023-08-10 NOTE — PROGRESS NOTES
Date: August 10, 2023  Time In: 1:50 pm  Time Out: 2:45 pm    PROGRESS NOTE    Chief Complaint: Depression and anxiety     Data:Maggie is a 73 y.o., female who presents for individual therapy session at [facility]. Patient on time, clean and casually dressed  without evidence of intoxication, withdrawal, or perceptual disturbance.   Patient was seen by cardiologist  who referred her to pulmonology.  Doctors are thinking pulmonary hypotension.  Blood pressure is higher than normal, she is aware of when to seek assistance. Continues to feel very fatigues, shortness of breath, less motivation to complete tasks.  She reports that she has weight gain and low mood.   Was able to see her grandson off for college and has noticed that her daughter is having a hard time with having her son go to college. Has set some limits with her daughter Naty which is a new behavior for the patient.  Shares that  bought her a new vacuum but overall things are about the same.  Shares more about her husbands odd behaviors and memory issues.  Patient shares an incident where ambulance and police were called when this person spoke to her  who was just intoxicated.  Patient shares that her children called her hysterical that an ambulance was at the house while she was notified by another neighbor that something was wrong with her .  Patient shares that she recognizes that now everyone knows about husbands drinking.  patient shares that she did not become upset or overly distraught but was pleasantly surprised that a neighbor she does not know very well exchanged phone numbers as a support     Depression Loss of Interest, Feelings of guilt/worthlessness, and Low energy  Generalized Anxiety  Excess Worry, Restless/Edgy, Easily fatigued, and Muscle tension. Onset of symptoms was vague.  Symptoms are associated with relationship problem with unchanged since last visit, financial burdens, and lack of support.   Symptoms are aggravated by anxiety, lonely, sadness, stress, and weight management.   Symptoms improve with medication management, therapy, and personal self-care (wellness) Current rates severity of symptoms, on a scale of 1-10 (10 is the most severe) 7 Context Family and social history was reviewed     Clinical Maneuvering/Intervention:    Discussed the therapist/patient relationship and explain the parameters and limitations of relative confidentiality.  Also discussed the importance of active participation, and honesty to the treatment process.  Encouraged the patient to discuss/vent their feelings, frustrations, and fears concerning their ongoing issues and validated their feelings.  Assisted patient in processing above session content; acknowledged and normalized patient's thoughts, feelings, and concerns. Discussing trusting that she know herself best and continuing to push the doctors to find answers, using motivational interviewing techniques to assist the patient in making the decision necessary to find answers and improve overall medical care for herself and getting older what that means to her.  Rationalized patient thought process regarding recent marital stress praising patient for how she has responded recently. Therapist applied Interactive Feedback and Mindfulness Training and encouraged the patient to use positive coping skills such as Playing with a pet, Cleaning the house, Energy redirection, Reframe the way you are thinking about the problem, and Establish healthy boundaries .    Allowed patient to freely discuss issues without interruption or judgment. Provided safe, confidential environment to facilitate the development of positive therapeutic relationship and encourage open, honest communication. Assisted patient in identifying risk factors which would indicate the need for higher level of care including thoughts to harm self or others and/or self-harming behavior and encouraged patient to  contact this office, call 911, or present to the nearest emergency room should any of these events occur. Discussed crisis intervention services and means to access. Patient adamantly and convincingly denies current suicidal or homicidal ideation or perceptual disturbance.    Assessment     Psychiatric/Behavioral: Negative for agitation, behavioral problems, decreased concentration, dysphoric mood, hallucinations, self-injury, sleep disturbance, suicidal ideas, negative for hyperactivity. Positive for depressed mood and stress. The patient is nervous/anxious.        Patient appears to maintain relative stability as compared to their baseline. However, patient continues to struggle with anxiety and depression which continues to cause impairment in important areas of functioning. A result, they can be reasonably expected to continue to benefit from treatment and would likely be at increased risk for decompensation otherwise.      Mental Status Exam  Appearance:  appropriate  Attitude toward clinician:  cooperative and agreeable   Speech:               Rate:  regular rate and rhythm              Volume: normal  Motor:  no abnormal movements   Mood: Dysphoric   affect:  euthymic  Thought Processes:  linear, logical, and goal directed  Thought Content:  Normal   Feeling Hopeless: absent  Suicidal Thoughts or Intent:  absent  Homicidal Thoughts:  absent  Perceptual Disturbance: no perceptual disturbance  Attention and Concentration:  good  Insight and Judgement:  good  Memory:  memory appears to be intact        Patient's Support Network Includes:  children and extended family    Functional Status: Moderate impairment   STRENGTHS: Motivated for treatment, Literate, and Articulate  Overall: Depressed     WEAKNESSES: Poor social support and Poor coping skills  Progress toward goal: Not at goal    Prognosis: Fair with Ongoing Treatment          Plan     PROGRESS TOWARD CURRENT PLAN OF CARE/TREATMENT PLAN:  Making  Progress    SHORT-TERM GOALS: The patient will  will express feelings to therapist each contact , will identify the severity of symptoms each contact, will be compliant with all treatment recommendations, and will work with therapist to help expose and extinguish irrational beliefs and conclusions that contribute to anxiety/depression    LONG-TERM GOALS: With the help of therapy, I would like to have a remission of symptoms of anxiety & depression for 6 months .     Plan   Crisis Plan:  Symptoms and/or behaviors to indicate a crisis: Thinking about suicide    What calming techniques or other strategies will patient use to de-esclate and stay safe: slow down, breathe, visualize calming self, think it though, listen to music, change focus, take a walk  Who is one person patient can contact to assist with de-escalationKate    Crisis Management: the Patient will contact staff or crisis line if symptoms exacerbate or if harm to self or others becomes a concern. Crisis resources include: Crisis Line [phone], 911, Local Law Enforcement, Hospitals in [place], [facility] 24/7 Emergency Room [phone].    Recommended Referrals: Psychiatrist/APRN and Medical Provider (PCP)  Patient will adhere to medication regimen as prescribed and report any side effects. Patient will contact this office, call 911 or present to the nearest emergency room should suicidal or homicidal ideations occur. Provide Cognitive Behavioral Therapy and Solution Focused Therapy to improve functioning, maintain stability, and avoid decompensation and the need for higher level of care.   Patient will continue in individual outpatient therapy with focus on improved functioning and coping skills, maintaining stability, and avoiding decompensation and the need for higher level of care.      Return in about 2-6 weeks, or earlier if symptoms worsen or fail to improve.    VISIT DIAGNOSIS:     ICD-10-CM ICD-9-CM   1. MIRIAM (generalized anxiety disorder)  F41.1 300.02    2. Dysthymic disorder  F34.1 300.4        Future Appointments         Provider Department Center    9/7/2023 12:00 PM Yumiko Alejandre LCSW [facility]    9/21/2023 12:00 PM Yumiko Alejandre LCSW [facility]    2/6/2024 1:00 PM Haim Hill MD [facility]    8/8/2024 1:30 PM Daniel Ziegler MD [facility] OBGYN [facility]              This document has been electronically signed by Yumiko Miles LCSW, [facility]   August 10, 2023 16:34 EDT    This note is not intended to be constructed as a statement or assessment for disability.      Part of this note may be an electronic transcription/translation of spoken language to printed text using the Dragon Dictation System.

## 2023-09-07 ENCOUNTER — OFFICE VISIT (OUTPATIENT)
Dept: PSYCHIATRY | Facility: CLINIC | Age: 73
End: 2023-09-07
Payer: MEDICARE

## 2023-09-07 ENCOUNTER — HOSPITAL ENCOUNTER (OUTPATIENT)
Dept: BONE DENSITY | Facility: HOSPITAL | Age: 73
Discharge: HOME | End: 2023-09-07
Payer: MEDICARE

## 2023-09-07 DIAGNOSIS — F41.1 GAD (GENERALIZED ANXIETY DISORDER): Primary | ICD-10-CM

## 2023-09-07 DIAGNOSIS — F34.1 DYSTHYMIC DISORDER: ICD-10-CM

## 2023-09-07 DIAGNOSIS — M85.88 OSTEOPENIA OF LUMBAR SPINE: ICD-10-CM

## 2023-09-07 PROCEDURE — 90834 PSYTX W PT 45 MINUTES: CPT | Performed by: SOCIAL WORKER

## 2023-09-07 NOTE — PROGRESS NOTES
Date: September 7, 2023  Time In: 12:12 pm  Time Out: 1:04 pm    PROGRESS NOTE    Chief Complaint:     Data:Maggie is a 73 y.o., female who presents for individual therapy session at [facility]. Patient on time, clean and casually dressed  without evidence of intoxication, withdrawal, or perceptual disturbance.  Patient shares updates about children, grandchildren and .  Patient shares that she has anxiety especially about what will happen or go wrong.   continues to be about the same, doesn't want to interact with others, grandchildren etc.  Had gained 20 pounds and lost 10 pounds and she can't identify anything she is doing anything differently. Continues to have difficulty with focus and attention. Shares that she has been going to see grandchildren 3 to 4 times a week. But has been trying to improve the interactions with her grandchildren.  Depression Loss of Interest, Low energy, and Impaired concentration  Generalized Anxiety  Excess Worry, Restless/Edgy, Easily fatigued, Muscle tension, and Decreased concentration. Onset of symptoms was vague.  Symptoms are associated with financial burdens and lack of support.  Symptoms are aggravated by anxiety, lonely, sadness, and stress.   Symptoms improve with medication management, therapy, and personal self-care (wellness) Current rates severity of symptoms, on a scale of 1-10 (10 is the most severe) 5 Context Family and social history was reviewed  Quality been intermittent without a consistent pattern.    Clinical Maneuvering/Intervention:    Discussed the therapist/patient relationship and explain the parameters and limitations of relative confidentiality.  Also discussed the importance of active participation, and honesty to the treatment process.  Encouraged the patient to discuss/vent their feelings, frustrations, and fears concerning their ongoing issues and validated their feelings.    Assisted patient in processing above  session content; acknowledged and normalized patient’s thoughts, feelings, and concerns. Continuing to processing patients desire to be help and ways she shows her love to others which at times is not being felt/heard. Praising the patient for her ongoing love and support of her family when it is discounted. Therapist applied CBT/REBT, Communication Skills, and Interactive Feedback and encouraged the patient to use positive coping skills such as Reframe the way you are thinking about the problem, Walk away (leave a situation that is causing you stress), Use positive self-talk, and Utilize resources/coping skills.  Allowed patient to freely discuss issues without interruption or judgment. Provided safe, confidential environment to facilitate the development of positive therapeutic relationship and encourage open, honest communication. Assisted patient in identifying risk factors which would indicate the need for higher level of care including thoughts to harm self or others and/or self-harming behavior and encouraged patient to contact this office, call 911, or present to the nearest emergency room should any of these events occur. Discussed crisis intervention services and means to access. Patient adamantly and convincingly denies current suicidal or homicidal ideation or perceptual disturbance.    Assessment     Psychiatric/Behavioral: Negative for agitation, behavioral problems, decreased concentration, dysphoric mood, hallucinations, self-injury, sleep disturbance, suicidal ideas, negative for hyperactivity. Positive for depressed mood and stress. The patient is nervous/anxious.        Patient appears to maintain relative stability as compared to their baseline. However, patient continues to struggle with anxiety depression and attention/focus which continues to cause impairment in important areas of functioning. A result, they can be reasonably expected to continue to benefit from treatment and would likely be  at increased risk for decompensation otherwise.    MENTAL STATUS EXAM   General Appearance:  Cleanly groomed and dressed  Eye Contact:  Good eye contact  Attitude:  Cooperative  Motor Activity:  Normal gait, posture  Speech:  Normal rate, tone, volume  Language:  Spontaneous  Mood and affect:  Normal, pleasant and anxious  Hopelessness:  Denies  Thought Process:  Logical, goal-directed and linear  Associations/ Thought Content:  No delusions  Hallucinations:  None  Suicidal Ideations:  Not present  Homicidal Ideation:  Not present  Sensorium:  Alert  Orientation:  Person, place, time and situation  Immediate Recall, Recent, and Remote Memory:  Intact  Attention Span/ Concentration:  Easily distracted  Fund of Knowledge:  Appropriate for age and educational level  Insight:  Good  Judgement:  Good  Reliability:  Good  Impulse Control:  Good      Patient's Support Network Includes:  children and extended family    Functional Status: Moderate impairment   STRENGTHS: Motivated for treatment, Literate, and Articulate  Overall: Anxious     WEAKNESSES: Poor social support and Poor coping skills  Progress toward goal: Not at goal    Prognosis: Fair with Ongoing Treatment          Plan     PROGRESS TOWARD CURRENT PLAN OF CARE/TREATMENT PLAN:  Making Progress    SHORT-TERM GOALS: The patient will  will express feelings to therapist each contact , will identify the severity of symptoms each contact, and will learn and practice at least 2 anxiety management techniques with goal of decreasing anxiety    LONG-TERM GOALS: With the help of therapy, I would like to remission of symptoms of anxiety & depression for 6 months .      Plan   Crisis Plan:  Symptoms and/or behaviors to indicate a crisis: Thinking about suicide    What calming techniques or other strategies will patient use to de-esclate and stay safe: slow down, breathe, visualize calming self, think it though, listen to music, change focus, take a walk  Who is one person  patient can contact to assist with de-escalation? Naty    Crisis Management: the Patient will contact staff or crisis line if symptoms exacerbate or if harm to self or others becomes a concern. Crisis resources include: Crisis Line [phone], 911, Local Law Enforcement, [place], [facility] 24/7 Emergency Room [phone].    Recommended Referrals: Psychiatrist/APRN and Medical Provider (PCP)  Patient will adhere to medication regimen as prescribed and report any side effects. Patient will contact this office, call 911 or present to the nearest emergency room should suicidal or homicidal ideations occur. Provide Cognitive Behavioral Therapy and Solution Focused Therapy to improve functioning, maintain stability, and avoid decompensation and the need for higher level of care.   Patient will continue in individual outpatient therapy with focus on improved functioning and coping skills, maintaining stability, and avoiding decompensation and the need for higher level of care.      Return in about 3-6 weeks, or earlier if symptoms worsen or fail to improve.    VISIT DIAGNOSIS:     ICD-10-CM ICD-9-CM   1. MIRIAM (generalized anxiety disorder)  F41.1 300.02   2. Major depressive disorder, recurrent episode, moderate  F33.1 296.32   3. Dysthymic disorder  F34.1 300.4        Future Appointments         Provider Department Center    9/7/2023 1:30 PM [facility]    9/25/2023 12:00 PM Yumiko Alejandre LCSW [facility]    10/10/2023 2:00 PM TAY BR SCREENING [facility]    2/6/2024 1:00 PM Haim Hill MD [facility]    8/8/2024 1:30 PM Daniel Ziegler MD [facility] OBGYN [facility]              This document has been electronically signed by Yumiko Miles LCSW, [facility]   September 7, 2023 12:16 EDT    This note is not  intended to be constructed as a statement or assessment for disability.      Part of this note may be an electronic transcription/translation of spoken language to printed text using the Dragon Dictation System.

## 2023-09-25 ENCOUNTER — OFFICE VISIT (OUTPATIENT)
Dept: PSYCHIATRY | Facility: CLINIC | Age: 73
End: 2023-09-25

## 2023-09-25 DIAGNOSIS — F34.1 DYSTHYMIC DISORDER: ICD-10-CM

## 2023-09-25 DIAGNOSIS — F41.1 GAD (GENERALIZED ANXIETY DISORDER): Primary | ICD-10-CM

## 2023-09-25 PROCEDURE — 90837 PSYTX W PT 60 MINUTES: CPT | Performed by: SOCIAL WORKER

## 2023-09-25 NOTE — PROGRESS NOTES
Date: September 25, 2023  Time In: 12:00 pm  Time Out: 12:55 pm    PROGRESS NOTE    Chief Complaint: anxiety, dysthymia    Data:Maggie is a 73 y.o., female who presents for individual therapy session at [facility]. Patient on time, clean and casually dressed  without evidence of intoxication, withdrawal, or perceptual disturbance.  Patient shares that she continues to feel very fatigued, short of breath and finds that she finds it difficult to complete daily activities.  Shares medical updates but no specific reason/answers to the change in how she is feeling since Jan.  Patient shares ongoing marital stress, feeling left out or excluded at times with her children.   Depression Low mood for > 2 weeks, Decreased/Increased sleep, Loss of Interest, Feelings of guilt/worthlessness, Low energy, Impaired concentration, and Psychomotor slowing  Generalized Anxiety  Excess Worry, Restless/Edgy, Easily fatigued, Muscle tension, and Decreased concentration. Onset of symptoms was vague.  Symptoms are associated with financial burdens and lack of support.  Symptoms are aggravated by anxiety, lonely, stress, and weight management.   Symptoms improve with medication management, therapy, and personal self-care (wellness) Current rates severity of symptoms, on a scale of 1-10 (10 is the most severe) 6 Context Family and social history was reviewed  Quality improved.    Clinical Maneuvering/Intervention:    Discussed the therapist/patient relationship and explain the parameters and limitations of relative confidentiality.  Also discussed the importance of active participation, and honesty to the treatment process.  Encouraged the patient to discuss/vent their feelings, frustrations, and fears concerning their ongoing issues and validated their feelings.    Allowed patient to freely discuss issues without interruption or judgment. Provided safe, confidential environment to facilitate the development of positive  therapeutic relationship and encourage open, honest communication. Assisted patient in processing above session content; acknowledged and normalized patient’s thoughts, feelings, and concerns.  For shared insights and ongoing changes in behaviors.  Rationalized patient thought process relationships and also regarding physical health.  Continued application of CBT with identification and challenging cognitive thoughts that contribute to worsening of patient's negative symptoms.  Therapist applied CBT/REBT and Interactive Feedback and encouraged the patient to use positive coping skills such as Reframe the way you are thinking about the problem, Establish healthy boundaries , and Utilize resources/coping skills.    Assisted patient in identifying risk factors which would indicate the need for higher level of care including thoughts to harm self or others and/or self-harming behavior and encouraged patient to contact this office, call 911, or present to the nearest emergency room should any of these events occur. Discussed crisis intervention services and means to access. Patient adamantly and convincingly denies current suicidal or homicidal ideation or perceptual disturbance.    Risk Assessment:  [x] No SI/HI, []  passive thoughts  []  suicidal ideation  intent, plan, and/or means  []  homicidal ideation  [] self-harm       Risk Level: History obtained from: patient and chart review.  Due to historical context and reported clinical markers, it appears patient meets criteria for low RISK to engage in self-harm or harm to others.  It is recommended patient  be evaluated and assessed each contact for intent, plan, means and/or lethality each contact.    Assessment     Psychiatric/Behavioral: Negative for agitation, behavioral problems, decreased concentration, dysphoric mood, hallucinations, self-injury, sleep disturbance, suicidal ideas, negative for hyperactivity. Positive for depressed mood and stress. The patient is  nervous/anxious.        Patient appears to maintain relative stability as compared to their baseline. However, patient continues to struggle with anxiety and depression which continues to cause impairment in important areas of functioning. A result, they can be reasonably expected to continue to benefit from treatment and would likely be at increased risk for decompensation otherwise.    MENTAL STATUS EXAM   General Appearance:  Cleanly groomed and dressed  Eye Contact:  Good eye contact  Attitude:  Cooperative  Motor Activity:  Normal gait, posture  Speech:  Normal rate, tone, volume  Language:  Spontaneous  Mood and affect:  Anxious  Associations/ Thought Content:  No delusions  Hallucinations:  None  Suicidal Ideations:  Not present  Homicidal Ideation:  Not present  Sensorium:  Alert  Orientation:  Person, place, time and situation  Immediate Recall, Recent, and Remote Memory:  Intact  Attention Span/ Concentration:  Easily distracted  Fund of Knowledge:  Appropriate for age and educational level  Insight:  Good  Judgement:  Good  Reliability:  Good  Impulse Control:  Good       Patient's Support Network Includes:  , children, and extended family    Functional Status: Moderate impairment   STRENGTHS: Motivated for treatment, Literate, Good family support, and Articulate  Overall: Anxious     WEAKNESSES: Poor social support and Poor coping skills  Progress toward goal: Not at goal    Prognosis: Guarded with Ongoing Treatment         Plan     PROGRESS TOWARD CURRENT PLAN OF CARE/TREATMENT PLAN:  Making Progress    SHORT-TERM GOALS: The patient will  will express feelings to therapist each contact , will identify the severity of symptoms each contact, and will engage in one self-care activity daily, per self-report    LONG-TERM GOALS: With the help of therapy, I would like to remission of symptoms of anxiety & depression for 6 months .       Plan   Crisis Plan:  Symptoms and/or behaviors to indicate a  crisis: Thinking about suicide    What calming techniques or other strategies will patient use to de-esclate and stay safe: slow down, breathe, visualize calming self, think it though, listen to music, change focus, take a walk  Who is one person patient can contact to assist with de-escalation? Naty    Crisis Management: the Patient will contact staff or crisis line if symptoms exacerbate or if harm to self or others becomes a concern. Crisis resources include: Crisis Line [phone], 911, Local Law Enforcement, [facility], [facility] 24/7 Emergency Room [phone].    Recommended Referrals: Psychiatrist/APRN and Medical Provider (PCP)  Patient will adhere to medication regimen as prescribed and report any side effects. Patient will contact this office, call 911 or present to the nearest emergency room should suicidal or homicidal ideations occur. Provide Cognitive Behavioral Therapy and Solution Focused Therapy to improve functioning, maintain stability, and avoid decompensation and the need for higher level of care.   Patient will continue in individual outpatient therapy with focus on improved functioning and coping skills, maintaining stability, and avoiding decompensation and the need for higher level of care.      Return in about 3-6 weeks, or earlier if symptoms worsen or fail to improve.    VISIT DIAGNOSIS:     ICD-10-CM ICD-9-CM   1. MIRIAM (generalized anxiety disorder)  F41.1 300.02   2. Dysthymic disorder  F34.1 300.4        Future Appointments         Provider Department Center    10/10/2023 2:00 PM [facility] SCREENING [facility]    10/11/2023 1:00 PM Yumiko Alejandre LCSW [facility]    10/25/2023 1:00 PM Yumiko Alejandre LCSW [facility]    2/6/2024 1:00 PM Haim Hill MD [facility]    8/8/2024 1:30 PM Daniel Ziegler,  MD [facility]              This document has been electronically signed by Yumiko Miles LCSW, LCADC   September 25, 2023 12:08 EDT    This note is not intended to be constructed as a statement or assessment for disability.      Part of this note may be an electronic transcription/translation of spoken language to printed text using the Dragon Dictation System.

## 2023-10-10 ENCOUNTER — HOSPITAL ENCOUNTER (OUTPATIENT)
Dept: MAMMOGRAPHY | Facility: HOSPITAL | Age: 73
Discharge: HOME | End: 2023-10-10
Admitting: OBSTETRICS & GYNECOLOGY
Payer: MEDICARE

## 2023-10-10 DIAGNOSIS — Z01.419 WOMEN'S ANNUAL ROUTINE GYNECOLOGICAL EXAMINATION: ICD-10-CM

## 2023-10-10 PROCEDURE — 77063 BREAST TOMOSYNTHESIS BI: CPT

## 2023-10-10 PROCEDURE — 77067 SCR MAMMO BI INCL CAD: CPT

## 2023-10-11 ENCOUNTER — OFFICE VISIT (OUTPATIENT)
Dept: PSYCHIATRY | Facility: CLINIC | Age: 73
End: 2023-10-11
Payer: MEDICARE

## 2023-10-11 DIAGNOSIS — F41.1 GAD (GENERALIZED ANXIETY DISORDER): Primary | ICD-10-CM

## 2023-10-11 DIAGNOSIS — F34.1 DYSTHYMIC DISORDER: ICD-10-CM

## 2023-10-11 PROCEDURE — 90837 PSYTX W PT 60 MINUTES: CPT | Performed by: SOCIAL WORKER
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
## 2023-10-25 ENCOUNTER — OFFICE VISIT (OUTPATIENT)
Dept: PSYCHIATRY | Facility: CLINIC | Age: 73
End: 2023-10-25
Payer: MEDICARE

## 2023-10-25 DIAGNOSIS — F34.1 DYSTHYMIC DISORDER: ICD-10-CM

## 2023-10-25 DIAGNOSIS — F41.1 GAD (GENERALIZED ANXIETY DISORDER): Primary | ICD-10-CM

## 2023-10-25 PROCEDURE — 90837 PSYTX W PT 60 MINUTES: CPT | Performed by: SOCIAL WORKER
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
## 2023-11-08 ENCOUNTER — OFFICE VISIT (OUTPATIENT)
Dept: PSYCHIATRY | Facility: CLINIC | Age: 73
End: 2023-11-08
Payer: MEDICARE

## 2023-11-08 DIAGNOSIS — F34.1 DYSTHYMIC DISORDER: ICD-10-CM

## 2023-11-08 DIAGNOSIS — F41.1 GAD (GENERALIZED ANXIETY DISORDER): Primary | ICD-10-CM

## 2023-11-08 PROCEDURE — 90837 PSYTX W PT 60 MINUTES: CPT | Performed by: SOCIAL WORKER
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
## 2023-12-12 ENCOUNTER — OFFICE VISIT (OUTPATIENT)
Dept: PSYCHIATRY | Facility: CLINIC | Age: 73
End: 2023-12-12
Payer: MEDICARE

## 2023-12-12 DIAGNOSIS — F41.1 GAD (GENERALIZED ANXIETY DISORDER): Primary | ICD-10-CM

## 2023-12-12 DIAGNOSIS — F34.1 DYSTHYMIC DISORDER: ICD-10-CM

## 2023-12-12 PROCEDURE — 90837 PSYTX W PT 60 MINUTES: CPT | Performed by: SOCIAL WORKER

## 2023-12-12 NOTE — PROGRESS NOTES
[facility]  Outpatient  Progress Note   Patient Status:  Established  Name:  Maggie Emery  :  1950  Date of Service: 2023  Time In: 15:00 EST  Time Out: 15:55     IDENTIFYING INFORMATION:  The patient is a 73 y.o. female who is here today for an outpatient follow-up appointment.      I, Maggie Emery, hereby acknowledge that I have the right to discuss the assessment, potential risks, and benefits of any recommended treatment.        Chief Complaint: Patient reports problems with depression and anxiety.     Session Goal:  Patient with explore and process thoughts/feelings/coping skills.    Subjective   HPI:  Patient arrived for session on time, clean and casually dressed without evidence of intoxication, withdrawal, or perceptual disturbance. Patient arrived as: age appropriate.  Patient indicates she is an open and willing participant in today's session.  Reports that she has been helping daughter/grand kids as she is able.  Has continued to try and avoid  when he is drinking.  when drinking lets the cat out and forgets to let the cat back in. Has returned to physical therapy to help alleviate the knee pain.  Has been trying to cut back caffeine to help mood.  Psycho-social stressor makes anxiety 7& depression worse.   Diagnotic Impression Update/Review:  Depression Low mood for > 2 weeks, Feelings of guilt/worthlessness, Low energy, and Impaired concentration  Generalized Anxiety  Excess Worry, Restless/Edgy, Easily fatigued, and Muscle tension. Onset of symptoms was vague.  Symptoms are associated with relationship problem with unchanged since last visit, financial burdens, chronic pain/pain management, and lack of support.  Symptoms are aggravated by anxiety, lonely, sadness, stress, and weight management.   Symptoms improve with medication management and therapy Current rates severity of symptoms, on a scale of 1-10 (10 is the most severe) 6  Context Family and social history was reviewed Quality been intermittent without a consistent pattern. Somatic Symptoms:  Somatic Symptoms: Patient endorses health concerns within the past 4 weeks:  back pain, pain in arms, legs, joints, or hips. , feeling tired or having little energy, trouble falling or staying asleep or sleeping too much.  , and constipation, loose bowels, or diarrhea.  .  It is recommended this individual follow up with the identified PCP to address any medical concerns.     Substance Use: denies   Recent labs:    Last Urine Toxicity           No data to display              Objective    Medical History: Areas Reviewed: The following portions of the patient's history were reviewed and updated as appropriate: allergies, current medications, past family history, past medical history, past social history, past surgical history, and problem list.    Vitals:  Weight:  No Weight Documented This Encounter  Height: No Height Documented This Encounter  BMI:  There is no height or weight on file to calculate BMI.  Education:  The benefits of a healthy diet and exercise were discussed with patient, especially the positive effects they have on mental health. Patient encouraged to consider lifestyle modification regarding  diet and exercise patterns to maximize results of mental health treatment.    Medication Review:    Current Outpatient Medications:   •  dilTIAZem (TIAZAC) 300 MG 24 hr capsule, diltiazem  mg capsule,24 hr,extended release  TAKE ONE CAPSULE BY MOUTH DAILY, Disp: , Rfl:   •  dilTIAZem CD (CARDIZEM CD) 300 MG 24 hr capsule, Take 1 capsule by mouth Daily., Disp: , Rfl:   •  DULoxetine (CYMBALTA) 60 MG capsule, Take 1 capsule by mouth Daily., Disp: 90 capsule, Rfl: 1  •  eszopiclone (Lunesta) 3 MG tablet, Take 1 tablet by mouth At Night As Needed for Sleep. Take immediately before bedtime, Disp: 30 tablet, Rfl: 4  •  fluticasone (FLONASE) 50 MCG/ACT nasal spray, Flonase Allergy Relief 50  mcg/actuation nasal spray,suspension  2 sprays each nostril, Disp: , Rfl:   •  lansoprazole (PREVACID) 30 MG capsule, Take 1 capsule by mouth Daily., Disp: , Rfl:   •  losartan-hydrochlorothiazide (HYZAAR) 100-25 MG per tablet, Take 1 tablet by mouth Daily., Disp: , Rfl:   •  Multiple Vitamins-Minerals (Systane ICaps AREDS2) capsule, ICaps AREDS2, Disp: , Rfl:   •  Potassium Chloride (KCL-20 PO), Take 20 tablets by mouth Daily., Disp: , Rfl:   •  pravastatin (PRAVACHOL) 80 MG tablet, Daily., Disp: , Rfl:   •  rizatriptan MLT (MAXALT-MLT) 5 MG disintegrating tablet, Take 1 tablet by mouth 1 (One) Time As Needed for Migraine. May repeat in 2 hours if needed, Disp: , Rfl:   •  solifenacin (VESICARE) 5 MG tablet, Daily., Disp: , Rfl:      Medication Compliance:  compliance with medication regimen;   Assessment & Plan    Trauma Assessment:  Patient denies having been hit, slapped, kicked or otherwise physically hurt by others since last visit as well as having been forced to engage in any unwanted sexual acts since last visit.       Risk Assessment:  Patient adamantly and convincingly denies having SI/HI with or without intent, plans, or means. Patient denies having Hallucinations/Illusions and Delusions.  Patient  denies self-harming behaviors i     Risk Level: History obtained from: patient and chart review.  Due to historical context and reported clinical markers, it appears patient meets criteria for LOW RISK to engage in self-harm or harm to others.  It is recommended Maggie be evaluated and assessed each contact for intent, plan, means and/or lethality each contact.    Behavior Health Review Of Systems:    Psychiatric/Behavioral: Negative for agitation, behavioral problems, decreased concentration, dysphoric mood, hallucinations, self-injury, sleep disturbance, suicidal ideas, negative for hyperactivity. Positive for depressed mood and stress. The patient is nervous/anxious.    Pertinent items are noted in  HPI.      MENTAL STATUS EXAM   General Appearance:  Cleanly groomed and dressed  Eye Contact:  Good eye contact  Attitude:  Cooperative  Motor Activity:  Normal gait, posture  Speech:  Normal rate, tone, volume  Language:  Spontaneous  Mood and affect:  Anxious  Hopelessness:  Denies  Thought Process:  Logical, goal-directed and linear  Associations/ Thought Content:  No delusions  Hallucinations:  None  Suicidal Ideations:  Not present  Homicidal Ideation:  Not present  Sensorium:  Alert  Orientation:  Person, place, time and situation  Immediate Recall, Recent, and Remote Memory:  Intact  Attention Span/ Concentration:  Easily distracted  Fund of Knowledge:  Appropriate for age and educational level  Insight:  Fair and good  Judgement:  Good  Reliability:  Good  Impulse Control:  Fair and good    Treatment plan status:  Active   Treatment plan progress: Progressing  SHORT-TERM GOALS: The patient will  will learn and practice at least 2 anxiety management techniques with goal of decreasing anxiety, will learn and practice at least 2 depression management techniques with goal of decreasing depression, will work with therapist to help expose and extinguish irrational beliefs and conclusions that contribute to anxiety/depression, will work with therapist to identify conflicts from the past and the present that form the basis, will engage in one self-care activity daily, per self-report, and will engage in one enjoyable activity daily, per self-report     LONG-TERM GOALS: With the help of therapy, I would like to with the help of therapy, I would like to report self scale anxiety & depression at 3 or less.     Prognosis: Guarded with Ongoing Treatment  Functional Status: Moderate impairment   Disposition:   Patient does not appear to be malingering.     Session Summary: Therapist met individually with patient this date. Discussed progress in treatment and any needs/concerns. Also discussed the importance of active  participation, and honesty to the treatment process.  Encouraged the patient to discuss/vent their feelings, frustrations, and fears concerning their ongoing issues and validated their feelings. Assisted patient in processing above session content; acknowledged and normalized patient's thoughts, feelings, and concerns.  Therapist applied Exploration of Coping Skills, Interactive Feedback, Person Centered, and Positive Coping Skills and encouraged the patient to use positive coping skills such as Exercising, Playing with a pet, Reframe the way you are thinking about the problem, Self Care (Take care of your body in a way that makes you feel good - paint your nails, do your hair, put on a face mask), Use positive self-talk, Keep a positive attitude, Take deep breaths, and Utilize resources/coping skills   Assisted patient in identifying risk factors which would indicate the need for higher level of care including thoughts to harm self or others and/or self-harming behavior and encouraged patient to contact this office, call 911, or present to the nearest emergency room should any of these events occur. Discussed crisis intervention services and means to access.  The primary encounter diagnosis was MIRIAM (generalized anxiety disorder). A diagnosis of Dysthymic disorder was also pertinent to this visit..      Visit Diagnosis/Plan    ICD-10-CM ICD-9-CM   1. MIRIAM (generalized anxiety disorder)  F41.1 300.02   2. Dysthymic disorder  F34.1 300.4     Clinical Maneuvering:  All treatment options available at [facility] explored and documented.  The benefits of a healthy diet and exercise were discussed with patient, especially the positive effects they have on mental health. Patient encouraged to consider lifestyle modification regarding  diet and exercise patterns to maximize results of mental health treatment.  Reviewed previous available documentation  Reviewed most recent available labs      Justification for therapy: Patient continues to struggle with a chronic/pervasive mental illness which continues to cause impairment in at least two important areas of functioning.  Patient appear(s) to maintain relative stability as compared to the  baseline measure.  Patient can reasonably be expected to continue to benefit from treatment and would likely be at increased risk for decompensation if treatment were stopped.  Patient endorses a positive benefit from therapy and appears to meet outpatient level of care. Patient expresses gratitude and reports she had a positive experience today.    Recommended Referrals: Psychiatrist/APRN and Medical Provider (PCP)    Follow Up:  Return in about 4 weeks, or earlier if symptoms worsen or fail to improve for next follow up visit.      The patient understands that treatment is conditional on adhering to all [facility] Outpatient Policy and Procedures.  The patient understands that providers/clinic has discretion to dismissed them from care if these are breached and a recommendation for further care will be made at time of discharge.    Future Appointments         Provider Department Center    2/6/2024 1:00 PM Haim Hill MD [facility]    8/8/2024 1:30 PM Daniel Ziegler MD [facility]            This document electronically signed by Yumiko Miles LCSW, [facility] December 12, 2023 15:04 EST    DISCLOSURE: This document is intended for medical expert use only. Reading of this document by patients and/or patient's family without participating in medical staff guidance may result in misinterpretation and unintended morbidity. Any interpretation of such data is the responsibility of the patient and/or family member responsible for the patient in concert with their primary or specialist providers, and NOT to be left for sources of online searches such as [employer], [employer] or  similar queries. Relying on these approaches to knowledge may result in misinterpretation, misguided goals of care and even death should patients or family members try recommendations outside of the realm of professional medical care in a supervised way.    Oj Disclaimer:  Please note that portions of this documentation may have been completed with a voice recognition program.  Efforts were made to edit this dictation, but occasional words may have been mistranscribed.

## 2024-01-04 ENCOUNTER — OFFICE VISIT (OUTPATIENT)
Dept: PSYCHIATRY | Facility: CLINIC | Age: 74
End: 2024-01-04
Payer: MEDICARE

## 2024-01-04 DIAGNOSIS — F41.1 GAD (GENERALIZED ANXIETY DISORDER): Primary | ICD-10-CM

## 2024-01-04 DIAGNOSIS — F34.1 DYSTHYMIC DISORDER: ICD-10-CM

## 2024-01-04 PROCEDURE — 90837 PSYTX W PT 60 MINUTES: CPT | Performed by: SOCIAL WORKER

## 2024-01-04 NOTE — PROGRESS NOTES
[facility]  Outpatient  Progress Note   Patient Status:  Established  Name:  Maggie Emery  :  1950  Date of Service: 2024  Time In: 15:55 EST  Time Out: 1650 pm     IDENTIFYING INFORMATION:  The patient is a 74 y.o. female who is here today for an outpatient follow-up appointment.      I, Maggie Emery, hereby acknowledge that I have the right to discuss the assessment, potential risks, and benefits of any recommended treatment.      Chief Complaint: Patient reports problems with depression and anxiety.     Session Goal:  Patient with explore and process thoughts/feelings/coping skills.    Subjective   HPI:  Patient arrived for session on time, clean and casually dressed without evidence of intoxication, withdrawal, or perceptual disturbance. Patient arrived as: age appropriate.  Patient indicates she is an open and willing participant in today's session.  Patient shares that she spent most of the holiday at home which was different.  Discussed not being as close as she would like to adult children and discussed.  Patient shares that  has been drinking more frequently and appears to go out of his way to be mean/vindictive.  Shares about daughters and son and relationships. Shares her own loneliness at times and how she is just trying to make the best of things despite the daily struggle she has.  Patient acknowledges anxiety about leaving  and what that would entail.  Has a firm limit and will leave if that were to be crossed.   Diagnotic Impression Update/Review:  Depression Low mood for > 2 weeks, Feelings of guilt/worthlessness, Low energy, and Impaired concentration  Generalized Anxiety  Excess Worry, Restless/Edgy, Easily fatigued, Muscle tension, and Decreased concentration. Onset of symptoms was vague.  Symptoms are associated with relationship problem with unchanged since last visit, financial burdens, and lack of support.  Symptoms are  aggravated by anxiety, lonely, sadness, stress, and weight management.   Symptoms improve with medication management and therapy Current rates severity of symptoms, on a scale of 1-10 (10 is the most severe) 7 Context Family and social history was reviewed a Quality worsened.Patient finds it Very difficult to engage in meaningful activities of daily life.    Somatic Symptoms:  Somatic Symptoms: Patient endorses health concerns within the past 4 weeks:  pain in arms, legs, joints, or hips. , feeling tired or having little energy, and shortness of breath.  It is recommended this individual follow up with the identified PCP to address any medical concerns.     Substance Use: denies    Recent labs:    Last Urine Toxicity           No data to display              Objective    Medical History: Areas Reviewed: The following portions of the patient's history were reviewed and updated as appropriate: allergies, current medications, past family history, past medical history, past social history, past surgical history, and problem list.    Vitals:  Weight:  No Weight Documented This Encounter  Height: No Height Documented This Encounter  BMI:  There is no height or weight on file to calculate BMI.  Education:  The benefits of a healthy diet and exercise were discussed with patient, especially the positive effects they have on mental health. Patient encouraged to consider lifestyle modification regarding  diet and exercise patterns to maximize results of mental health treatment.    Medication Review:    Current Outpatient Medications:     dilTIAZem (TIAZAC) 300 MG 24 hr capsule, diltiazem  mg capsule,24 hr,extended release  TAKE ONE CAPSULE BY MOUTH DAILY, Disp: , Rfl:     dilTIAZem CD (CARDIZEM CD) 300 MG 24 hr capsule, Take 1 capsule by mouth Daily., Disp: , Rfl:     DULoxetine (CYMBALTA) 60 MG capsule, Take 1 capsule by mouth Daily., Disp: 90 capsule, Rfl: 1    eszopiclone (Lunesta) 3 MG tablet, Take 1 tablet by mouth  At Night As Needed for Sleep. Take immediately before bedtime, Disp: 30 tablet, Rfl: 4    fluticasone (FLONASE) 50 MCG/ACT nasal spray, Flonase Allergy Relief 50 mcg/actuation nasal spray,suspension  2 sprays each nostril, Disp: , Rfl:     lansoprazole (PREVACID) 30 MG capsule, Take 1 capsule by mouth Daily., Disp: , Rfl:     losartan-hydrochlorothiazide (HYZAAR) 100-25 MG per tablet, Take 1 tablet by mouth Daily., Disp: , Rfl:     Multiple Vitamins-Minerals (Systane ICaps AREDS2) capsule, ICaps AREDS2, Disp: , Rfl:     Potassium Chloride (KCL-20 PO), Take 20 tablets by mouth Daily., Disp: , Rfl:     pravastatin (PRAVACHOL) 80 MG tablet, Daily., Disp: , Rfl:     rizatriptan MLT (MAXALT-MLT) 5 MG disintegrating tablet, Take 1 tablet by mouth 1 (One) Time As Needed for Migraine. May repeat in 2 hours if needed, Disp: , Rfl:     solifenacin (VESICARE) 5 MG tablet, Daily., Disp: , Rfl:      Medication Compliance:  compliance with medication regimen;   Assessment & Plan    Trauma Assessment:  Patient denies having been hit, slapped, kicked or otherwise physically hurt by others since last visit as well as having been forced to engage in any unwanted sexual acts since last visit.       Risk Assessment:  Patient adamantly and convincingly denies having SI/HI with or without intent, plans, or means. Patient denies having Hallucinations/Illusions and Delusions.  Patient  denies self-harming behaviors      Risk Level: History obtained from: patient and chart review.  Due to historical context and reported clinical markers, it appears patient meets criteria for LOW RISK to engage in self-harm or harm to others.  It is recommended Maggie be evaluated and assessed each contact for intent, plan, means and/or lethality each contact.    Behavior Health Review Of Systems:    Psychiatric/Behavioral: Negative for agitation, behavioral problems, decreased concentration, dysphoric mood, hallucinations, self-injury, sleep disturbance,  suicidal ideas, negative for hyperactivity. Positive for depressed mood and stress. The patient is nervous/anxious.    Pertinent items are noted in HPI.      MENTAL STATUS EXAM   General Appearance:  Cleanly groomed and dressed  Eye Contact:  Good eye contact  Attitude:  Cooperative  Motor Activity:  Normal gait, posture  Speech:  Normal rate, tone, volume  Language:  Spontaneous  Mood and affect:  Normal, pleasant  Hopelessness:  Denies  Thought Process:  Logical, goal-directed and linear  Associations/ Thought Content:  No delusions  Hallucinations:  None  Suicidal Ideations:  Not present  Homicidal Ideation:  Not present  Sensorium:  Alert  Orientation:  Person, place, time and situation  Immediate Recall, Recent, and Remote Memory:  Intact  Attention Span/ Concentration:  Easily distracted  Fund of Knowledge:  Appropriate for age and educational level  Insight:  Good  Judgement:  Good  Reliability:  Good  Impulse Control:  Good    Treatment plan status:  Active   Treatment plan progress: Ongoing  SHORT-TERM GOALS: The patient will  will express feelings to therapist each contact , will identify the severity of symptoms each contact, and will work with therapist to help expose and extinguish irrational beliefs and conclusions that contribute to anxiety/depression    LONG-TERM GOALS: With the help of therapy, I would like to self scale anxiety & depression at 3 or less, have a plan for my future.    Prognosis: Guarded with Ongoing Treatment  Functional Status: Moderate impairment   Disposition:   Patient does not appear to be malingering.     Session Summary: Therapist met individually with patient this date. Discussed progress in treatment and any needs/concerns. Also discussed the importance of active participation, and honesty to the treatment process.  Encouraged the patient to discuss/vent their feelings, frustrations, and fears concerning their ongoing issues and validated their feelings. Assisted patient in  processing above session content; acknowledged and normalized patient's thoughts, feelings, and concerns.  Therapist discussed patient triggers associated with increase suki anxiety and depression.  The primary encounter diagnosis was MIRIAM (generalized anxiety disorder). A diagnosis of Dysthymic disorder was also pertinent to this visit..  Rationalized patient thought process regarding relationships and life events. Therapist applied CBT/REBT and Exploration of Relationship Patterns and encouraged the patient to use positive coping skills such as Exercising, Listen to music, Playing with a pet, Releasing pent up emotions, Distraction, and Reframe the way you are thinking about the problem.  . Assisted patient in identifying risk factors which would indicate the need for higher level of care including thoughts to harm self or others and/or self-harming behavior and encouraged patient to contact this office, call 911, or present to the nearest emergency room should any of these events occur. Discussed crisis intervention services and means to access    Visit Diagnosis/Plan    ICD-10-CM ICD-9-CM   1. MIRIAM (generalized anxiety disorder)  F41.1 300.02   2. Dysthymic disorder  F34.1 300.4     Clinical Maneuvering:  All treatment options available at [facility] explored and documented.  The benefits of a healthy diet and exercise were discussed with patient, especially the positive effects they have on mental health. Patient encouraged to consider lifestyle modification regarding  diet and exercise patterns to maximize results of mental health treatment.  Reviewed previous available documentation  Reviewed most recent available labs     Justification for therapy: Patient continues to struggle with a chronic/pervasive mental illness which continues to cause impairment in at least two important areas of functioning.  Patient appear(s) to maintain relative stability as compared to the  baseline measure.   Patient can reasonably be expected to continue to benefit from treatment and would likely be at increased risk for decompensation if treatment were stopped.  Patient endorses a positive benefit from therapy and appears to meet outpatient level of care. Patient expresses gratitude and reports she had a positive experience today.    Recommended Referrals: Psychiatrist/APRN and Medical Provider (PCP)    Follow Up:  Return in about 2-4 weeks, or earlier if symptoms worsen or fail to improve for next follow up visit.    The patient understands that treatment is conditional on adhering to all [facility] Outpatient Policy and Procedures.  The patient understands that providers/clinic has discretion to dismissed them from care if these are breached and a recommendation for further care will be made at time of discharge.    Future Appointments         Provider Department Center    1/18/2024 1:00 PM Yumiko Alejandre LCSW [facility]    1/30/2024 1:00 PM Haim Hill MD [facility]    2/1/2024 2:00 PM Yumiko Alejandre LCSW [facility]    8/8/2024 1:30 PM Daniel Ziegler MD [facility]            This document electronically signed by Yumiko Miles LCSW, [facility] January 4, 2024 17:04 EST    DISCLOSURE: This document is intended for medical expert use only. Reading of this document by patients and/or patient's family without participating in medical staff guidance may result in misinterpretation and unintended morbidity. Any interpretation of such data is the responsibility of the patient and/or family member responsible for the patient in concert with their primary or specialist providers, and NOT to be left for sources of online searches such as [employer] [place], [employer] or similar queries. Relying on these approaches to knowledge may result in  misinterpretation, misguided goals of care and even death should patients or family members try recommendations outside of the realm of professional medical care in a supervised way.    Oj Disclaimer:  Please note that portions of this documentation may have been completed with a voice recognition program.  Efforts were made to edit this dictation, but occasional words may have been mistranscribed.

## 2024-01-18 DIAGNOSIS — F51.01 PRIMARY INSOMNIA: ICD-10-CM

## 2024-01-18 DIAGNOSIS — F41.1 GAD (GENERALIZED ANXIETY DISORDER): ICD-10-CM

## 2024-01-18 DIAGNOSIS — F34.1 DYSTHYMIC DISORDER: ICD-10-CM

## 2024-01-22 RX ORDER — ESZOPICLONE 3 MG/1
3 TABLET, FILM COATED ORAL NIGHTLY PRN
Qty: 30 TABLET | Refills: 0 | Status: SHIPPED | OUTPATIENT
Start: 2024-01-22 | End: 2025-01-21

## 2024-01-22 RX ORDER — DULOXETIN HYDROCHLORIDE 60 MG/1
60 CAPSULE, DELAYED RELEASE ORAL DAILY
Qty: 90 CAPSULE | Refills: 0 | Status: SHIPPED | OUTPATIENT
Start: 2024-01-22

## 2024-02-21 ENCOUNTER — OFFICE VISIT (OUTPATIENT)
Dept: PSYCHIATRY | Facility: CLINIC | Age: 74
End: 2024-02-21
Payer: MEDICARE

## 2024-02-21 DIAGNOSIS — F34.1 DYSTHYMIC DISORDER: ICD-10-CM

## 2024-02-21 DIAGNOSIS — F41.1 GAD (GENERALIZED ANXIETY DISORDER): Primary | ICD-10-CM

## 2024-02-21 PROCEDURE — 90837 PSYTX W PT 60 MINUTES: CPT | Performed by: SOCIAL WORKER
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
## 2024-02-22 ENCOUNTER — OFFICE VISIT (OUTPATIENT)
Dept: PSYCHIATRY | Facility: CLINIC | Age: 74
End: 2024-02-22
Payer: MEDICARE

## 2024-02-22 DIAGNOSIS — F34.1 DYSTHYMIC DISORDER: ICD-10-CM

## 2024-02-22 DIAGNOSIS — F41.1 GAD (GENERALIZED ANXIETY DISORDER): Primary | ICD-10-CM

## 2024-02-22 DIAGNOSIS — F51.01 PRIMARY INSOMNIA: ICD-10-CM

## 2024-02-22 PROCEDURE — 99214 OFFICE O/P EST MOD 30 MIN: CPT | Performed by: PSYCHIATRY & NEUROLOGY

## 2024-02-22 RX ORDER — DULOXETIN HYDROCHLORIDE 60 MG/1
60 CAPSULE, DELAYED RELEASE ORAL DAILY
Qty: 90 CAPSULE | Refills: 0 | Status: SHIPPED | OUTPATIENT
Start: 2024-02-22

## 2024-02-22 RX ORDER — ESZOPICLONE 3 MG/1
3 TABLET, FILM COATED ORAL NIGHTLY PRN
Qty: 30 TABLET | Refills: 3 | Status: SHIPPED | OUTPATIENT
Start: 2024-02-22 | End: 2025-02-21
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
## 2024-03-13 ENCOUNTER — OFFICE VISIT (OUTPATIENT)
Dept: PSYCHIATRY | Facility: CLINIC | Age: 74
End: 2024-03-13
Payer: MEDICARE

## 2024-03-13 DIAGNOSIS — F41.1 GAD (GENERALIZED ANXIETY DISORDER): Primary | ICD-10-CM

## 2024-03-13 DIAGNOSIS — F34.1 DYSTHYMIC DISORDER: ICD-10-CM

## 2024-03-13 PROCEDURE — 90837 PSYTX W PT 60 MINUTES: CPT | Performed by: SOCIAL WORKER

## 2024-03-13 NOTE — PROGRESS NOTES
[facility]  Outpatient  Progress Note   Patient Status:  Established  Name:  Maggie Emery  :  1950  Date of Service: 2024  Time In: 10:58 EDT  Time Out: 11:54     IDENTIFYING INFORMATION:  The patient is a 74 y.o. female who is here today for an outpatient follow-up appointment.      I, Maggie Emery, hereby acknowledge that I have the right to discuss the assessment, potential risks, and benefits of any recommended treatment.    Chief Complaint: Patient reports problems with depression and anxiety.     Session Goal:  Patient with explore and process thoughts/feelings/coping skills.    Subjective   HPI:  Patient arrived for session on time, clean and casually dressed without evidence of intoxication, withdrawal, or perceptual disturbance. Patient arrived as: age appropriate.  Patient indicates she is an open and willing participant in today's session.  Has surgery for hiatal hernia/GERD on Tuesday. Having a lot of pain from her knee, has follow up this week. Has been helping a neighbor but it is stressful.  is still being nice to her and is focusing on the lawsuit and win some money but its nice that he has a focus on something.   Daughter is going to [place]  and patients feelings were hurt but patient isn't saying anything about it. Perhaps asking  for money to go and stay in a hotel because of not having enough room.  Patient shares several plans for the future with an overall positive outlook, despite some challenges.  Diagnotic Impression Update/Review:  Depression Low mood for > 2 weeks, Decreased/Increased sleep, Feelings of guilt/worthlessness, and Low energy  Generalized Anxiety  Excess Worry, Restless/Edgy, and Easily fatigued. Onset of symptoms was vague.  Symptoms are associated with lack of support.  Symptoms are aggravated by anxiety, lonely, sadness, and stress.   Symptoms improve with medication management and therapy Current rates  severity of symptoms, on a scale of 1-10 (10 is the most severe) 5 Context Family and social history was reviewed  Quality improved.  Somatic Symptoms:  Somatic Symptoms: Patient endorses health concerns within the past 4 weeks:  pain in arms, legs, joints, or hips. , feeling tired or having little energy, trouble falling or staying asleep or sleeping too much.  , shortness of breath, and nausea, gas, or indigestions. .  It is recommended this individual follow up with the identified PCP to address any medical concerns.     Substance Use: denies          Recent labs:    Last Urine Toxicity           No data to display              Objective    Medical History: Areas Reviewed: The following portions of the patient's history were reviewed and updated as appropriate: allergies, current medications, past family history, past medical history, past social history, past surgical history, and problem list.    Vitals:  Weight:  No Weight Documented This Encounter  Height: No Height Documented This Encounter  BMI:  There is no height or weight on file to calculate BMI.  Education:  The benefits of a healthy diet and exercise were discussed with patient, especially the positive effects they have on mental health. Patient encouraged to consider lifestyle modification regarding  diet and exercise patterns to maximize results of mental health treatment.    Medication Review:    Current Outpatient Medications:     dilTIAZem (TIAZAC) 300 MG 24 hr capsule, diltiazem  mg capsule,24 hr,extended release  TAKE ONE CAPSULE BY MOUTH DAILY, Disp: , Rfl:     dilTIAZem CD (CARDIZEM CD) 300 MG 24 hr capsule, Take 1 capsule by mouth Daily., Disp: , Rfl:     DULoxetine (CYMBALTA) 60 MG capsule, Take 1 capsule by mouth Daily., Disp: 90 capsule, Rfl: 0    eszopiclone (Lunesta) 3 MG tablet, Take 1 tablet by mouth At Night As Needed for Sleep. Take immediately before bedtime, Disp: 30 tablet, Rfl: 3    fluticasone (FLONASE) 50 MCG/ACT nasal  spray, Flonase Allergy Relief 50 mcg/actuation nasal spray,suspension  2 sprays each nostril, Disp: , Rfl:     lansoprazole (PREVACID) 30 MG capsule, Take 1 capsule by mouth Daily., Disp: , Rfl:     losartan-hydrochlorothiazide (HYZAAR) 100-25 MG per tablet, Take 1 tablet by mouth Daily., Disp: , Rfl:     Multiple Vitamins-Minerals (Systane ICaps AREDS2) capsule, ICaps AREDS2, Disp: , Rfl:     Potassium Chloride (KCL-20 PO), Take 20 tablets by mouth Daily., Disp: , Rfl:     pravastatin (PRAVACHOL) 80 MG tablet, Daily., Disp: , Rfl:     rizatriptan MLT (MAXALT-MLT) 5 MG disintegrating tablet, Take 1 tablet by mouth 1 (One) Time As Needed for Migraine. May repeat in 2 hours if needed, Disp: , Rfl:     solifenacin (VESICARE) 5 MG tablet, Daily., Disp: , Rfl:      Medication Compliance:  compliance with medication regimen;   Assessment & Plan    Trauma Assessment:  Patient denies having been hit, slapped, kicked or otherwise physically hurt by others since last visit as well as having been forced to engage in any unwanted sexual acts since last visit.       Risk Assessment:  Patient adamantly and convincingly denies having SI/HI with or without intent, plans, or means. Patient denies having Hallucinations/Illusions and Delusions.  Patient  denies self-harming     Risk Level: History obtained from: patient and chart review.  Due to historical context and reported clinical markers, it appears patient meets criteria for LOW RISK to engage in self-harm or harm to others.  It is recommended Maggie be evaluated and assessed each contact for intent, plan, means and/or lethality each contact.    Behavior Health Review Of Systems:    Psychiatric/Behavioral: Negative for agitation, behavioral problems, decreased concentration, dysphoric mood, hallucinations, self-injury, sleep disturbance, suicidal ideas, negative for hyperactivity. Positive for sleep disturbance and stress. The patient is nervous/anxious.    Pertinent items are  noted in HPI.      MENTAL STATUS EXAM   General Appearance:  Cleanly groomed and dressed  Eye Contact:  Good eye contact  Attitude:  Cooperative  Motor Activity:  Normal gait, posture  Speech:  Normal rate, tone, volume  Language:  Spontaneous  Mood and affect:  Normal, pleasant and euthymic  Hopelessness:  Denies  Loneliness: Denies  Thought Process:  Logical, goal-directed and linear  Associations/ Thought Content:  No delusions  Hallucinations:  None  Suicidal Ideations:  Not present  Homicidal Ideation:  Not present  Sensorium:  Alert  Orientation:  Person, place, time and situation  Immediate Recall, Recent, and Remote Memory:  Intact  Attention Span/ Concentration:  Easily distracted  Fund of Knowledge:  Appropriate for age and educational level  Insight:  Good  Judgement:  Good  Reliability:  Good  Impulse Control:  Good    Treatment plan status:  Active   Treatment plan progress: Progressing  Prognosis: Fair with Ongoing Treatment   Functional Status: Moderate impairment   Disposition:   Patient does not appear to be malingering.     Session Summary: Therapist met individually with patient this date. Discussed progress in treatment and any needs/concerns. Also discussed the importance of active participation, and honesty to the treatment process.  Encouraged the patient to discuss/vent their feelings, frustrations, and fears concerning their ongoing issues and validated their feelings. Assisted patient in processing above session content; acknowledged and normalized patient's thoughts, feelings, and concerns.  Therapist discussed patient triggers associated with emotional distress.  The primary encounter diagnosis was MIRIAM (generalized anxiety disorder). A diagnosis of Dysthymic disorder was also pertinent to this visit.. Rationalized patient thought process regarding interacting with adult children and . Therapist applied CBT/REBT, Exploration of Relationship Patterns, Person Centered, and Positive  Coping Skills and encouraged the patient to use positive coping skills such as Drawing/Art, Listen to music, Playing with a pet, Take deep breaths, Keep calm by thinking, and Utilize resources/coping skills. Assisted patient in identifying risk factors which would indicate the need for higher level of care including thoughts to harm self or others and/or self-harming behavior and encouraged patient to contact this office, call 911, or present to the nearest emergency room should any of these events occur. Discussed crisis intervention services and means to access    Visit Diagnosis/Plan    ICD-10-CM ICD-9-CM   1. MIRIAM (generalized anxiety disorder)  F41.1 300.02   2. Dysthymic disorder  F34.1 300.4     Clinical Maneuvering:  All treatment options available at [facility] explored and documented.  The benefits of a healthy diet and exercise were discussed with patient, especially the positive effects they have on mental health. Patient encouraged to consider lifestyle modification regarding  diet and exercise patterns to maximize results of mental health treatment.  Reviewed previous available documentation  Reviewed most recent available labs     Justification for therapy: Patient continues to struggle with a chronic/pervasive mental illness which continues to cause impairment in at least two important areas of functioning.  Patient appear(s) to maintain relative stability as compared to the  baseline measure.  Patient can reasonably be expected to continue to benefit from treatment and would likely be at increased risk for decompensation if treatment were stopped.  Patient endorses a positive benefit from therapy and appears to meet outpatient level of care. Patient expresses gratitude and reports she had a positive experience today.    Recommended Referrals: Psychiatrist/APRN and Medical Provider (PCP)    Follow Up:  Return in about 2 weeks, or earlier if symptoms worsen or fail to improve  for next follow up visit.      The patient understands that treatment is conditional on adhering to all [facility] Outpatient Policy and Procedures.  The patient understands that providers/clinic has discretion to dismissed them from care if these are breached and a recommendation for further care will be made at time of discharge.    Future Appointments         Provider Department Center    3/13/2024 11:00 AM Yumiko Alejandre LCSW [facility]    3/27/2024 1:00 PM Yumiko Alejandre LCSW [facility]    6/20/2024 1:00 PM Haim Hill MD [facility]    8/8/2024 1:30 PM Daniel Ziegler MD [facility]            This document electronically signed by Yumiko Miles LCSW, [facility] March 13, 2024 10:58 EDT    DISCLOSURE: This document is intended for medical expert use only. Reading of this document by patients and/or patient's family without participating in medical staff guidance may result in misinterpretation and unintended morbidity. Any interpretation of such data is the responsibility of the patient and/or family member responsible for the patient in concert with their primary or specialist providers, and NOT to be left for sources of online searches such as [employer], [employer] or similar queries. Relying on these approaches to knowledge may result in misinterpretation, misguided goals of care and even death should patients or family members try recommendations outside of the realm of professional medical care in a supervised way.    Dragon Disclaimer:  Please note that portions of this documentation may have been completed with a voice recognition program.  Efforts were made to edit this dictation, but occasional words may have been mistranscribed.

## 2024-04-25 ENCOUNTER — TELEMEDICINE (OUTPATIENT)
Dept: PSYCHIATRY | Facility: CLINIC | Age: 74
End: 2024-04-25
Payer: MEDICARE

## 2024-04-25 DIAGNOSIS — F41.1 GAD (GENERALIZED ANXIETY DISORDER): Primary | ICD-10-CM

## 2024-04-25 DIAGNOSIS — F34.1 DYSTHYMIC DISORDER: ICD-10-CM

## 2024-04-25 PROCEDURE — 90834 PSYTX W PT 45 MINUTES: CPT | Performed by: SOCIAL WORKER

## 2024-04-25 NOTE — PROGRESS NOTES
[facility]  Outpatient Telehealth Progress Note   Patient Status:  Established  Name:  Maggie Emery  :  1950  Date of Service: 2024  Time In: 10:15 EDT  Time Out: 10:58      HIPAA: You have chosen to receive care through a video visit today. This provider is located at home address in connection with the [facility] ([facility]), [address], [place] [address] The Patient is seen remotely via telehealth at  ([address] [place] [address] [place] [address]) using [employer] platforms and is in a secure environment for this session. The patient's condition being diagnosed/treated is appropriate for telemedicine. The provider identified herself and credentials LCSW, GOPALDC.  The patient and/or guardian consent(s) to be seen remotely, and when consent is given, she understand(s) consent allows for patient identifiable information to be sent to a third party as needed. Maggie can refuse to be seen remotely at any time. The electronic data is encrypted and password protected, and the patient has been advised of the potential risks to privacy, not withstanding such measures.    You have chosen to receive care through a telehealth visit.  Do you consent to use a video/audio connection for your medical care today? YES     Patient identifiers utilized: Name and date of birth.    IDENTIFYING INFORMATION:  The patient is a 74 y.o. female who presents for  an outpatient follow-up appointment.      I, Maggie Emery, hereby acknowledge that I have the right to discuss the assessment, potential risks, and benefits of any recommended treatment.    Chief Complaint: Patient reports problems with depression and anxiety.     Session Goal:  Patient with explore and process thoughts/feelings/coping skills.    Subjective   HPI:  Patient arrived for session after some difficulty logging in to the video session, clean and casually dressed without  evidence of intoxication, withdrawal, or perceptual disturbance. Patient arrived as: age appropriate.  Patient indicates she is an open and willing participant in today's session. Patient shares that she had her hiatal hernia repaired  but had complications and spent a week in the hospital for a repair. Daughters came to stay with her during the day. The patient shares that the pulmonologist was insistent that her shortness of breath could be from the hiatal hernia. Since the surgery the shortness of breath is getting less. Patient reports that her appetite is also decreased. Last week she had to put her dog down and this was sad but Mick did go with her and was supportive.   has been drunk since dogs death. Patient shares that she did start seeds for the garden and they did grown. Patient shares that this week she had not slept Monday or Tuesday night but was able to sleep last night.  Daughter in [place] is still stressed out and patient has not offered any advice. Son texted her which is nice.  Depression Low mood for > 2 weeks, Decreased/Increased sleep, Loss of Interest, Feelings of guilt/worthlessness, Low energy, Impaired concentration, and Change in appetite  Generalized Anxiety  Excess Worry, Restless/Edgy, Easily fatigued, Muscle tension, and Decreased sleep. Onset of symptoms was vague.  Symptoms are associated with relationship problem which with unchanged since last visit, financial burdens, and lack of support.  Symptoms are aggravated by .   Symptoms improve with medication management, therapy, and personal self-care (wellness) Current rates severity of symptoms, on a scale of 1-10 (10 is the most severe) 7 Context Family and social history was reviewed  Quality been intermittent without a consistent pattern.    Anxiety: Patient currently rates the severity of anxiety symptoms, on a scale of 1-10 (10 is the most severe), a 8.   Depression: Patient currently rates the severity of depressive  symptoms, on a scale of 1-10 (10 is the most severe), a 7.  Patient finds it Very difficult to engage in meaningful activities of daily life.    Note:  See PHQ-9/MIRIAM-7 worksheets dated 4/12  patient reports that she is feeling about the same    PHQ-9 Total Score:   16  15-19 = Moderately severe depression  14   10-14 = Moderate anxiety    Somatic Symptoms:  Somatic Symptoms: Patient endorses health concerns within the past 4 weeks: endorses feeling tired or having little energy and trouble falling or staying asleep or sleeping too much.  .  It is recommended this individual follow up with the identified PCP to address any medical concerns.     Substance Use:endorses      Recent labs:    Last Urine Toxicity           No data to display              Objective    Medical History: Areas Reviewed: The following portions of the patient's history were reviewed and updated as appropriate: allergies, current medications, past family history, past medical history, past social history, past surgical history, and problem list.    Vitals:  Weight:  No Weight Documented This Encounter  Height: No Height Documented This Encounter  BMI:  There is no height or weight on file to calculate BMI.  Education:  The benefits of a healthy diet and exercise were discussed with patient, especially the positive effects they have on mental health. Patient encouraged to consider lifestyle modification regarding  diet and exercise patterns to maximize results of mental health treatment.    Medication Review:    Current Outpatient Medications:     dilTIAZem (TIAZAC) 300 MG 24 hr capsule, diltiazem  mg capsule,24 hr,extended release  TAKE ONE CAPSULE BY MOUTH DAILY, Disp: , Rfl:     dilTIAZem CD (CARDIZEM CD) 300 MG 24 hr capsule, Take 1 capsule by mouth Daily., Disp: , Rfl:     DULoxetine (CYMBALTA) 60 MG capsule, Take 1 capsule by mouth Daily., Disp: 90 capsule, Rfl: 0    eszopiclone (Lunesta) 3 MG tablet, Take 1 tablet by mouth At Night As  Needed for Sleep. Take immediately before bedtime, Disp: 30 tablet, Rfl: 3    fluticasone (FLONASE) 50 MCG/ACT nasal spray, Flonase Allergy Relief 50 mcg/actuation nasal spray,suspension  2 sprays each nostril, Disp: , Rfl:     lansoprazole (PREVACID) 30 MG capsule, Take 1 capsule by mouth Daily., Disp: , Rfl:     losartan-hydrochlorothiazide (HYZAAR) 100-25 MG per tablet, Take 1 tablet by mouth Daily., Disp: , Rfl:     Multiple Vitamins-Minerals (Systane ICaps AREDS2) capsule, ICaps AREDS2, Disp: , Rfl:     Potassium Chloride (KCL-20 PO), Take 20 tablets by mouth Daily., Disp: , Rfl:     pravastatin (PRAVACHOL) 80 MG tablet, Daily., Disp: , Rfl:     rizatriptan MLT (MAXALT-MLT) 5 MG disintegrating tablet, Take 1 tablet by mouth 1 (One) Time As Needed for Migraine. May repeat in 2 hours if needed, Disp: , Rfl:     solifenacin (VESICARE) 5 MG tablet, Daily., Disp: , Rfl:      Medication Compliance:  compliance with medication regimen; Side Effects reported:  no.        Assessment & Plan    Trauma Assessment:  Patient denies having been hit, slapped, kicked or otherwise physically hurt by others since last visit as well as having been forced to engage in any unwanted sexual acts since last visit.       Risk Assessment:  Patient adamantly and convincingly denies having SI/HI with or without intent, plans, or means. Patient denies having Hallucinations/Illusions and Delusions.  Patient  denies self-harming behaviors       Risk Level: History obtained from: patient and chart review.  Due to historical context and reported clinical markers, it appears patient meets criteria for LOW RISK to engage in self-harm or harm to others.  It is recommended Maggie be evaluated and assessed each contact for intent, plan, means and/or lethality each contact.    Behavior Health Review Of Systems:    Psychiatric/Behavioral: Negative for agitation, behavioral problems, decreased concentration, dysphoric mood, hallucinations, self-injury,  suicidal ideas, negative for hyperactivity. Positive for depressed mood, sleep disturbance and stress. The patient is nervous/anxious.    Pertinent items are noted in HPI.    MENTAL STATUS EXAM   General Appearance:  Cleanly groomed and dressed  Eye Contact:  Good eye contact  Attitude:  Cooperative  Motor Activity:  Normal gait, posture  Speech:  Normal rate, tone, volume  Language:  Spontaneous  Mood and affect:  Anxious  Hopelessness:  Denies  Loneliness: 3  Thought Process:  Logical, goal-directed and linear  Associations/ Thought Content:  No delusions  Hallucinations:  None  Suicidal Ideations:  Not present  Homicidal Ideation:  Not present  Sensorium:  Alert  Orientation:  Person, place, time and situation  Immediate Recall, Recent, and Remote Memory:  Intact  Attention Span/ Concentration:  Easily distracted  Fund of Knowledge:  Appropriate for age and educational level  Intellectual Functioning:  Above average  Insight:  Good  Judgement:  Good  Reliability:  Good  Impulse Control:  Good       Treatment plan status:  Active   Treatment plan progress: Progressing  Prognosis: Guarded with Ongoing Treatment  Functional Status: Moderate impairment   Disposition:   Patient does not appear to be malingering.     Session Summary: Therapist met individually with patient this date. Discussed progress in treatment and any needs/concerns.  Encouraged the patient to discuss/vent their feelings, frustrations, and fears concerning their ongoing issues and validated their feelings. Assisted patient in processing above session content; acknowledged and normalized patient’s thoughts, feelings, and concerns. Discussing boundaries/co-dependency and healthy life style. Therapist applied CBT/REBT and Interactive Feedback and encouraged the patient to use positive coping skills such as Reframe the way you are thinking about the problem, Self Care (Take care of your body in a way that makes you feel good - paint your nails, do  your hair, put on a face mask), Take deep breaths, and Utilize resources/coping skills.    Allowed patient to freely discuss issues without interruption or judgment. Provided safe, confidential environment to facilitate the development of positive therapeutic relationship and encourage open, honest communication.     Visit Diagnosis/Plan    ICD-10-CM ICD-9-CM   1. MIRIAM (generalized anxiety disorder)  F41.1 300.02   2. Dysthymic disorder  F34.1 300.4     R/O ADHD    Clinical Maneuvering:  All treatment options available at [facility] explored and documented.  The benefits of a healthy diet and exercise were discussed with patient, especially the positive effects they have on mental health. Patient encouraged to consider lifestyle modification regarding  diet and exercise patterns to maximize results of mental health treatment.  Reviewed previous available documentation  Reviewed most recent available labs     Justification for therapy: Patient continues to struggle with a chronic/pervasive mental illness which continues to cause impairment in at least two important areas of functioning.  Patient appear(s) to maintain relative stability as compared to the  baseline measure.  Patient can reasonably be expected to continue to benefit from treatment and would likely be at increased risk for decompensation if treatment were stopped.  Patient endorses a positive benefit from therapy and appears to meet outpatient level of care. Patient expresses gratitude and reports she had a positive experience today.    Recommended Referrals: Psychiatrist/APRN and Medical Provider (PCP)    Follow Up:  Return in about 4 weeks, or earlier if symptoms worsen or fail to improve for next follow up visit. [phone]      The patient understands that treatment is conditional on adhering to all Behavioral Health Outpatient Policy and Procedures.  The patient understands that providers/clinic has discretion to dismissed  them from care if these are breached and a recommendation for further care will be made at time of discharge.    Future Appointments         Provider Department Center    6/20/2024 1:00 PM Haim Hill MD [facility]    8/8/2024 1:30 PM Daniel Zieglre MD [facility]            This document electronically signed by Yumiko Miles LCSW, [facility] April 25, 2024 10:43 EDT    DISCLOSURE: This document is intended for medical expert use only. Reading of this document by patients and/or patient's family without participating in medical staff guidance may result in misinterpretation and unintended morbidity. Any interpretation of such data is the responsibility of the patient and/or family member responsible for the patient in concert with their primary or specialist providers, and NOT to be left for sources of online searches such as Displair, Jymob or similar queries. Relying on these approaches to knowledge may result in misinterpretation, misguided goals of care and even death should patients or family members try recommendations outside of the realm of professional medical care in a supervised way.    Oj Disclaimer:  Please note that portions of this documentation may have been completed with a voice recognition program.  Efforts were made to edit this dictation, but occasional words may have been mistranscribed.

## 2024-05-23 ENCOUNTER — TELEMEDICINE (OUTPATIENT)
Dept: PSYCHIATRY | Facility: CLINIC | Age: 74
End: 2024-05-23
Payer: MEDICARE

## 2024-05-23 DIAGNOSIS — F41.1 GAD (GENERALIZED ANXIETY DISORDER): Primary | ICD-10-CM

## 2024-05-23 DIAGNOSIS — F34.1 DYSTHYMIC DISORDER: ICD-10-CM

## 2024-05-23 PROCEDURE — 90837 PSYTX W PT 60 MINUTES: CPT | Performed by: SOCIAL WORKER

## 2024-05-23 NOTE — PROGRESS NOTES
[facility]  Outpatient Telehealth Progress Note   Patient Status:  Established  Name:  Maggie Emery  :  1950  Date of Service: May 23, 2024  Time In: 10:00 EDT  Time Out: 10:54     HIPAA: You have chosen to receive care through a video visit today. This provider is located at home address in connection with the [facility] (through [facility]), [address], [place] [address] The Patient is seen remotely via telehealth at  ([address] [place] [address] [place] [id number]) using [employer] platforms and is in a secure environment for this session. The patient's condition being diagnosed/treated is appropriate for telemedicine. The provider identified herself and credentials LCSW, GOPALDC.  The patient and/or guardian consent(s) to be seen remotely, and when consent is given, she understand(s) consent allows for patient identifiable information to be sent to a third party as needed. Maggie can refuse to be seen remotely at any time. The electronic data is encrypted and password protected, and the patient has been advised of the potential risks to privacy, not withstanding such measures.    You have chosen to receive care through a telehealth visit.  Do you consent to use a video/audio connection for your medical care today? YES Verified the patients identify using Name and date of birth.    IDENTIFYING INFORMATION:  The patient is a 74 y.o. female who presents for  an outpatient follow-up appointment.      I, Maggie Emery, hereby acknowledge that I have the right to discuss the assessment, potential risks, and benefits of any recommended treatment.    Chief Complaint: Patient reports problems with depression and anxiety.     Session Goal:  Patient with explore and process thoughts/feelings/coping skills to prevent deterioration of mood and need for a higher level of care.    Subjective   HPI:  Patient arrived for session on time, clean and  casually dressed without evidence of intoxication, withdrawal, or perceptual disturbance. Patient arrived as: age appropriate.  Patient indicates she is an open and willing participant in today's session.   Patient shares that  continues to drink and talks about selling the house when he is drink.  The patent shares that she has been busy  helping daughter in [place], able to attend granddaughters graduation which was enjoyable.  Has been following up regularly with medical providers and has a scheduled procedure  during the first week of June. Saw pictures of granddaughters basic training and she will be sent to [place] for next training. Was able to talk to older daughter who responded in a way that was unhelpful.  Depression Decreased/Increased sleep, Feelings of guilt/worthlessness, Low energy, and Impaired concentration  Generalized Anxiety  Excess Worry, Easily fatigued, Muscle tension, Decreased sleep, and Decreased concentration. Onset of symptoms was vague.  Symptoms are associated with relationship problem with unchanged since last visit, financial burdens, and lack of support.  Symptoms are aggravated by anxiety, lonely, sadness, stress, and weight management.   Symptoms improve with medication management and therapy Current rates severity of symptoms, on a scale of 1-10 (10 is the most severe) 6 Context Family and social history was reviewed  Quality been intermittent without a consistent pattern.    Somatic Symptoms:  Somatic Symptoms: Patient endorses health concerns within the past 4 weeks: denies stomach pains, pain in arms, legs, joints, or hips. , feeling tired or having little energy, trouble falling or staying asleep or sleeping too much.  , and nausea, gas, or indigestions. .  It is recommended this individual follow up with the identified PCP to address any medical concerns.     Substance Use:endorses  alcohol use  Recent labs:    Last Urine Toxicity           No data to display               Objective    Medical History: Areas Reviewed: The following portions of the patient's history were reviewed and updated as appropriate: allergies, current medications, past family history, past medical history, past social history, past surgical history, and problem list.    Vitals:  Weight:  No Weight Documented This Encounter  Height: No Height Documented This Encounter  BMI:  There is no height or weight on file to calculate BMI.  Education:  The benefits of a healthy diet and exercise were discussed with patient, especially the positive effects they have on mental health. Patient encouraged to consider lifestyle modification regarding  diet and exercise patterns to maximize results of mental health treatment.    Medication Review:    Current Outpatient Medications:     dilTIAZem (TIAZAC) 300 MG 24 hr capsule, diltiazem  mg capsule,24 hr,extended release  TAKE ONE CAPSULE BY MOUTH DAILY, Disp: , Rfl:     dilTIAZem CD (CARDIZEM CD) 300 MG 24 hr capsule, Take 1 capsule by mouth Daily., Disp: , Rfl:     DULoxetine (CYMBALTA) 60 MG capsule, Take 1 capsule by mouth Daily., Disp: 90 capsule, Rfl: 0    eszopiclone (Lunesta) 3 MG tablet, Take 1 tablet by mouth At Night As Needed for Sleep. Take immediately before bedtime, Disp: 30 tablet, Rfl: 3    fluticasone (FLONASE) 50 MCG/ACT nasal spray, Flonase Allergy Relief 50 mcg/actuation nasal spray,suspension  2 sprays each nostril, Disp: , Rfl:     lansoprazole (PREVACID) 30 MG capsule, Take 1 capsule by mouth Daily., Disp: , Rfl:     losartan-hydrochlorothiazide (HYZAAR) 100-25 MG per tablet, Take 1 tablet by mouth Daily., Disp: , Rfl:     Multiple Vitamins-Minerals (Systane ICaps AREDS2) capsule, ICaps AREDS2, Disp: , Rfl:     Potassium Chloride (KCL-20 PO), Take 20 tablets by mouth Daily., Disp: , Rfl:     pravastatin (PRAVACHOL) 80 MG tablet, Daily., Disp: , Rfl:     rizatriptan MLT (MAXALT-MLT) 5 MG disintegrating tablet, Take 1 tablet by mouth 1 (One)  Time As Needed for Migraine. May repeat in 2 hours if needed, Disp: , Rfl:     solifenacin (VESICARE) 5 MG tablet, Daily., Disp: , Rfl:      Medication Compliance:  compliance with medication regimen;  Assessment & Plan    Trauma Assessment:  Patient denies having been hit, slapped, kicked or otherwise physically hurt by others since last visit as well as having been forced to engage in any unwanted sexual acts since last visit.     Risk Assessment:  Patient adamantly and convincingly denies having SI/HI with or without intent, plans, or means. Patient denies having Hallucinations/Illusions and Delusions.  Patient  denies self-harming behaviors i   Risk Level: History obtained from: patient and chart review.  Due to historical context and reported clinical markers, it appears patient meets criteria for LOW RISK to engage in self-harm or harm to others.  It is recommended Maggie be evaluated and assessed each contact for intent, plan, means and/or lethality each contact.  Behavior Health Review Of Systems:  Psychiatric/Behavioral: Negative for agitation, behavioral problems, dysphoric mood, hallucinations, self-injury,  suicidal ideas, negative for hyperactivity. Positive for depressed mood, decreased concentration, sleep disturbance and stress. The patient is nervous/anxious.    Pertinent items are noted in HPI.    MENTAL STATUS EXAM   General Appearance:  Cleanly groomed and dressed  Eye Contact:  Good eye contact  Attitude:  Cooperative  Motor Activity:  Normal gait, posture  Speech:  Normal rate, tone, volume  Language:  Spontaneous  Mood and affect:  Anxious  Hopelessness:  Denies  Loneliness: Denies  Thought Process:  Logical, goal-directed and linear  Associations/ Thought Content:  No delusions  Hallucinations:  None  Suicidal Ideations:  Not present  Homicidal Ideation:  Not present  Sensorium:  Alert  Orientation:  Person, place, time and situation  Immediate Recall, Recent, and Remote Memory:   Intact  Attention Span/ Concentration:  Easily distracted  Fund of Knowledge:  Appropriate for age and educational level  Insight:  Good  Judgement:  Good  Reliability:  Good  Impulse Control:  Good     Treatment plan status:  Quarterly Review 05/23/24   Treatment plan progress: Partially Met  Prognosis: Fair with Ongoing Treatment   Functional Status: Moderate impairment   Disposition:   Patient does not appear to be malingering.     Session Summary: Therapist met individually with patient this date. Discussed progress in treatment and any needs/concerns. Encouraged the patient to discuss/vent their feelings, frustrations, and fears concerning their ongoing issues and validated their feelings. Acknowledging the changes she has made in her life and her responses to  when he is drunk. Assisted patient in processing above session content; acknowledged and normalized patient’s thoughts, feelings, and concerns. Patient discussing recent interaction with one child which bothered her because of the response, making a comment that patient could not recall a time that it happened.-still hurts patient feelings.  Processing this situation offering unconditional regard. . Reviewing safety for the patient and praised the patient for getting the gun out of the house. Therapist applied CBT/REBT, Positive Coping Skills, Preventative Services, and Review of Treatment Plan and encouraged the patient to use positive coping skills such as Spending time in nature, Reframe the way you are thinking about the problem, Use positive self-talk, Keep a positive attitude, Take deep breaths, Demonstrate self-control, and Utilize resources/coping skills.  Allowed patient to freely discuss issues without interruption or judgment. Provided safe, confidential environment to facilitate the development of positive therapeutic relationship and encourage open, honest communication.     Visit Diagnosis/Plan    ICD-10-CM ICD-9-CM   1. MIRIAM  (generalized anxiety disorder)  F41.1 300.02   2. Dysthymic disorder  F34.1 300.4   ADHD symptoms    Clinical Maneuvering:  All treatment options available at [facility] explored and documented.  The benefits of a healthy diet and exercise were discussed with patient, especially the positive effects they have on mental health. Patient encouraged to consider lifestyle modification regarding  diet and exercise patterns to maximize results of mental health treatment.  Reviewed previous available documentation  Reviewed most recent available labs     Justification for therapy: Patient continues to struggle with a chronic/pervasive mental illness which continues to cause impairment in at least two important areas of functioning.  Patient appear(s) to maintain relative stability as compared to the  baseline measure.  Patient can reasonably be expected to continue to benefit from treatment and would likely be at increased risk for decompensation if treatment were stopped.  Patient endorses a positive benefit from therapy and appears to meet outpatient level of care. Patient expresses gratitude and reports she had a positive experience today.    Recommended Referrals: Psychiatrist/APRN and Medical Provider (PCP)  Follow Up:  Return in about 4-6 weeks, or earlier if symptoms worsen or fail to improve for next follow up visit. [phone]    The patient understands that treatment is conditional on adhering to all Behavioral Health Outpatient Policy and Procedures.  The patient understands that providers/clinic has discretion to dismissed them from care if these are breached and a recommendation for further care will be made at time of discharge.    Future Appointments         Provider Department Center    6/20/2024 1:00 PM Haim Hill MD [facility]    7/15/2024 11:00 AM Yumiko Alejandre LCSW [facility]  [facility]    8/8/2024 1:30 PM Daniel Ziegler MD [facility]            This document electronically signed by Yumiko Miles LCSW, [facility] May 23, 2024 10:27 EDT    DISCLOSURE: This document is intended for medical expert use only. Reading of this document by patients and/or patient's family without participating in medical staff guidance may result in misinterpretation and unintended morbidity. Any interpretation of such data is the responsibility of the patient and/or family member responsible for the patient in concert with their primary or specialist providers, and NOT to be left for sources of online searches such as [employer], [employer] or similar queries. Relying on these approaches to knowledge may result in misinterpretation, misguided goals of care and even death should patients or family members try recommendations outside of the realm of professional medical care in a supervised way.    Oj Disclaimer:  Please note that portions of this documentation may have been completed with a voice recognition program.  Efforts were made to edit this dictation, but occasional words may have been mistranscribed.

## 2024-05-28 ENCOUNTER — TRANSCRIBE ORDERS (OUTPATIENT)
Dept: ADMINISTRATIVE | Facility: HOSPITAL | Age: 74
End: 2024-05-28
Payer: MEDICARE

## 2024-05-28 DIAGNOSIS — K44.9 PARAESOPHAGEAL HERNIA: Primary | ICD-10-CM

## 2024-05-29 ENCOUNTER — HOSPITAL ENCOUNTER (OUTPATIENT)
Facility: HOSPITAL | Age: 74
Discharge: HOME | End: 2024-05-29
Admitting: THORACIC SURGERY (CARDIOTHORACIC VASCULAR SURGERY)
Payer: MEDICARE

## 2024-05-29 DIAGNOSIS — K44.9 PARAESOPHAGEAL HERNIA: ICD-10-CM

## 2024-05-29 PROCEDURE — 71250 CT THORAX DX C-: CPT

## 2024-05-30 ENCOUNTER — TRANSCRIBE ORDERS (OUTPATIENT)
Dept: ADMINISTRATIVE | Facility: HOSPITAL | Age: 74
End: 2024-05-30
Payer: MEDICARE

## 2024-05-30 DIAGNOSIS — K44.9 PARAESOPHAGEAL HERNIA: Primary | ICD-10-CM

## 2024-06-18 DIAGNOSIS — F34.1 DYSTHYMIC DISORDER: ICD-10-CM

## 2024-06-18 DIAGNOSIS — F51.01 PRIMARY INSOMNIA: ICD-10-CM

## 2024-06-18 DIAGNOSIS — F41.1 GAD (GENERALIZED ANXIETY DISORDER): ICD-10-CM

## 2024-06-18 RX ORDER — DULOXETIN HYDROCHLORIDE 60 MG/1
60 CAPSULE, DELAYED RELEASE ORAL DAILY
Qty: 90 CAPSULE | Refills: 0 | Status: SHIPPED | OUTPATIENT
Start: 2024-06-18

## 2024-06-18 RX ORDER — ESZOPICLONE 3 MG/1
3 TABLET, FILM COATED ORAL NIGHTLY PRN
Qty: 30 TABLET | Refills: 0 | Status: SHIPPED | OUTPATIENT
Start: 2024-06-18 | End: 2024-06-18 | Stop reason: SDUPTHER

## 2024-06-18 NOTE — TELEPHONE ENCOUNTER
Patient requested Lunesta be resent to [facility] in [place] states she needs it before the mail delivery can get it to her

## 2024-06-19 DIAGNOSIS — F51.01 PRIMARY INSOMNIA: ICD-10-CM

## 2024-06-19 RX ORDER — ESZOPICLONE 3 MG/1
3 TABLET, FILM COATED ORAL NIGHTLY PRN
Qty: 30 TABLET | Refills: 0 | Status: CANCELLED | OUTPATIENT
Start: 2024-06-19 | End: 2025-06-19

## 2024-06-19 RX ORDER — ESZOPICLONE 3 MG/1
3 TABLET, FILM COATED ORAL NIGHTLY PRN
Qty: 30 TABLET | Refills: 0 | Status: SHIPPED | OUTPATIENT
Start: 2024-06-19 | End: 2025-06-19

## 2024-06-19 NOTE — TELEPHONE ENCOUNTER
Pt is needing Lunesta resent to [facility] in [place] instead of [facility]. Pharmacy has been updated in chart.

## 2024-06-25 ENCOUNTER — OFFICE VISIT (OUTPATIENT)
Dept: PSYCHIATRY | Facility: CLINIC | Age: 74
End: 2024-06-25
Payer: MEDICARE

## 2024-06-25 VITALS
OXYGEN SATURATION: 97 % | SYSTOLIC BLOOD PRESSURE: 128 MMHG | DIASTOLIC BLOOD PRESSURE: 70 MMHG | BODY MASS INDEX: 31.57 KG/M2 | HEIGHT: 61 IN | WEIGHT: 167.2 LBS | HEART RATE: 76 BPM

## 2024-06-25 DIAGNOSIS — F34.1 DYSTHYMIC DISORDER: ICD-10-CM

## 2024-06-25 DIAGNOSIS — F41.1 GAD (GENERALIZED ANXIETY DISORDER): ICD-10-CM

## 2024-06-25 DIAGNOSIS — F51.01 PRIMARY INSOMNIA: ICD-10-CM

## 2024-06-25 PROCEDURE — 3074F SYST BP LT 130 MM HG: CPT | Performed by: PSYCHIATRY & NEUROLOGY

## 2024-06-25 PROCEDURE — 99214 OFFICE O/P EST MOD 30 MIN: CPT | Performed by: PSYCHIATRY & NEUROLOGY

## 2024-06-25 PROCEDURE — 3078F DIAST BP <80 MM HG: CPT | Performed by: PSYCHIATRY & NEUROLOGY

## 2024-06-25 RX ORDER — ROSUVASTATIN CALCIUM 20 MG/1
1 TABLET, FILM COATED ORAL DAILY
COMMUNITY
Start: 2024-01-18

## 2024-06-25 RX ORDER — METOCLOPRAMIDE 5 MG/1
5 TABLET ORAL 3 TIMES DAILY
COMMUNITY
Start: 2024-06-04

## 2024-06-25 RX ORDER — DULOXETIN HYDROCHLORIDE 60 MG/1
60 CAPSULE, DELAYED RELEASE ORAL DAILY
Qty: 90 CAPSULE | Refills: 0 | Status: SHIPPED | OUTPATIENT
Start: 2024-06-25

## 2024-06-25 RX ORDER — ESZOPICLONE 3 MG/1
3 TABLET, FILM COATED ORAL NIGHTLY PRN
Qty: 30 TABLET | Refills: 2 | Status: SHIPPED | OUTPATIENT
Start: 2024-06-25 | End: 2025-06-25

## 2024-06-25 RX ORDER — POTASSIUM CHLORIDE 1500 MG/1
1 TABLET, EXTENDED RELEASE ORAL DAILY
COMMUNITY
Start: 2024-02-12

## 2024-06-25 RX ORDER — METOPROLOL SUCCINATE 50 MG
50 TABLET, EXTENDED RELEASE 24 HR ORAL
COMMUNITY

## 2024-06-25 RX ORDER — NIFEDIPINE 60 MG/1
60 TABLET, EXTENDED RELEASE ORAL DAILY
COMMUNITY

## 2024-06-25 NOTE — PROGRESS NOTES
"Patient ID: Maggie Emery is a 74 y.o. female    SERVICE TYPE: EVALUATION AND MANAGEMENT (greater than 50% of the time spent for supportive psychotherapy).      /70   Pulse 76   Ht 156.2 cm (61.5\")   Wt 75.8 kg (167 lb 3.2 oz)   SpO2 97%   BMI 31.08 kg/m²     ALLERGIES:  Ambien [zolpidem tartrate], Darvon [propoxyphene], Hydrocodone, and Ibuprofen    CC/ Focus of the visit: depression/ anxiety     HPI:     Reviewed therapist's note. \"Have had a few good days with their garden project\".   Overall has been less depressed, helping the daughter in [place] with the two grand daughters giving her purpose. Patient anxious to tell all about her positive experiences.    drinking 2-3 days out of the week, some better. He will not participate with the grand children's activities.  Patient to continue in her supportive psychotherapeutic effort, sees her therapist Yumiko Palencia at 4 to 6-week intervals.     Patient's had several surgery: Hiatal Hernia complicate by re occurrence.  Residual symptoms.     PFSH:home situation the same.     Review of Systems  No cardiopulmonary, GI or neurological complaints.    SUPPORTIVE PSYCHOTHERAPY: continuing efforts to promote the therapeutic alliance, address the patient’s issues, and strengthen self awareness, insights, and positive coping skills such as Exercising, listen to music, spending time in nature and utilizing resources.     Mental Status Exam  Appearance:  appropriate  Attitude toward clinician:  cooperative and agreeable   Speech:    Rate:  regular rate and rhythm   Volume: normal  Motor:  no abnormal movements   Mood:  Good  Affect:  euthymic  Thought Processes:  linear, logical, and goal directed  Thought Content:  Normal   Feeling Hopeless: absent  Suicidal Thoughts or Intent:  absent  Homicidal Thoughts:  absent  Perceptual Disturbance: no perceptual disturbance  Attention and Concentration:  good  Insight and Judgement:  good  Memory:  memory " appears to be intact    LABS: No results found for this or any previous visit (from the past 168 hour(s)).    MEDICATION ISSUES: Have discussed with the patient the medications Risks, Benefits, and Side effects including potential falls, possible impaired driving and  metabolic adversities among others. No medication side effects or related complaints today.     TREATMENT PLAN/GOALS: Continue supportive psychotherapy efforts and medications as indicated.     Current Outpatient Medications   Medication Sig Dispense Refill    Crestor 20 MG tablet Take 1 tablet by mouth Daily.      dilTIAZem (TIAZAC) 300 MG 24 hr capsule diltiazem  mg capsule,24 hr,extended release   TAKE ONE CAPSULE BY MOUTH DAILY      dilTIAZem CD (CARDIZEM CD) 300 MG 24 hr capsule Take 1 capsule by mouth Daily.      DULoxetine (CYMBALTA) 60 MG capsule Take 1 capsule by mouth Daily. 90 capsule 0    eszopiclone (Lunesta) 3 MG tablet Take 1 tablet by mouth At Night As Needed for Sleep. Take immediately before bedtime 30 tablet 2    fluticasone (FLONASE) 50 MCG/ACT nasal spray Flonase Allergy Relief 50 mcg/actuation nasal spray,suspension   2 sprays each nostril      Klor-Con M20 20 MEQ CR tablet Take 1 tablet by mouth Daily.      lansoprazole (PREVACID) 30 MG capsule Take 1 capsule by mouth Daily.      losartan-hydrochlorothiazide (HYZAAR) 100-25 MG per tablet Take 1 tablet by mouth Daily.      metoclopramide (REGLAN) 5 MG tablet Take 1 tablet by mouth 3 (Three) Times a Day.      Multiple Vitamins-Minerals (Systane ICaps AREDS2) capsule ICaps AREDS2      NIFEdipine XL (PROCARDIA XL) 60 MG 24 hr tablet Take 1 tablet by mouth Daily.      Potassium Chloride (KCL-20 PO) Take 20 tablets by mouth Daily.      rizatriptan MLT (MAXALT-MLT) 5 MG disintegrating tablet Take 1 tablet by mouth 1 (One) Time As Needed for Migraine. May repeat in 2 hours if needed      solifenacin (VESICARE) 5 MG tablet Daily.      Toprol XL 50 MG 24 hr tablet Take 1 tablet by  mouth.       No current facility-administered medications for this visit.       COLLATERAL PSYCHOTHERAPEUTIC INTERVENTION: Will be continuing with Yumiko hickman 4-6 weeks.   Wake up and get up  Assessment & Plan     Diagnoses and all orders for this visit:    1. MIRIAM (generalized anxiety disorder)  -     DULoxetine (CYMBALTA) 60 MG capsule; Take 1 capsule by mouth Daily.  Dispense: 90 capsule; Refill: 0    2. Dysthymic disorder  -     DULoxetine (CYMBALTA) 60 MG capsule; Take 1 capsule by mouth Daily.  Dispense: 90 capsule; Refill: 0    3. Primary insomnia  -     eszopiclone (Lunesta) 3 MG tablet; Take 1 tablet by mouth At Night As Needed for Sleep. Take immediately before bedtime  Dispense: 30 tablet; Refill: 2        Return in about 4 months (around 10/25/2024).           Patient knows to call if symptoms worsen or fail to improve between appointments.    I spent 30 minutes caring for Maggie on this date of service. This time includes time spent by me in the following activities: Patient evaluation, support psychotherapy, decisions, medications, and documentation.     Dictated utilizing [employer] dictation    MAKSIM Hill MD

## 2024-08-08 ENCOUNTER — OFFICE VISIT (OUTPATIENT)
Dept: OBSTETRICS AND GYNECOLOGY | Facility: CLINIC | Age: 74
End: 2024-08-08
Payer: MEDICARE

## 2024-08-08 VITALS
SYSTOLIC BLOOD PRESSURE: 130 MMHG | WEIGHT: 166 LBS | HEIGHT: 61 IN | DIASTOLIC BLOOD PRESSURE: 76 MMHG | BODY MASS INDEX: 31.34 KG/M2

## 2024-08-08 DIAGNOSIS — N95.2 VAGINAL ATROPHY: ICD-10-CM

## 2024-08-08 DIAGNOSIS — Z01.419 WOMEN'S ANNUAL ROUTINE GYNECOLOGICAL EXAMINATION: Primary | ICD-10-CM

## 2024-08-08 DIAGNOSIS — Z12.31 VISIT FOR SCREENING MAMMOGRAM: ICD-10-CM

## 2024-08-08 PROBLEM — Z79.890 HORMONE REPLACEMENT THERAPY (POSTMENOPAUSAL): Status: RESOLVED | Noted: 2017-03-21 | Resolved: 2024-08-08

## 2024-08-08 NOTE — PROGRESS NOTES
Subjective     Chief Complaint   Patient presents with    Gynecologic Exam     Annual             Maggie Liliana Emery is a 75 y.o. year old  presenting to be seen for her annual exam.      She is not sexually active.        GYN screening history:  Last mammogram: she reports her last mammogram was normal  Last colonoscopy: she reports her last colonoscopy was normal.    No Additional Complaints Reported  Health Maintenance for Postmenopausal Women  Menopause is a normal process in which your ability to get pregnant comes to an end. This process happens slowly over many months or years, usually between the ages of 48 and 55. Menopause is complete when you have missed your menstrual period for 12 months.  It is important to talk with your health care provider about some of the most common conditions that affect women after menopause (postmenopausal women). These include heart disease, cancer, and bone loss (osteoporosis). Adopting a healthy lifestyle and getting preventive care can help to promote your health and wellness. The actions you take can also lower your chances of developing some of these common conditions.  What are the signs and symptoms of menopause?  During menopause, you may have the following symptoms:  Hot flashes. These can be moderate or severe.  Night sweats.  Decrease in sex drive.  Mood swings.  Headaches.  Tiredness (fatigue).  Irritability.  Memory problems.  Problems falling asleep or staying asleep.  Talk with your health care provider about treatment options for your symptoms.  Do I need hormone replacement therapy?  Hormone replacement therapy is effective in treating symptoms that are caused by menopause, such as hot flashes and night sweats.  Hormone replacement carries certain risks, especially as you become older. If you are thinking about using estrogen or estrogen with progestin, discuss the benefits and risks with your health care provider.  How can I reduce my risk for  heart disease and stroke?  The risk of heart disease, heart attack, and stroke increases as you age. One of the causes may be a change in the body's hormones during menopause. This can affect how your body uses dietary fats, triglycerides, and cholesterol. Heart attack and stroke are medical emergencies. There are many things that you can do to help prevent heart disease and stroke.  Watch your blood pressure  High blood pressure causes heart disease and increases the risk of stroke. This is more likely to develop in people who have high blood pressure readings or are overweight.  Have your blood pressure checked:  Every 3-5 years if you are 18-39 years of age.  Every year if you are 40 years old or older.  Eat a healthy diet    Eat a diet that includes plenty of vegetables, fruits, low-fat dairy products, and lean protein.  Do not eat a lot of foods that are high in solid fats, added sugars, or sodium.  Get regular exercise  Get regular exercise. This is one of the most important things you can do for your health. Most adults should:  Try to exercise for at least 150 minutes each week. The exercise should increase your heart rate and make you sweat (moderate-intensity exercise).  Try to do strengthening exercises at least twice each week. Do these in addition to the moderate-intensity exercise.  Spend less time sitting. Even light physical activity can be beneficial.  Other tips  Work with your health care provider to achieve or maintain a healthy weight.  Do not use any products that contain nicotine or tobacco. These products include cigarettes, chewing tobacco, and vaping devices, such as e-cigarettes. If you need help quitting, ask your health care provider.  Know your numbers. Ask your health care provider to check your cholesterol and your blood sugar (glucose). Continue to have your blood tested as directed by your health care provider.  Do I need screening for cancer?  Depending on your health history and  family history, you may need to have cancer screenings at different stages of your life. This may include screening for:  Breast cancer.  Cervical cancer.  Lung cancer.  Colorectal cancer.  What is my risk for osteoporosis?  After menopause, you may be at increased risk for osteoporosis. Osteoporosis is a condition in which bone destruction happens more quickly than new bone creation. To help prevent osteoporosis or the bone fractures that can happen because of osteoporosis, you may take the following actions:  If you are 19-50 years old, get at least 1,000 mg of calcium and at least 600 international units (IU) of vitamin D per day.  If you are older than age 50 but younger than age 70, get at least 1,200 mg of calcium and at least 600 international units (IU) of vitamin D per day.  If you are older than age 70, get at least 1,200 mg of calcium and at least 800 international units (IU) of vitamin D per day.  Smoking and drinking excessive alcohol increase the risk of osteoporosis. Eat foods that are rich in calcium and vitamin D, and do weight-bearing exercises several times each week as directed by your health care provider.  How does menopause affect my mental health?  Depression may occur at any age, but it is more common as you become older. Common symptoms of depression include:  Feeling depressed.  Changes in sleep patterns.  Changes in appetite or eating patterns.  Feeling an overall lack of motivation or enjoyment of activities that you previously enjoyed.  Frequent crying spells.  Talk with your health care provider if you think that you are experiencing any of these symptoms.  General instructions  See your health care provider for regular wellness exams and vaccines. This may include:  Scheduling regular health, dental, and eye exams.  Getting and maintaining your vaccines. These include:  Influenza vaccine. Get this vaccine each year before the flu season begins.  Pneumonia vaccine.  Shingles  vaccine.  Tetanus, diphtheria, and pertussis (Tdap) booster vaccine.  Your health care provider may also recommend other immunizations.  Tell your health care provider if you have ever been abused or do not feel safe at home.  Summary  Menopause is a normal process in which your ability to get pregnant comes to an end.  This condition causes hot flashes, night sweats, decreased interest in sex, mood swings, headaches, or lack of sleep.  Treatment for this condition may include hormone replacement therapy.  Take actions to keep yourself healthy, including exercising regularly, eating a healthy diet, watching your weight, and checking your blood pressure and blood sugar levels.  Get screened for cancer and depression. Make sure that you are up to date with all your vaccines.  The following portions of the patient's history were reviewed and updated as appropriate:vital signs and She  has a past medical history of Anxiety (!), Depression, GERD (gastroesophageal reflux disease), Hyperlipidemia, Hypertension, and Migraines.  She does not have any pertinent problems on file.  She  has a past surgical history that includes Oophorectomy (Bilateral, 1996); Bladder repair; Tubal ligation; Colonoscopy; Breast biopsy (Right, 09/23/2016); Hysterectomy (1996); Cataract extraction, bilateral; Cataract extraction; Knee surgery (Left); knee arthroplasty, partial replacement (2020); Carpal tunnel release (2022); Abdominal surgery (1996. Hysterectomy); Joint replacement (Partial lt knee. 7/20); Fracture surgery; Hernia repair (Yes   1988. Umbilical); and Tonsillectomy (1057).  Her family history includes Anxiety disorder in her sister; Cancer in her father; Diabetes in her maternal grandmother and mother; Heart disease in her mother; Hypertension in her father, mother, and sister; Seizures in her sister.  She  reports that she has never smoked. She has never used smokeless tobacco. She reports current alcohol use of about 1.0 standard  drink of alcohol per week. She reports that she does not use drugs.  Current Outpatient Medications   Medication Sig Dispense Refill    Crestor 20 MG tablet Take 1 tablet by mouth Daily.      DULoxetine (CYMBALTA) 60 MG capsule Take 1 capsule by mouth Daily. 90 capsule 0    eszopiclone (Lunesta) 2 MG tablet Take 1 tablet by mouth At Night As Needed for Sleep. Take immediately before bedtime 30 tablet 2    fluticasone (FLONASE) 50 MCG/ACT nasal spray Flonase Allergy Relief 50 mcg/actuation nasal spray,suspension   2 sprays each nostril      Fluticasone Furoate-Vilanterol (BREO ELLIPTA) 200-25 MCG/ACT inhaler       losartan-hydrochlorothiazide (HYZAAR) 100-25 MG per tablet Take 1 tablet by mouth Daily.      Multiple Vitamins-Minerals (Systane ICaps AREDS2) capsule ICaps AREDS2      NIFEdipine XL (PROCARDIA XL) 60 MG 24 hr tablet Take 1 tablet by mouth Daily.      Potassium Chloride (KCL-20 PO) Take 20 tablets by mouth Daily.      potassium chloride (KLOR-CON M20) 20 MEQ CR tablet Take 1 tablet by mouth Daily.      rizatriptan MLT (MAXALT-MLT) 5 MG disintegrating tablet Take 1 tablet by mouth 1 (One) Time As Needed for Migraine. May repeat in 2 hours if needed      solifenacin (VESICARE) 5 MG tablet Daily.      Toprol XL 50 MG 24 hr tablet Take 1 tablet by mouth.       No current facility-administered medications for this visit.     Current Outpatient Medications on File Prior to Visit   Medication Sig    Crestor 20 MG tablet Take 1 tablet by mouth Daily.    fluticasone (FLONASE) 50 MCG/ACT nasal spray Flonase Allergy Relief 50 mcg/actuation nasal spray,suspension   2 sprays each nostril    losartan-hydrochlorothiazide (HYZAAR) 100-25 MG per tablet Take 1 tablet by mouth Daily.    Multiple Vitamins-Minerals (Systane ICaps AREDS2) capsule ICaps AREDS2    NIFEdipine XL (PROCARDIA XL) 60 MG 24 hr tablet Take 1 tablet by mouth Daily.    Potassium Chloride (KCL-20 PO) Take 20 tablets by mouth Daily.    rizatriptan MLT  "(MAXALT-MLT) 5 MG disintegrating tablet Take 1 tablet by mouth 1 (One) Time As Needed for Migraine. May repeat in 2 hours if needed    solifenacin (VESICARE) 5 MG tablet Daily.    Toprol XL 50 MG 24 hr tablet Take 1 tablet by mouth.     No current facility-administered medications on file prior to visit.     She is allergic to ambien [zolpidem tartrate], darvon [propoxyphene], hydrocodone, and ibuprofen..    Review of Systems  Pertinent items are noted in HPI.     Physical Exam    Objective     /76   Ht 156.2 cm (61.5\")   Wt 75.3 kg (166 lb)   Breastfeeding No   BMI 30.86 kg/m²         Lab Review   No data reviewed    Imaging  Mammogram report    Assessment & Plan     ASSESSMENT  1. Visit for screening mammogram    2. Vaginal atrophy        PLAN  Orders Placed This Encounter   Procedures    Mammo Screening Digital Tomosynthesis Bilateral With CAD     Standing Status:   Future     Number of Occurrences:   1     Expected Date:   8/7/2025     Expiration Date:   8/8/2025     Reason for Exam::   screen     Release to patient:   Routine Release [[id number]]     No orders of the defined types were placed in this encounter.        Follow up: 1 year(s)         This note was electronically signed.    Daniel Ziegler MD  April 30, 2025  "

## 2024-08-29 ENCOUNTER — TELEMEDICINE (OUTPATIENT)
Dept: PSYCHIATRY | Facility: CLINIC | Age: 74
End: 2024-08-29
Payer: MEDICARE

## 2024-08-29 DIAGNOSIS — F41.1 GAD (GENERALIZED ANXIETY DISORDER): Primary | ICD-10-CM

## 2024-08-29 DIAGNOSIS — F34.1 DYSTHYMIC DISORDER: ICD-10-CM

## 2024-08-29 NOTE — PLAN OF CARE
Problem: Anxiety  Goal: Patient will develop and implement behavioral and cognitive strategies to reduce anxiety and irrational fears.  Outcome: Ongoing, Progressing     Problem: Co-Dependency  Goal: Patient will demonstrate ability to set healthy boundaries and meet own needs within a relationship.  Outcome: Ongoing, Progressing

## 2024-08-29 NOTE — TREATMENT PLAN
Multi-Disciplinary Problems (from Behavioral Health Treatment Plan)      Active Problems       Problem: Anxiety  Start Date: 08/29/24      Problem Details: The patient self-scales this problem as a 4 with 10 being the worst.          Goal Priority Start Date Expected End Date End Date    Patient will develop and implement behavioral and cognitive strategies to reduce anxiety and irrational fears. -- 08/29/24 02/27/25 --    Goal Details: Progress toward goal:  The patient self-scales their progress related to this goal as a 4 with 10 being the worst.          Goal Intervention Frequency Start Date End Date    Help patient explore past emotional issues in relation to present anxiety. PRN 08/29/24 --    Intervention Details: Duration of treatment until discharged.          Goal Intervention Frequency Start Date End Date    Help patient develop an awareness of their cognitive and physical responses to anxiety. PRN 08/29/24 --    Intervention Details: Duration of treatment until discharged.                  Problem: Co-Dependency  Start Date: 08/29/24      Problem Details: The patient self-scales this problem as a 5 with 10 being the worst.          Goal Priority Start Date Expected End Date End Date    Patient will demonstrate ability to set healthy boundaries and meet own needs within a relationship. -- 08/29/24 02/27/25 --    Goal Details: Progress toward goal:  The patient self-scales their progress related to this goal as a 5 with 10 being the worst.          Goal Intervention Frequency Start Date End Date    Assist patient in identifying enabling behaviors and healthy boundaries within relationships. PRN 08/29/24 --    Intervention Details: Duration of treatment until discharged.                  Problem: Depression  Start Date: 08/29/24      Problem Details: The patient self-scales this problem as a 5 with 10 being the worst.          Goal Priority Start Date Expected End Date End Date    Patient will demonstrate  the ability to initiate new constructive life skills outside of sessions on a consistent basis. -- 08/29/24 02/27/25 --    Goal Details: Progress toward goal:  The patient self-scales their progress related to this goal as a 5 with 10 being the worst.          Goal Intervention Frequency Start Date End Date    Assist patient in setting attainable activities of daily living goals. PRN 08/29/24 --      Goal Intervention Frequency Start Date End Date    Provide education about depression PRN 08/29/24 --    Intervention Details: Duration of treatment until discharged.          Goal Intervention Frequency Start Date End Date    Assist patient in developing healthy coping strategies. PRN 08/29/24 --    Intervention Details: Duration of treatment until discharged.                          Reviewed By       Yumiko Alejandre LCSW 08/29/24 [id number]                   Patient gave verbal approval to continue the plan.  I have discussed and reviewed this treatment plan with the patient.  It has been printed for signatures.

## 2024-08-29 NOTE — PROGRESS NOTES
[facility]  Outpatient Telehealth Progress Note   Patient Status:  Established  Name:  Maggie Emery  :  1950  Date of Service: 2024  Time In: 15:04 EDT  Time Out: 15:59     HIPAA: You have chosen to receive care through a video visit today. This provider is located at home address in connection with the [facility]), [address], [place] [address] The Patient is seen remotely via telehealth at home ([address] [place] [address] [place] [address]) using [employer] platforms and is in a secure environment for this session. Patient was able to login but the audio was not working and the phone was used to complete the session. The patient's condition being diagnosed/treated is appropriate for telemedicine. The provider identified herself and credentials LCSW, LCADC. The patient and/or guardian consent(s) to be seen remotely, and when consent is given, she understand(s) consent allows for patient identifiable information to be sent to a third party as needed. Maggie can refuse to be seen remotely at any time. The electronic data is encrypted and password protected, and the patient has been advised of the potential risks to privacy, not withstanding such measures.    You have chosen to receive care through a telehealth visit.  Do you consent to use a video/audio connection for your medical care today? YES Verified the patients identify using Name and date of birth.    IDENTIFYING INFORMATION:  The patient is a 74 y.o. female who presents for  an outpatient follow-up appointment.      I, Maggiekisha Emery, hereby acknowledge that I have the right to discuss the assessment, potential risks, and benefits of any recommended treatment.      Chief Complaint: Patient reports problems with depression and anxiety.     Session Goal:  Patient with explore and process thoughts/feelings/coping skills to prevent deterioration of mood  and need for a higher level of care.    Subjective   HPI:  Patient arrived for session on time, clean and casually dressed with evidence of intoxication, withdrawal, or perceptual disturbance. Patient arrived as: age appropriate.  Patient indicates she is an open and willing participant in today's session.  Patient continues to report low mood, lethargy, fatigue, with feeling of worthlessness at times when adult children ignore her. She shares ongoing worry about her current situation that she feels powerless to change at this time, she worries about finances and what will happen next.  She was able to see her brother in [place] who has stage 3 of kidney failure and has to self cath too.  Patient shares that brother is ready to die, doesn't want a transplant and does not want a blood transfusion.  It was good to see him but still sad for her.  Was able to spend some time with sister in [place]  was enjoyable. Patient shares that she has continued to follow up with medical doctors, stomach is better but has bruising lately. Has plans to reach out to PCP for follow up.  Patient shares stress of husbands memory issues and his accusing the patient of calling people on his phone, or makes a mess,  then denies  it is exhausting but she is not reacting and is trying just to walk away from his claims. Shares that when he drinks he forgets what he does.  Has tried to go and watch granddaughters soccer game but it can be exhausting and the hot weather can be challenging for her.   Depression Low mood for > 2 weeks, Loss of Interest, Feelings of guilt/worthlessness, and Low energy  Generalized Anxiety  Excess Worry, Restless/Edgy, Easily fatigued, and Muscle tension. Onset of symptoms was vague.  Symptoms are associated with financial burdens and lack of support.  Symptoms are aggravated by anxiety, lonely, sadness, stress, and weight management.   Symptoms improve with medication management, therapy, and personal self-care (wellness)  Current rates severity of symptoms, on a scale of 1-10 (10 is the most severe) 4 Context Family and social history was reviewed  Quality been intermittent without a consistent pattern.      Substance Use rarely 1 drink.  Recent labs:    Last Urine Toxicity           No data to display              Objective    Medical History: Areas Reviewed: The following portions of the patient's history were reviewed and updated as appropriate: allergies, current medications, past family history, past medical history, past social history, past surgical history, and problem list.    Vitals:  Weight:  No Weight Documented This Encounter  Height: No Height Documented This Encounter  BMI:  There is no height or weight on file to calculate BMI.  Education:  The benefits of a healthy diet and exercise were discussed with patient, especially the positive effects they have on mental health. Patient encouraged to consider lifestyle modification regarding  diet and exercise patterns to maximize results of mental health treatment.    Medication Review:    Current Outpatient Medications:     Crestor 20 MG tablet, Take 1 tablet by mouth Daily., Disp: , Rfl:     DULoxetine (CYMBALTA) 60 MG capsule, Take 1 capsule by mouth Daily., Disp: 90 capsule, Rfl: 0    eszopiclone (Lunesta) 3 MG tablet, Take 1 tablet by mouth At Night As Needed for Sleep. Take immediately before bedtime, Disp: 30 tablet, Rfl: 2    fluticasone (FLONASE) 50 MCG/ACT nasal spray, Flonase Allergy Relief 50 mcg/actuation nasal spray,suspension  2 sprays each nostril, Disp: , Rfl:     losartan-hydrochlorothiazide (HYZAAR) 100-25 MG per tablet, Take 1 tablet by mouth Daily., Disp: , Rfl:     metoclopramide (REGLAN) 5 MG tablet, Take 1 tablet by mouth 3 (Three) Times a Day., Disp: , Rfl:     Multiple Vitamins-Minerals (Systane ICaps AREDS2) capsule, ICaps AREDS2, Disp: , Rfl:     NIFEdipine XL (PROCARDIA XL) 60 MG 24 hr tablet, Take 1 tablet by mouth Daily., Disp: , Rfl:      Potassium Chloride (KCL-20 PO), Take 20 tablets by mouth Daily., Disp: , Rfl:     rizatriptan MLT (MAXALT-MLT) 5 MG disintegrating tablet, Take 1 tablet by mouth 1 (One) Time As Needed for Migraine. May repeat in 2 hours if needed, Disp: , Rfl:     solifenacin (VESICARE) 5 MG tablet, Daily., Disp: , Rfl:     Toprol XL 50 MG 24 hr tablet, Take 1 tablet by mouth., Disp: , Rfl:      Medication Compliance:  compliance with medication regimen;     Assessment & Plan    Trauma Assessment:  Patient denies having been hit, slapped, kicked or otherwise physically hurt by others since last visit as well as having been forced to engage in any unwanted sexual acts since last visit.       Risk Assessment:  Patient adamantly and convincingly denies having SI/HI with or without intent, plans, or means. Patient denies having Hallucinations/Illusions and Delusions.  Patient  denies self-harming behaviors      Risk Level: History obtained from: patient and chart review.  Due to historical context and reported clinical markers, it appears patient meets criteria for LOW RISK to engage in self-harm or harm to others.  It is recommended Maggie be evaluated and assessed each contact for intent, plan, means and/or lethality each contact.    Behavior Health Review Of Systems:  Psychiatric/Behavioral: Negative for agitation, behavioral problems, decreased concentration,  hallucinations, self-injury, sleep disturbance, suicidal ideas, negative for hyperactivity. Positive for dysphoric mood and stress. The patient is nervous/anxious.    Pertinent items are noted in HPI.    MENTAL STATUS EXAM   General Appearance:  Cleanly groomed and dressed  Eye Contact:  Good eye contact  Attitude:  Cooperative  Motor Activity:  Other  Other Comment:  Video visit  Speech:  Normal rate, tone, volume  Language:  Spontaneous  Mood and affect:  Normal, pleasant  Hopelessness:  Denies  Loneliness: 4  Thought Process:  Logical, goal-directed and  linear  Associations/ Thought Content:  No delusions  Hallucinations:  None  Suicidal Ideations:  Not present  Homicidal Ideation:  Not present  Sensorium:  Alert  Orientation:  Person, place, time and situation  Immediate Recall, Recent, and Remote Memory:  Intact  Attention Span/ Concentration:  Easily distracted  Fund of Knowledge:  Appropriate for age and educational level  Insight:  Fair  Judgement:  Good  Reliability:  Good  Impulse Control:  Fair       Treatment plan status:  Active and Quarterly Review 08/29/24   Treatment plan progress: Progressing  Prognosis: Fair with Ongoing Treatment   Functional Status: Moderate impairment   Disposition:   Patient does not appear to be malingering.     Session Summary: Therapist met individually with patient this date. Discussed progress in treatment and any needs/concerns.   Encouraged the patient to discuss/vent their feelings, frustrations, and fears concerning their ongoing issues and validated their feelings.    Assisted patient in processing above session content; acknowledged and normalized patient’s thoughts, feelings, and concerns.Processing current living situation, managing stress and plan for safety if ever needed.  Therapist applied CBT/REBT, Mindfulness Training, and Review of Treatment Plan and encouraged the patient to use positive coping skills such as Drawing/Art, Listen to music, Playing with a pet, Reframe the way you are thinking about the problem, Establish healthy boundaries , Walk away (leave a situation that is causing you stress), Ask for a break, Take deep breaths, Keep calm by thinking, and Utilize resources/coping skills.    Allowed patient to freely discuss issues without interruption or judgment. Provided safe, confidential environment to facilitate the development of positive therapeutic relationship and encourage open, honest communication.     Visit Diagnosis/Plan    ICD-10-CM ICD-9-CM   1. MIRIAM (generalized anxiety disorder)  F41.1  300.02   2. Dysthymic disorder  F34.1 300.4     Clinical Maneuvering:  All treatment options available at [facility] explored and documented.  The benefits of a healthy diet and exercise were discussed with patient, especially the positive effects they have on mental health. Patient encouraged to consider lifestyle modification regarding  diet and exercise patterns to maximize results of mental health treatment.  Reviewed previous available documentation  Reviewed most recent available labs     Justification for therapy: Patient continues to struggle with a chronic/pervasive mental illness which continues to cause impairment in at least two important areas of functioning.  Patient appear(s) to maintain relative stability as compared to the  baseline measure.  Patient can reasonably be expected to continue to benefit from treatment and would likely be at increased risk for decompensation if treatment were stopped.  Patient endorses a positive benefit from therapy and appears to meet outpatient level of care. Patient expresses gratitude and reports she had a positive experience today.    Recommended Referrals: Psychiatrist/APRN and Medical Provider (PCP)    Follow Up:  Return in about 4 weeks, or earlier if symptoms worsen or fail to improve for next follow up visit. [phone]      The patient understands that treatment is conditional on adhering to all Behavioral Health Outpatient Policy and Procedures.  The patient understands that providers/clinic has discretion to dismissed them from care if these are breached and a recommendation for further care will be made at time of discharge.    Future Appointments         Provider Department Center    10/1/2024 12:00 PM Yumiko Alejandre LCSW [facility]    10/14/2024 1:20 PM (Arrive by 1:00 PM) TAY BR SCREENING [facility]    10/24/2024 1:00 PM Haim Hill  MD Dariel [facility]    8/11/2025 2:00 PM Daniel Ziegler MD [facility]            This document electronically signed by Yumiko Miles LCSW, [facility] August 29, 2024 16:02 EDT    DISCLOSURE: This document is intended for medical expert use only. Reading of this document by patients and/or patient's family without participating in medical staff guidance may result in misinterpretation and unintended morbidity. Any interpretation of such data is the responsibility of the patient and/or family member responsible for the patient in concert with their primary or specialist providers, and NOT to be left for sources of online searches such as [employer] or similar queries. Relying on these approaches to knowledge may result in misinterpretation, misguided goals of care and even death should patients or family members try recommendations outside of the realm of professional medical care in a supervised way.    Oj Disclaimer:  Please note that portions of this documentation may have been completed with a voice recognition program.  Efforts were made to edit this dictation, but occasional words may have been mistranscribed.

## 2024-09-13 DIAGNOSIS — F41.1 GAD (GENERALIZED ANXIETY DISORDER): ICD-10-CM

## 2024-09-13 DIAGNOSIS — F34.1 DYSTHYMIC DISORDER: ICD-10-CM

## 2024-09-13 DIAGNOSIS — F51.01 PRIMARY INSOMNIA: ICD-10-CM

## 2024-09-15 RX ORDER — DULOXETIN HYDROCHLORIDE 60 MG/1
60 CAPSULE, DELAYED RELEASE ORAL DAILY
Qty: 90 CAPSULE | Refills: 0 | Status: SHIPPED | OUTPATIENT
Start: 2024-09-15

## 2024-09-15 RX ORDER — ESZOPICLONE 3 MG/1
3 TABLET, FILM COATED ORAL NIGHTLY PRN
Qty: 30 TABLET | Refills: 0 | Status: SHIPPED | OUTPATIENT
Start: 2024-09-15 | End: 2025-09-15

## 2024-09-30 LAB
NCCN CRITERIA FLAG: NORMAL
TYRER CUZICK SCORE: 3.7

## 2024-10-01 ENCOUNTER — TELEMEDICINE (OUTPATIENT)
Dept: PSYCHIATRY | Facility: CLINIC | Age: 74
End: 2024-10-01
Payer: MEDICARE

## 2024-10-01 DIAGNOSIS — F34.1 DYSTHYMIC DISORDER: ICD-10-CM

## 2024-10-01 DIAGNOSIS — F41.1 GAD (GENERALIZED ANXIETY DISORDER): Primary | ICD-10-CM

## 2024-10-01 PROCEDURE — 1160F RVW MEDS BY RX/DR IN RCRD: CPT | Performed by: SOCIAL WORKER

## 2024-10-01 PROCEDURE — 1159F MED LIST DOCD IN RCRD: CPT | Performed by: SOCIAL WORKER

## 2024-10-01 PROCEDURE — 90837 PSYTX W PT 60 MINUTES: CPT | Performed by: SOCIAL WORKER
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
## 2024-10-10 ENCOUNTER — OFFICE VISIT (OUTPATIENT)
Dept: PSYCHIATRY | Facility: CLINIC | Age: 74
End: 2024-10-10
Payer: MEDICARE

## 2024-10-10 VITALS
DIASTOLIC BLOOD PRESSURE: 72 MMHG | BODY MASS INDEX: 31.87 KG/M2 | SYSTOLIC BLOOD PRESSURE: 120 MMHG | HEIGHT: 61 IN | HEART RATE: 73 BPM | OXYGEN SATURATION: 96 % | WEIGHT: 168.8 LBS

## 2024-10-10 DIAGNOSIS — F41.1 GAD (GENERALIZED ANXIETY DISORDER): ICD-10-CM

## 2024-10-10 DIAGNOSIS — F51.01 PRIMARY INSOMNIA: ICD-10-CM

## 2024-10-10 DIAGNOSIS — F34.1 DYSTHYMIC DISORDER: Primary | ICD-10-CM

## 2024-10-10 PROCEDURE — 3078F DIAST BP <80 MM HG: CPT | Performed by: PSYCHIATRY & NEUROLOGY

## 2024-10-10 PROCEDURE — 3074F SYST BP LT 130 MM HG: CPT | Performed by: PSYCHIATRY & NEUROLOGY

## 2024-10-10 RX ORDER — POTASSIUM CHLORIDE 1500 MG/1
1 TABLET, EXTENDED RELEASE ORAL DAILY
COMMUNITY
Start: 2024-09-11

## 2024-10-10 RX ORDER — ESZOPICLONE 2 MG/1
2 TABLET, FILM COATED ORAL NIGHTLY PRN
Qty: 30 TABLET | Refills: 1 | Status: SHIPPED | OUTPATIENT
Start: 2024-10-10 | End: 2025-10-10

## 2024-10-10 RX ORDER — FLUTICASONE FUROATE AND VILANTEROL 200; 25 UG/1; UG/1
POWDER RESPIRATORY (INHALATION)
COMMUNITY
Start: 2024-08-29

## 2024-10-10 NOTE — PROGRESS NOTES
"Patient ID: Maggie Emery is a 74 y.o. female    SERVICE TYPE: EVALUATION AND MANAGEMENT (greater than 50% of the time spent for supportive psychotherapy).      /72   Pulse 73   Ht 156.2 cm (61.5\")   Wt 76.6 kg (168 lb 12.8 oz)   SpO2 96%   BMI 31.38 kg/m²     ALLERGIES:  Ambien [zolpidem tartrate], Darvon [propoxyphene], Hydrocodone, and Ibuprofen    CC/ Focus of the visit: Dysthymic disorder    HPI:     Patient states her primary difficulty has been with chronic fatigue not necessarily associated with her mood or anxiety.  She does continue to experience low-grade dysphoria and discontent with her home situation as has been previously described.  She has not experienced an acute superimposed episode of clinical depression.  She does lack interest and enthusiasm she attributes to fatigue and is no longer enthusiastic about past coping activities such as sewing or exercise.  She states she will try to expand her walk from 30 minutes several times a week and is seriously considering replacing her  dog who used to be her incentive for taking daily extended walks.  She did state that she was sleeping better, perhaps 5 to 6 hours per night and is willing to reduce the dose of the Lunesta she is taking so many years.  She is continuing her psychotherapeutic effort with Yumiko Palencia LCSW which has been helpful, did review the notes from her last clinic visit .    PFSH: Her home situation has not changed.  Patient describes how that she did recently enjoy a visit with her brother and [place], the  has chronic renal disease with increasing health problems.    Review of Systems  No cardiopulmonary, GI or neurological complaints.    SUPPORTIVE PSYCHOTHERAPY: continuing efforts to promote the therapeutic alliance, address the patient’s issues, and strengthen self awareness, insights, and positive coping skills such as Exercising, listen to music, spending time in nature and " utilizing resources.     Mental Status Exam  Appearance:  appropriate  Attitude toward clinician:  cooperative and agreeable   Speech:    Rate:  regular rate and rhythm   Volume: normal  Motor:  no abnormal movements   Mood:  Good  Affect:  euthymic  Thought Processes:  linear, logical, and goal directed  Thought Content:  Normal   Feeling Hopeless: absent  Suicidal Thoughts or Intent:  absent  Homicidal Thoughts:  absent  Perceptual Disturbance: no perceptual disturbance  Attention and Concentration:  good  Insight and Judgement:  good  Memory:  memory appears to be intact    LABS: No results found for this or any previous visit (from the past 168 hour(s)).    MEDICATION ISSUES: Have discussed with the patient the medications Risks, Benefits, and Side effects including potential falls, possible impaired driving and  metabolic adversities among others. No medication side effects or related complaints today.     TREATMENT PLAN/GOALS: Continue supportive psychotherapy efforts and medications as indicated.     Current Outpatient Medications   Medication Sig Dispense Refill    Crestor 20 MG tablet Take 1 tablet by mouth Daily.      DULoxetine (CYMBALTA) 60 MG capsule Take 1 capsule by mouth Daily. 90 capsule 0    fluticasone (FLONASE) 50 MCG/ACT nasal spray Flonase Allergy Relief 50 mcg/actuation nasal spray,suspension   2 sprays each nostril      Fluticasone Furoate-Vilanterol (BREO ELLIPTA) 200-25 MCG/ACT inhaler       losartan-hydrochlorothiazide (HYZAAR) 100-25 MG per tablet Take 1 tablet by mouth Daily.      metoclopramide (REGLAN) 5 MG tablet Take 1 tablet by mouth 3 (Three) Times a Day.      Multiple Vitamins-Minerals (Systane ICaps AREDS2) capsule ICaps AREDS2      NIFEdipine XL (PROCARDIA XL) 60 MG 24 hr tablet Take 1 tablet by mouth Daily.      Potassium Chloride (KCL-20 PO) Take 20 tablets by mouth Daily.      potassium chloride (KLOR-CON M20) 20 MEQ CR tablet Take 1 tablet by mouth Daily.      rizatriptan  MLT (MAXALT-MLT) 5 MG disintegrating tablet Take 1 tablet by mouth 1 (One) Time As Needed for Migraine. May repeat in 2 hours if needed      solifenacin (VESICARE) 5 MG tablet Daily.      Toprol XL 50 MG 24 hr tablet Take 1 tablet by mouth.      eszopiclone (Lunesta) 2 MG tablet Take 1 tablet by mouth At Night As Needed for Sleep. Take immediately before bedtime 30 tablet 1     No current facility-administered medications for this visit.       COLLATERAL PSYCHOTHERAPEUTIC INTERVENTION: patient is continuing with Yumiko Palencia [facility]    RISK:  low    Assessment & Plan     Diagnoses and all orders for this visit:    1. Dysthymic disorder (Primary)  Patient continuing the duloxetine 60 mg daily having a supply for another 2 months plus the ongoing psychotherapeutic effort.    2. MIRIAM (generalized anxiety disorder)  Patient continuing the duloxetine 60 mg daily having a supply for another 2 months plus the ongoing psychotherapeutic effort.    3. Primary insomnia  -     eszopiclone (Lunesta) 2 MG tablet; Take 1 tablet by mouth At Night As Needed for Sleep. Take immediately before bedtime  Dispense: 30 tablet; Refill: 1        Return in about 2 months (around 12/10/2024).           Patient knows to call if symptoms worsen or fail to improve between appointments.    I spent 30 minutes caring for Maggie on this date of service. This time includes time spent by me in the following activities: Patient evaluation, support psychotherapy, decisions, medications, and documentation.     Dictated utilizing [employer] dictation    MAKSIM Hill MD

## 2024-10-14 ENCOUNTER — HOSPITAL ENCOUNTER (OUTPATIENT)
Dept: MAMMOGRAPHY | Facility: HOSPITAL | Age: 74
Discharge: HOME | End: 2024-10-14
Admitting: OBSTETRICS & GYNECOLOGY
Payer: MEDICARE

## 2024-10-14 DIAGNOSIS — Z12.31 VISIT FOR SCREENING MAMMOGRAM: ICD-10-CM

## 2024-10-14 PROCEDURE — 77063 BREAST TOMOSYNTHESIS BI: CPT

## 2024-10-14 PROCEDURE — 77067 SCR MAMMO BI INCL CAD: CPT

## 2024-11-04 ENCOUNTER — TELEMEDICINE (OUTPATIENT)
Dept: PSYCHIATRY | Facility: CLINIC | Age: 74
End: 2024-11-04
Payer: MEDICARE

## 2024-11-04 DIAGNOSIS — F41.1 GAD (GENERALIZED ANXIETY DISORDER): ICD-10-CM

## 2024-11-04 DIAGNOSIS — F34.1 DYSTHYMIC DISORDER: Primary | ICD-10-CM

## 2024-11-04 PROCEDURE — 1159F MED LIST DOCD IN RCRD: CPT | Performed by: SOCIAL WORKER

## 2024-11-04 PROCEDURE — 1160F RVW MEDS BY RX/DR IN RCRD: CPT | Performed by: SOCIAL WORKER

## 2024-11-04 PROCEDURE — 90837 PSYTX W PT 60 MINUTES: CPT | Performed by: SOCIAL WORKER
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
## 2024-12-05 ENCOUNTER — OFFICE VISIT (OUTPATIENT)
Dept: PSYCHIATRY | Facility: CLINIC | Age: 74
End: 2024-12-05
Payer: MEDICARE

## 2024-12-05 VITALS
WEIGHT: 164.5 LBS | BODY MASS INDEX: 31.06 KG/M2 | DIASTOLIC BLOOD PRESSURE: 80 MMHG | OXYGEN SATURATION: 97 % | HEIGHT: 61 IN | HEART RATE: 75 BPM | SYSTOLIC BLOOD PRESSURE: 124 MMHG

## 2024-12-05 DIAGNOSIS — F41.1 GAD (GENERALIZED ANXIETY DISORDER): ICD-10-CM

## 2024-12-05 DIAGNOSIS — F34.1 DYSTHYMIC DISORDER: Primary | ICD-10-CM

## 2024-12-05 DIAGNOSIS — F51.01 PRIMARY INSOMNIA: ICD-10-CM

## 2024-12-05 RX ORDER — ESZOPICLONE 2 MG/1
2 TABLET, FILM COATED ORAL NIGHTLY PRN
Qty: 30 TABLET | Refills: 1 | Status: SHIPPED | OUTPATIENT
Start: 2024-12-05 | End: 2024-12-05 | Stop reason: SDUPTHER

## 2024-12-05 RX ORDER — DULOXETIN HYDROCHLORIDE 60 MG/1
60 CAPSULE, DELAYED RELEASE ORAL DAILY
Qty: 90 CAPSULE | Refills: 0 | Status: SHIPPED | OUTPATIENT
Start: 2024-12-05

## 2024-12-05 RX ORDER — DULOXETIN HYDROCHLORIDE 60 MG/1
60 CAPSULE, DELAYED RELEASE ORAL DAILY
Qty: 90 CAPSULE | Refills: 0 | Status: SHIPPED | OUTPATIENT
Start: 2024-12-05 | End: 2024-12-05 | Stop reason: SDUPTHER

## 2024-12-05 RX ORDER — ESZOPICLONE 2 MG/1
2 TABLET, FILM COATED ORAL NIGHTLY PRN
Qty: 30 TABLET | Refills: 2 | Status: SHIPPED | OUTPATIENT
Start: 2024-12-05 | End: 2025-12-05

## 2024-12-05 NOTE — PROGRESS NOTES
"Patient ID: Maggie Emery is a 74 y.o. female    SERVICE TYPE: EVALUATION AND MANAGEMENT (greater than 50% of the time spent for supportive psychotherapy).      /80   Pulse 75   Ht 156.2 cm (61.5\")   Wt 74.6 kg (164 lb 8 oz)   SpO2 97%   BMI 30.58 kg/m²     ALLERGIES:  Ambien [zolpidem tartrate], Darvon [propoxyphene], Hydrocodone, and Ibuprofen    CC/ Focus of the visit: depression/ anxiety    HPI: Reviewed therapist note from November 4th. Patient realizing that her therapy is key to her treatment effort.  Cross-sectional the patient's actually less negative, less depressive in nature as she described her daughter's family turmoil, the daughter that lives in [place].  Patient has been concerned and trying to help but seems to have a appropriate grasp on her limitations and the boundaries involved.  Much of this can probably attributable to her ongoing therapy.  In addition, she is looking forward to helping the daughter that lives in [place] with childcare in the near future as well as during the holidays.  Her home situation has not changed.  She reports sleeping well with the medication.  Her chronic dysphoric status remains.    PFSH: As noted patient's home situation has not changed, she is unable to exercise as she has in the past due to knee pathology and lack of endurance.    Review of Systems  No cardiopulmonary, GI or neurological complaints.    SUPPORTIVE PSYCHOTHERAPY: continuing efforts to promote the therapeutic alliance, address the patient’s issues, and strengthen self awareness, insights, and positive coping skills such as Exercising, listen to music, spending time in nature and utilizing resources.     Mental Status Exam  Appearance:  appropriate  Attitude toward clinician:  cooperative and agreeable   Speech:    Rate:  regular rate and rhythm   Volume: normal  Motor:  no abnormal movements   Mood: Much less dysphoric in nature   affect:  euthymic today  Thought Processes:  " linear, logical, and goal directed  Thought Content:  Normal   Feeling Hopeless: absent  Suicidal Thoughts or Intent:  absent  Homicidal Thoughts:  absent  Perceptual Disturbance: no perceptual disturbance  Attention and Concentration:  good  Insight and Judgement:  good  Memory:  memory appears to be intact    LABS: No results found for this or any previous visit (from the past week).    MEDICATION ISSUES: Have discussed with the patient the medications Risks, Benefits, and Side effects including potential falls, possible impaired driving and  metabolic adversities among others. No medication side effects or related complaints today.     TREATMENT PLAN/GOALS: Continue supportive psychotherapy efforts and medications as indicated.     Current Outpatient Medications   Medication Sig Dispense Refill    Crestor 20 MG tablet Take 1 tablet by mouth Daily.      DULoxetine (CYMBALTA) 60 MG capsule Take 1 capsule by mouth Daily. 90 capsule 0    eszopiclone (Lunesta) 2 MG tablet Take 1 tablet by mouth At Night As Needed for Sleep. Take immediately before bedtime 30 tablet 2    fluticasone (FLONASE) 50 MCG/ACT nasal spray Flonase Allergy Relief 50 mcg/actuation nasal spray,suspension   2 sprays each nostril      Fluticasone Furoate-Vilanterol (BREO ELLIPTA) 200-25 MCG/ACT inhaler       losartan-hydrochlorothiazide (HYZAAR) 100-25 MG per tablet Take 1 tablet by mouth Daily.      Multiple Vitamins-Minerals (Systane ICaps AREDS2) capsule ICaps AREDS2      NIFEdipine XL (PROCARDIA XL) 60 MG 24 hr tablet Take 1 tablet by mouth Daily.      Potassium Chloride (KCL-20 PO) Take 20 tablets by mouth Daily.      potassium chloride (KLOR-CON M20) 20 MEQ CR tablet Take 1 tablet by mouth Daily.      rizatriptan MLT (MAXALT-MLT) 5 MG disintegrating tablet Take 1 tablet by mouth 1 (One) Time As Needed for Migraine. May repeat in 2 hours if needed      solifenacin (VESICARE) 5 MG tablet Daily.      Toprol XL 50 MG 24 hr tablet Take 1 tablet by  mouth.      metoclopramide (REGLAN) 5 MG tablet Take 1 tablet by mouth 3 (Three) Times a Day. (Patient not taking: Reported on 12/5/2024)       No current facility-administered medications for this visit.       COLLATERAL PSYCHOTHERAPEUTIC INTERVENTION: patient is continuing with Yumiko Palencia LCSW.    RISK:  low    Assessment & Plan     Diagnoses and all orders for this visit:    1. Dysthymic disorder (Primary)  -     DULoxetine (CYMBALTA) 60 MG capsule; Take 1 capsule by mouth Daily.  Dispense: 90 capsule; Refill: 0    2. MIRIAM (generalized anxiety disorder)  -     DULoxetine (CYMBALTA) 60 MG capsule; Take 1 capsule by mouth Daily.  Dispense: 90 capsule; Refill: 0    3. Primary insomnia  -     eszopiclone (Lunesta) 2 MG tablet; Take 1 tablet by mouth At Night As Needed for Sleep. Take immediately before bedtime  Dispense: 30 tablet; Refill: 2        Return in about 3 months (around 3/5/2025).           Patient knows to call if symptoms worsen or fail to improve between appointments.    I spent 30 minutes caring for Maggie on this date of service. This time includes time spent by me in the following activities: Patient evaluation, support psychotherapy, decisions, medications, and documentation.     Dictated utilizing [employer] dictation    MAKSIM Hill MD

## 2025-01-06 ENCOUNTER — TELEMEDICINE (OUTPATIENT)
Dept: PSYCHIATRY | Facility: CLINIC | Age: 75
End: 2025-01-06
Payer: MEDICARE

## 2025-01-06 DIAGNOSIS — F34.1 DYSTHYMIC DISORDER: ICD-10-CM

## 2025-01-06 DIAGNOSIS — F41.1 GAD (GENERALIZED ANXIETY DISORDER): Primary | ICD-10-CM

## 2025-01-06 PROCEDURE — 1160F RVW MEDS BY RX/DR IN RCRD: CPT | Performed by: SOCIAL WORKER

## 2025-01-06 PROCEDURE — 1159F MED LIST DOCD IN RCRD: CPT | Performed by: SOCIAL WORKER

## 2025-01-06 PROCEDURE — 90837 PSYTX W PT 60 MINUTES: CPT | Performed by: SOCIAL WORKER
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
## 2025-01-06 NOTE — PROGRESS NOTES
[facility]  Outpatient Telehealth Progress Note   Patient Status:  Established  Name:  Maggie Emery  :  1950  Date of Service: 2025  Time In: 11:49 EST  Time Out: 12:48     HIPAA: You have chosen to receive care through a video visit today. This provider is located at home address in connection with the [facility] (through [facility]), [address], [place] [address] The Patient is seen remotely via telehealth at home ([address] [place] [address] [place] [address]) using [employer] platforms and is in a secure environment for this session. The patient's condition being diagnosed/treated is appropriate for telemedicine. The provider identified herself and credentials LCSW, GOPALDC.  The patient and/or guardian consent(s) to be seen remotely, and when consent is given, she understand(s) consent allows for patient identifiable information to be sent to a third party as needed. Maggie can refuse to be seen remotely at any time. The electronic data is encrypted and password protected, and the patient has been advised of the potential risks to privacy, not withstanding such measures.  You have chosen to receive care through a telehealth visit.  The patient has signed the video visit consent form.  The visit included audio and video interaction. Technical issues occurred during this visit.-Audio was choppy-difficult to hear  Verified the patients identify using Name and date of birth. Patient states there are no changes the their insurance.     IDENTIFYING INFORMATION:  The patient is a 75 y.o. female who presents for  an outpatient follow-up appointment.      I, Maggie Dhillonbemarcus Emery, hereby acknowledge that I have the right to discuss the assessment, potential risks, and benefits of any recommended treatment.    Chief Complaint: Patient reports problems with depression and anxiety.     Session Goal:  Patient with explore and process  thoughts/feelings/coping skills to prevent deterioration of mood and need for a higher level of care.    Subjective   HPI:  Patient arrived for session on time, clean and casually dressed without evidence of intoxication, withdrawal, or perceptual disturbance. Patient arrived as: age appropriate.  Patient indicates she is an open and willing participant in today's session. Patient shares that she went and saw her grandson play basketball & broke his hand. Patient concerned but recognizes the limitations-boundaries remain. Conflict continues between grandson & his parents. Spoke to son-in-law who remains adamant that grandson needs to apologize first. Tried to facilitate communication between them to of them. Walk 1/3 mile then exhausted, Thinking about going to the gym to address this.  Patient expresses being lonely and not having adult friends.      Depression Decreased/Increased sleep, Feelings of guilt/worthlessness, Low energy, and Impaired concentration  Generalized Anxiety  Excess Worry, Easily fatigued, Muscle tension, Decreased sleep, and Decreased concentration. Onset of symptoms was vague.  Symptoms are associated with relationship problem with unchanged since last visit, financial burdens, and lack of support.  Symptoms are aggravated by anxiety, lonely, sadness, stress, and weight management.   Symptoms improve with medication management, therapy, and personal self-care (wellness) Current rates severity of symptoms, on a scale of 1-10 (10 is the most severe) 5 Context Family and social history was reviewed  Quality been intermittent without a consistent pattern.      Somatic Symptoms:  excessive fatigue, low energy, no stamina    Substance Use:endorses Alcohol: occasional (explain: 1 drink monthly.)     Recent labs:    Last Urine Toxicity           No data to display              Objective    Medical History: Areas Reviewed: The following portions of the patient's history were reviewed and updated as  appropriate: allergies, current medications, past family history, past medical history, past social history, past surgical history, and problem list.    Vitals:  Weight:  No Weight Documented This Encounter  Height: No Height Documented This Encounter  BMI:  There is no height or weight on file to calculate BMI.  Education:  The benefits of a healthy diet and exercise were discussed with patient, especially the positive effects they have on mental health. Patient encouraged to consider lifestyle modification regarding  diet and exercise patterns to maximize results of mental health treatment.    Medication Review:    Current Outpatient Medications:     Crestor 20 MG tablet, Take 1 tablet by mouth Daily., Disp: , Rfl:     DULoxetine (CYMBALTA) 60 MG capsule, Take 1 capsule by mouth Daily., Disp: 90 capsule, Rfl: 0    eszopiclone (Lunesta) 2 MG tablet, Take 1 tablet by mouth At Night As Needed for Sleep. Take immediately before bedtime, Disp: 30 tablet, Rfl: 2    fluticasone (FLONASE) 50 MCG/ACT nasal spray, Flonase Allergy Relief 50 mcg/actuation nasal spray,suspension  2 sprays each nostril, Disp: , Rfl:     Fluticasone Furoate-Vilanterol (BREO ELLIPTA) 200-25 MCG/ACT inhaler, , Disp: , Rfl:     losartan-hydrochlorothiazide (HYZAAR) 100-25 MG per tablet, Take 1 tablet by mouth Daily., Disp: , Rfl:     Multiple Vitamins-Minerals (Systane ICaps AREDS2) capsule, ICaps AREDS2, Disp: , Rfl:     NIFEdipine XL (PROCARDIA XL) 60 MG 24 hr tablet, Take 1 tablet by mouth Daily., Disp: , Rfl:     Potassium Chloride (KCL-20 PO), Take 20 tablets by mouth Daily., Disp: , Rfl:     potassium chloride (KLOR-CON M20) 20 MEQ CR tablet, Take 1 tablet by mouth Daily., Disp: , Rfl:     rizatriptan MLT (MAXALT-MLT) 5 MG disintegrating tablet, Take 1 tablet by mouth 1 (One) Time As Needed for Migraine. May repeat in 2 hours if needed, Disp: , Rfl:     solifenacin (VESICARE) 5 MG tablet, Daily., Disp: , Rfl:     Toprol XL 50 MG 24 hr tablet,  Take 1 tablet by mouth., Disp: , Rfl:      Medication Compliance:  compliance with medication regimen; Side Effects reported:  no.    Assessment & Plan    Trauma Assessment:  Patient denies having been hit, slapped, kicked or otherwise physically hurt by others since last visit as well as having been forced to engage in any unwanted sexual acts since last visit.       Risk Assessment:  Patient adamantly and convincingly denies having SI/HI with or without intent, plans, or means. Patient denies having Hallucinations/Illusions and Delusions.  Patient  denies self-harming behaviors      Risk Level: History obtained from: patient and chart review.  Due to historical context and reported clinical markers, it appears patient meets criteria for LOW RISK to engage in self-harm or harm to others.  It is recommended Maggie be evaluated and assessed each contact for intent, plan, means and/or lethality each contact.    Behavior Health Review Of Systems:  Psychiatric/Behavioral: Negative for agitation, behavioral problems,  hallucinations, self-injury, sleep disturbance, suicidal ideas, negative for hyperactivity. Positive for dysphoric mood,  decreased concentration, and stress. The patient is nervous/anxious.    Pertinent items are noted in HPI.    MENTAL STATUS EXAM   General Appearance:  Cleanly groomed and dressed  Eye Contact:  Good eye contact  Attitude:  Cooperative  Motor Activity:  Other  Other Comment:  Video visit  Speech:  Normal rate, tone, volume  Language:  Spontaneous  Mood and affect:  Anxious  Loneliness: Denies  Thought Process:  Logical, goal-directed and linear  Associations/ Thought Content:  No delusions  Hallucinations:  None  Suicidal Ideations:  Not present  Homicidal Ideation:  Not present  Sensorium:  Alert  Orientation:  Person, place, time and situation  Immediate Recall, Recent, and Remote Memory:  Other  Other Comment:  Grossly intact  Attention Span/ Concentration:  Easily distracted  Fund of  Knowledge:  Appropriate for age and educational level  Insight:  Good  Judgement:  Good  Reliability:  Good  Impulse Control:  Good       Treatment plan status:  Active and Quarterly Review 01/06/25   Treatment plan progress: Progressing  Prognosis: Guarded with Ongoing Treatment  Functional Status: Moderate impairment   Disposition:   Patient does not appear to be malingering.     Session Summary: Therapist met individually with patient this date. Discussed progress in treatment and any needs/concerns. Encouraged the patient to discuss/vent their feelings, frustrations, and fears concerning their ongoing issues and validated their feelings. Assisted patient in processing above session content; acknowledged and normalized patient’s thoughts, feelings, and concerns.  Discussed boundaries and ways to increase positive interactions with others. Therapist applied CBT/REBT and Exploration of Relationship Patterns and encouraged the patient to use positive coping skills such as Establish healthy boundaries , Use positive self-talk, Keep a positive attitude, and Utilize resources/coping skills.    Allowed patient to freely discuss issues without interruption or judgment. Provided safe, confidential environment to facilitate the development of positive therapeutic relationship and encourage open, honest communication.     Visit Diagnosis/Plan    ICD-10-CM ICD-9-CM   1. MIRIAM (generalized anxiety disorder)  F41.1 300.02   2. Dysthymic disorder  F34.1 300.4     Clinical Maneuvering:  All treatment options available at [facility] explored and documented.  The benefits of a healthy diet and exercise were discussed with patient, especially the positive effects they have on mental health. Patient encouraged to consider lifestyle modification regarding  diet and exercise patterns to maximize results of mental health treatment.  Reviewed previous available documentation  Reviewed most recent available labs      Justification for therapy: Patient continues to struggle with a chronic/pervasive mental illness which continues to cause impairment in at least two important areas of functioning.  Patient appear(s) to maintain relative stability as compared to the  baseline measure.  Patient can reasonably be expected to continue to benefit from treatment and would likely be at increased risk for decompensation if treatment were stopped.  Patient endorses a positive benefit from therapy and appears to meet outpatient level of care. Patient expresses gratitude and reports she had a positive experience today.    Recommended Referrals: Psychiatrist/APRN and Medical Provider (PCP)    Follow Up:  Return in about 3-5 weeks, or earlier if symptoms worsen or fail to improve for next follow up visit. [phone]      The patient understands that treatment is conditional on adhering to all Behavioral Health Outpatient Policy and Procedures.  The patient understands that providers/clinic has discretion to dismissed them from care if these are breached and a recommendation for further care will be made at time of discharge.    Future Appointments         Provider Department Center    2/3/2025 12:00 PM Yumiko Alejandre LCSW [facility]    2/27/2025 1:30 PM Haim Hill MD [facility]    3/5/2025 11:00 AM Yumiko Alejandre LCSW [facility]    4/2/2025 11:00 AM Yumiko Alejandre LCSW [facility]    4/30/2025 11:00 AM Yumiko Alejandre LCSW [facility]    8/11/2025 2:00 PM Daniel Ziegler MD [facility]            This document electronically signed by Yumiko Miles LCSW, [facility] January 6, 2025 12:54 EST    At [facility], we believe that sharing  information builds trust and better relationships. You are receiving this note because you are receiving care at [facility] or have recently visited. It is possible you will see health information before a provider has talked with you about it. This kind of information can be easy to misunderstand as it is a medical document. It is intended as [id number] communication. It is written in medical language and may contain abbreviations or verbiage that are unfamiliar. It may appear blunt or direct. Medical documents are intended to carry relevant information, facts as evident, and the clinical opinion of the practitioner.  To help you fully understand what it means for your health, we urge you to discuss this note with your provider    Oj Disclaimer:  Please note that portions of this documentation may have been completed with a voice recognition program.  Efforts were made to edit this dictation, but occasional words may have been mistranscribed.

## 2025-01-06 NOTE — PLAN OF CARE
Problem: Anxiety  Goal: Patient will develop and implement behavioral and cognitive strategies to reduce anxiety and irrational fears.  Outcome: Progressing     Problem: Co-Dependency  Goal: Patient will demonstrate ability to set healthy boundaries and meet own needs within a relationship.  Outcome: Progressing     Problem: Depression  Goal: Patient will demonstrate the ability to initiate new constructive life skills outside of sessions on a consistent basis.  Outcome: Progressing   Patient has been able to set and maintain boundaries more often than not. Has been able to accept husbands illness to some degree.

## 2025-02-03 ENCOUNTER — TELEMEDICINE (OUTPATIENT)
Dept: PSYCHIATRY | Facility: CLINIC | Age: 75
End: 2025-02-03
Payer: MEDICARE

## 2025-02-03 DIAGNOSIS — F34.1 DYSTHYMIC DISORDER: ICD-10-CM

## 2025-02-03 DIAGNOSIS — F41.1 GAD (GENERALIZED ANXIETY DISORDER): Primary | ICD-10-CM

## 2025-02-03 PROCEDURE — 1160F RVW MEDS BY RX/DR IN RCRD: CPT | Performed by: SOCIAL WORKER

## 2025-02-03 PROCEDURE — 90834 PSYTX W PT 45 MINUTES: CPT | Performed by: SOCIAL WORKER

## 2025-02-03 PROCEDURE — 1159F MED LIST DOCD IN RCRD: CPT | Performed by: SOCIAL WORKER

## 2025-02-03 NOTE — PROGRESS NOTES
[facility]  Outpatient Telehealth Progress Note   Patient Status:  Established  Name:  Maggie Emery  :  1950  Date of Service: 2/3/2025  Time In: 12:02 EST  Time Out: 1250     HIPAA: You have chosen to receive care through a video visit today. This provider is located at home address in connection with the [facility] (through [facility]), [address], [facility] The Patient is seen remotely via telehealth at [facility] in her vehicle  using Epic/[facility] platforms and is in a secure environment for this session. The patient's condition being diagnosed/treated is appropriate for telemedicine. The provider identified herself and credentials LCSW, GOPALDC.  The patient and/or guardian consent(s) to be seen remotely, and when consent is given, she understand(s) consent allows for patient identifiable information to be sent to a third party as needed. Maggie can refuse to be seen remotely at any time. The electronic data is encrypted and password protected, and the patient has been advised of the potential risks to privacy, not withstanding such measures.  You have chosen to receive care through a telehealth visit.  The patient has signed the video visit consent form.  The visit included audio and video interaction. No technical issues occurred during this visit.  Verified the patients identify using Name and date of birth. Patient states there are no changes the their insurance.     IDENTIFYING INFORMATION:  The patient is a 75 y.o. female who presents for  an outpatient follow-up appointment.      I, Maggie Liliana Patonie, hereby acknowledge that I have the right to discuss the assessment, potential risks, and benefits of any recommended treatment.    Chief Complaint: Patient reports problems with depression and anxiety.     Session Goal:  Patient with explore and process thoughts/feelings/coping skills to prevent  deterioration of mood and need for a higher level of care.    Subjective   HPI:  Patient arrived for session on time, clean and casually dressed without evidence of intoxication, withdrawal, or perceptual disturbance. Patient arrived as: age appropriate.  Patient indicates she is an open and willing participant in today's session.  Shares updates about grandson who is still living out of the home,  has CVOID, youngest daughter/grand kids have CVOID. Has started physical therapy for her knees.      Depression Loss of Interest, Feelings of guilt/worthlessness, and Low energy  Generalized Anxiety  Excess Worry, Restless/Edgy, Easily fatigued, Muscle tension, and Decreased concentration. Onset of symptoms was vague.  Symptoms are associated with financial burdens and lack of support.  Symptoms are aggravated by anxiety, lonely, sadness, and stress.   Symptoms improve with medication management, therapy, and personal self-care (wellness) Current rates severity of symptoms, on a scale of 1-10 (10 is the most severe) 6 Context Family and social history was reviewed  Quality been intermittent without a consistent pattern.    Substance Use: occasional alcohol use  Recent labs:    Last Urine Toxicity           No data to display              Objective    Medical History: Areas Reviewed: The following portions of the patient's history were reviewed and updated as appropriate: allergies, current medications, past family history, past medical history, past social history, past surgical history, and problem list.    Vitals:  Weight:  No Weight Documented This Encounter  Height: No Height Documented This Encounter  BMI:  There is no height or weight on file to calculate BMI.  Education:  The benefits of a healthy diet and exercise were discussed with patient, especially the positive effects they have on mental health. Patient encouraged to consider lifestyle modification regarding  diet and exercise patterns to maximize  results of mental health treatment.    Medication Review:    Current Outpatient Medications:     Crestor 20 MG tablet, Take 1 tablet by mouth Daily., Disp: , Rfl:     DULoxetine (CYMBALTA) 60 MG capsule, Take 1 capsule by mouth Daily., Disp: 90 capsule, Rfl: 0    eszopiclone (Lunesta) 2 MG tablet, Take 1 tablet by mouth At Night As Needed for Sleep. Take immediately before bedtime, Disp: 30 tablet, Rfl: 2    fluticasone (FLONASE) 50 MCG/ACT nasal spray, Flonase Allergy Relief 50 mcg/actuation nasal spray,suspension  2 sprays each nostril, Disp: , Rfl:     Fluticasone Furoate-Vilanterol (BREO ELLIPTA) 200-25 MCG/ACT inhaler, , Disp: , Rfl:     losartan-hydrochlorothiazide (HYZAAR) 100-25 MG per tablet, Take 1 tablet by mouth Daily., Disp: , Rfl:     Multiple Vitamins-Minerals (Systane ICaps AREDS2) capsule, ICaps AREDS2, Disp: , Rfl:     NIFEdipine XL (PROCARDIA XL) 60 MG 24 hr tablet, Take 1 tablet by mouth Daily., Disp: , Rfl:     Potassium Chloride (KCL-20 PO), Take 20 tablets by mouth Daily., Disp: , Rfl:     potassium chloride (KLOR-CON M20) 20 MEQ CR tablet, Take 1 tablet by mouth Daily., Disp: , Rfl:     rizatriptan MLT (MAXALT-MLT) 5 MG disintegrating tablet, Take 1 tablet by mouth 1 (One) Time As Needed for Migraine. May repeat in 2 hours if needed, Disp: , Rfl:     solifenacin (VESICARE) 5 MG tablet, Daily., Disp: , Rfl:     Toprol XL 50 MG 24 hr tablet, Take 1 tablet by mouth., Disp: , Rfl:      Medication Compliance:  compliance with medication regimen;   Assessment & Plan    Trauma Assessment:  Patient denies having been hit, slapped, kicked or otherwise physically hurt by others since last visit as well as having been forced to engage in any unwanted sexual acts since last visit.       Risk Assessment:  Patient adamantly and convincingly denies having SI/HI with or without intent, plans, or means. Patient denies having Hallucinations/Illusions and Delusions.  Patient  denies self-harming behaviors       Risk Level: History obtained from: patient and chart review.  Due to historical context and reported clinical markers, it appears patient meets criteria for LOW RISK to engage in self-harm or harm to others.  It is recommended Maggie be evaluated and assessed each contact for intent, plan, means and/or lethality each contact.    Behavior Health Review Of Systems:  Psychiatric/Behavioral: Negative for agitation, behavioral problems,  dysphoric mood, hallucinations, self-injury, sleep disturbance, suicidal ideas, negative for hyperactivity. Positive for depressed mood, decreased concentration and stress. The patient is nervous/anxious.    Pertinent items are noted in HPI.    MENTAL STATUS EXAM   General Appearance:  Cleanly groomed and dressed  Eye Contact:  Good eye contact  Attitude:  Cooperative  Motor Activity:  Other  Other Comment:  Video visit  Speech:  Normal rate, tone, volume  Language:  Spontaneous  Mood and affect:  Anxious  Hopelessness:  Denies  Loneliness: Denies  Thought Process:  Logical, goal-directed and linear  Associations/ Thought Content:  No delusions  Hallucinations:  None  Suicidal Ideations:  Not present  Homicidal Ideation:  Not present  Sensorium:  Alert  Orientation:  Person, place, time and situation  Immediate Recall, Recent, and Remote Memory:  Other  Other Comment:  Grossly intact  Attention Span/ Concentration:  Easily distracted  Fund of Knowledge:  Appropriate for age and educational level  Insight:  Good  Judgement:  Good  Reliability:  Good  Impulse Control:  Good       Treatment plan status:  Active   Treatment plan progress: Progressing  Prognosis: Guarded with Ongoing Treatment  Functional Status: Mild impairment   Disposition:   Patient does not appear to be malingering.     Session Summary: Therapist met individually with patient this date. Discussed progress in treatment and any needs/concerns.  Encouraged the patient to discuss/vent their feelings, frustrations, and  fears concerning their ongoing issues and validated their feelings.  Assisted patient in processing above session content; acknowledged and normalized patient’s thoughts, feelings, and concerns acknowledging the patients worries and sadness over relationships within the family.  Therapist applied CBT/REBT and Interactive Feedback and encouraged the patient to use positive coping skills such as Reframe the way you are thinking about the problem, Use positive self-talk, Keep a positive attitude, Demonstrate self-control, and Utilize resources/coping skills.    Allowed patient to freely discuss issues without interruption or judgment. Provided safe, confidential environment to facilitate the development of positive therapeutic relationship and encourage open, honest communication.     Visit Diagnosis/Plan    ICD-10-CM ICD-9-CM   1. MIRIAM (generalized anxiety disorder)  F41.1 300.02   2. Dysthymic disorder  F34.1 300.4     Clinical Maneuvering:  All treatment options available at [facility] explored and documented.  The benefits of a healthy diet and exercise were discussed with patient, especially the positive effects they have on mental health. Patient encouraged to consider lifestyle modification regarding  diet and exercise patterns to maximize results of mental health treatment.  Reviewed previous available documentation  Reviewed most recent available labs     Justification for therapy: Patient continues to struggle with a chronic/pervasive mental illness which continues to cause impairment in at least two important areas of functioning.  Patient appear(s) to maintain relative stability as compared to the  baseline measure.  Patient can reasonably be expected to continue to benefit from treatment and would likely be at increased risk for decompensation if treatment were stopped.  Patient endorses a positive benefit from therapy and appears to meet outpatient level of care. Patient expresses  gratitude and reports she had a positive experience today.    Recommended Referrals: Psychiatrist/APRN and Medical Provider (PCP)    Follow Up:  Return in about 3-4 weeks, or earlier if symptoms worsen or fail to improve for next follow up visit. [phone]      The patient understands that treatment is conditional on adhering to all Behavioral Health Outpatient Policy and Procedures.  The patient understands that providers/clinic has discretion to dismissed them from care if these are breached and a recommendation for further care will be made at time of discharge.    Future Appointments         Provider Department Center    2/27/2025 1:30 PM Haim Hill MD [facility]    3/5/2025 11:00 AM Yumiko Alejandre LCSW [facility]    4/2/2025 11:00 AM Yumiko Alejandre LCSW [facility]    4/30/2025 11:00 AM Yumiko Alejandre LCSW [facility]    8/11/2025 2:00 PM Daniel Ziegler MD [facility]            This document electronically signed by Yumiko Mlies LCSW, CRISTELA February 3, 2025 12:57 EST    At [facility], we believe that sharing information builds trust and better relationships. You are receiving this note because you are receiving care at [facility] or have recently visited. It is possible you will see health information before a provider has talked with you about it. This kind of information can be easy to misunderstand as it is a medical document. It is intended as [id number] communication. It is written in medical language and may contain abbreviations or verbiage that are unfamiliar. It may appear blunt or direct. Medical documents are intended to carry relevant information, facts as evident, and the clinical opinion of the practitioner.  To help you fully  understand what it means for your health, we urge you to discuss this note with your provider    Oj Disclaimer:  Please note that portions of this documentation may have been completed with a voice recognition program.  Efforts were made to edit this dictation, but occasional words may have been mistranscribed.

## 2025-02-27 ENCOUNTER — OFFICE VISIT (OUTPATIENT)
Dept: PSYCHIATRY | Facility: CLINIC | Age: 75
End: 2025-02-27
Payer: MEDICARE

## 2025-02-27 VITALS
WEIGHT: 169 LBS | DIASTOLIC BLOOD PRESSURE: 72 MMHG | BODY MASS INDEX: 31.91 KG/M2 | SYSTOLIC BLOOD PRESSURE: 128 MMHG | HEART RATE: 74 BPM | OXYGEN SATURATION: 92 % | HEIGHT: 61 IN

## 2025-02-27 DIAGNOSIS — F51.01 PRIMARY INSOMNIA: ICD-10-CM

## 2025-02-27 DIAGNOSIS — F41.1 GAD (GENERALIZED ANXIETY DISORDER): ICD-10-CM

## 2025-02-27 DIAGNOSIS — F34.1 DYSTHYMIC DISORDER: Primary | ICD-10-CM

## 2025-02-27 PROCEDURE — 3074F SYST BP LT 130 MM HG: CPT | Performed by: PSYCHIATRY & NEUROLOGY

## 2025-02-27 PROCEDURE — 3078F DIAST BP <80 MM HG: CPT | Performed by: PSYCHIATRY & NEUROLOGY

## 2025-02-27 PROCEDURE — 99214 OFFICE O/P EST MOD 30 MIN: CPT | Performed by: PSYCHIATRY & NEUROLOGY

## 2025-02-27 RX ORDER — DULOXETIN HYDROCHLORIDE 60 MG/1
60 CAPSULE, DELAYED RELEASE ORAL DAILY
Qty: 90 CAPSULE | Refills: 0 | Status: SHIPPED | OUTPATIENT
Start: 2025-02-27

## 2025-02-27 RX ORDER — ESZOPICLONE 2 MG/1
2 TABLET, FILM COATED ORAL NIGHTLY PRN
Qty: 30 TABLET | Refills: 2 | Status: SHIPPED | OUTPATIENT
Start: 2025-02-27 | End: 2026-02-27

## 2025-02-27 RX ORDER — ESZOPICLONE 2 MG/1
2 TABLET, FILM COATED ORAL NIGHTLY PRN
Qty: 30 TABLET | Refills: 2 | Status: SHIPPED | OUTPATIENT
Start: 2025-02-27 | End: 2025-02-27 | Stop reason: SDUPTHER

## 2025-02-27 RX ORDER — DULOXETIN HYDROCHLORIDE 60 MG/1
60 CAPSULE, DELAYED RELEASE ORAL DAILY
Qty: 90 CAPSULE | Refills: 0 | Status: SHIPPED | OUTPATIENT
Start: 2025-02-27 | End: 2025-02-27 | Stop reason: SDUPTHER

## 2025-02-27 NOTE — PROGRESS NOTES
"Patient ID: Maggie Emery is a 75 y.o. female    SERVICE TYPE: EVALUATION AND MANAGEMENT (greater than 50% of the time spent for supportive psychotherapy).      /72   Pulse 74   Ht 156.2 cm (61.5\")   Wt 76.7 kg (169 lb)   SpO2 92%   BMI 31.42 kg/m²     ALLERGIES:  Ambien [zolpidem tartrate], Darvon [propoxyphene], Hydrocodone, and Ibuprofen    CC/ Focus of the visit: Dysthymia    HPI: Patient enjoyed reviewing the status of her three children and their families - her blessing in life which becomes more worry than blessing. \"Feel good today\".  No superimposed episodes of clinical depression, her chronic dysthymic nature persists.  Seems she is at her baseline level of functioning and coping with the home situation, her alcohol dependent .  She continues to benefit from her psychotherapeutic effort with Yumiko Palencia LCSW. Sleeping normally for her with the eszopiclone she has taken for years.     Coping behaviors:  Gardening, involvement with her children and grandchildren, limited walks, her cat, potted plants, might resuming quilting.    PFSH: Home situation basically the same.  's drinking is episodic.      Review of Systems  No cardiopulmonary, GI or neurological complaints.  Right knee issue, potential surgery possibly this spring. Starting PT.     SUPPORTIVE PSYCHOTHERAPY: continuing efforts to promote the therapeutic alliance, address the patient’s issues, and strengthen self awareness, insights, and positive coping skills such as Exercising, listen to music, spending time in nature and utilizing resources.     Mental Status Exam  Appearance:  appropriate  Attitude toward clinician:  cooperative and agreeable   Speech:    Rate:  regular rate and rhythm   Volume: normal  Motor:  no abnormal movements   Mood:  Good  Affect:  euthymic  Thought Processes:  linear, logical, and goal directed  Thought Content:  Normal   Feeling Hopeless: absent  Suicidal Thoughts or Intent:  absent  Homicidal " Thoughts:  absent  Perceptual Disturbance: no perceptual disturbance  Attention and Concentration:  good  Insight and Judgement:  good  Memory:  memory appears to be intact    LABS: No results found for this or any previous visit (from the past week).    MEDICATION ISSUES: Have discussed with the patient the medications Risks, Benefits, and Side effects including potential falls, possible impaired driving and  metabolic adversities among others. No medication side effects or related complaints today.     TREATMENT PLAN/GOALS: Continue supportive psychotherapy efforts and medications as indicated.     Current Outpatient Medications   Medication Sig Dispense Refill    DULoxetine (CYMBALTA) 60 MG capsule Take 1 capsule by mouth Daily. 90 capsule 0    eszopiclone (Lunesta) 2 MG tablet Take 1 tablet by mouth At Night As Needed for Sleep. Take immediately before bedtime 30 tablet 2    Crestor 20 MG tablet Take 1 tablet by mouth Daily.      fluticasone (FLONASE) 50 MCG/ACT nasal spray Flonase Allergy Relief 50 mcg/actuation nasal spray,suspension   2 sprays each nostril      Fluticasone Furoate-Vilanterol (BREO ELLIPTA) 200-25 MCG/ACT inhaler       losartan-hydrochlorothiazide (HYZAAR) 100-25 MG per tablet Take 1 tablet by mouth Daily.      Multiple Vitamins-Minerals (Systane ICaps AREDS2) capsule ICaps AREDS2      NIFEdipine XL (PROCARDIA XL) 60 MG 24 hr tablet Take 1 tablet by mouth Daily.      Potassium Chloride (KCL-20 PO) Take 20 tablets by mouth Daily.      potassium chloride (KLOR-CON M20) 20 MEQ CR tablet Take 1 tablet by mouth Daily.      rizatriptan MLT (MAXALT-MLT) 5 MG disintegrating tablet Take 1 tablet by mouth 1 (One) Time As Needed for Migraine. May repeat in 2 hours if needed      solifenacin (VESICARE) 5 MG tablet Daily.      Toprol XL 50 MG 24 hr tablet Take 1 tablet by mouth.       No current facility-administered medications for this visit.       COLLATERAL PSYCHOTHERAPEUTIC INTERVENTION: patient is  continuing with Yumiko Palencia LCSW.      RISK:  low    Assessment & Plan     Diagnoses and all orders for this visit:    1. Dysthymic disorder (Primary)  -     Discontinue: DULoxetine (CYMBALTA) 60 MG capsule; Take 1 capsule by mouth Daily.  Dispense: 90 capsule; Refill: 0      2. MIRIAM (generalized anxiety disorder)  -     Discontinue: DULoxetine (CYMBALTA) 60 MG capsule; Take 1 capsule by mouth Daily.  Dispense: 90 capsule; Refill: 0    3. Primary insomnia    -     eszopiclone (Lunesta) 2 MG tablet; Take 1 tablet by mouth At Night As Needed for Sleep. Take immediately before bedtime  Dispense: 30 tablet; Refill: 2        Return in about 3 months (around 5/27/2025).           Patient knows to call if symptoms worsen or fail to improve between appointments.    I spent 30 minutes caring for Maggie on this date of service. This time includes time spent by me in the following activities: Patient evaluation, support psychotherapy, decisions, medications, and documentation.     Dictated utilizing Lalina dictation    MAKSIM Hill MD

## 2025-03-05 ENCOUNTER — TELEMEDICINE (OUTPATIENT)
Dept: PSYCHIATRY | Facility: CLINIC | Age: 75
End: 2025-03-05
Payer: MEDICARE

## 2025-03-05 DIAGNOSIS — F41.1 GAD (GENERALIZED ANXIETY DISORDER): Primary | ICD-10-CM

## 2025-03-05 DIAGNOSIS — F34.1 DYSTHYMIC DISORDER: ICD-10-CM

## 2025-03-05 NOTE — PROGRESS NOTES
[facility]  Outpatient Telehealth Progress Note   Patient Status:  Established  Name:  Maggie Emery  :  1950  Date of Service: 3/5/2025  Time In: 11:02 EST  Time Out: 1157     HIPAA: You have chosen to receive care through a video visit today. This provider is located at home address in connection with the [facility] (through [facility]), [address], [place] [address] The Patient is seen remotely via telehealth at home ([address] [place] [address] [place] [address]) using [employer] platforms and is in a secure environment for this session. The patient's condition being diagnosed/treated is appropriate for telemedicine. The provider identified herself and credentials LCSW, GOPALDC.  The patient and/or guardian consent(s) to be seen remotely, and when consent is given, she understand(s) consent allows for patient identifiable information to be sent to a third party as needed. Maggie can refuse to be seen remotely at any time. The electronic data is encrypted and password protected, and the patient has been advised of the potential risks to privacy, not withstanding such measures.  The patient has signed the video visit consent form.  The visit included audio and video interaction. No technical issues occurred during this visit.     Verified the patients identify using Name and date of birth. Patient states there are no changes the their insurance.     IDENTIFYING INFORMATION:  The patient is a 75 y.o. female who presents for  an outpatient follow-up appointment.      I, Maggie Liliana Paugh, hereby acknowledge that I have the right to discuss the assessment, potential risks, and benefits of any recommended treatment.    Chief Complaint: Patient reports problems with depression and anxiety.     Session Goal:  Patient with explore and process thoughts/feelings/coping skills to prevent deterioration of mood and need for a higher level of  care.    Subjective   HPI:  Patient arrived for session on time, clean and casually dressed without evidence of intoxication, withdrawal, or perceptual disturbance. Patient arrived as: age appropriate.  Patient indicates she is an open and willing participant in today's session.    History of Present Illness  The patient presents via virtual visit for evaluation of physical therapy.    She is currently engaged in physical therapy, attending sessions twice weekly to improve her leg strength. She has expressed a preference to postpone any surgical intervention until the fall season. Despite not incorporating weight training into her regimen, she reports a perceived increase in strength. However, she continues to experience fatigue, limiting her daily activities to a few tasks. She supplements her bi-weekly physical therapy sessions with home exercises.       Depression Loss of Interest and Low energy  Generalized Anxiety  Excess Worry, Easily fatigued, Muscle tension, and Decreased concentration. Onset of symptoms was vague.  Symptoms are associated with relationship problem with unchanged since last visit, financial burdens, and lack of support.  Symptoms are aggravated by anxiety, lonely, sadness, stress, and weight management.   Symptoms improve with medication management, therapy, and personal self-care (wellness) Current rates severity of symptoms, on a scale of 1-10 (10 is the most severe) 5 Context Family and social history was reviewed Quality been intermittent without a consistent pattern.     Substance Use:  none at present  Recent labs:    Last Urine Toxicity           No data to display              Objective    Medical History: Areas Reviewed: The following portions of the patient's history were reviewed and updated as appropriate: allergies, current medications, past family history, past medical history, past social history, past surgical history, and problem list.    Vitals:  Weight:  No Weight  Documented This Encounter  Height: No Height Documented This Encounter  BMI:  There is no height or weight on file to calculate BMI.  Education:  The benefits of a healthy diet and exercise were discussed with patient, especially the positive effects they have on mental health. Patient encouraged to consider lifestyle modification regarding  diet and exercise patterns to maximize results of mental health treatment.    Medication Review:    Current Outpatient Medications:     Crestor 20 MG tablet, Take 1 tablet by mouth Daily., Disp: , Rfl:     DULoxetine (CYMBALTA) 60 MG capsule, Take 1 capsule by mouth Daily., Disp: 90 capsule, Rfl: 0    eszopiclone (Lunesta) 2 MG tablet, Take 1 tablet by mouth At Night As Needed for Sleep. Take immediately before bedtime, Disp: 30 tablet, Rfl: 2    fluticasone (FLONASE) 50 MCG/ACT nasal spray, Flonase Allergy Relief 50 mcg/actuation nasal spray,suspension  2 sprays each nostril, Disp: , Rfl:     Fluticasone Furoate-Vilanterol (BREO ELLIPTA) 200-25 MCG/ACT inhaler, , Disp: , Rfl:     losartan-hydrochlorothiazide (HYZAAR) 100-25 MG per tablet, Take 1 tablet by mouth Daily., Disp: , Rfl:     Multiple Vitamins-Minerals (Systane ICaps AREDS2) capsule, ICaps AREDS2, Disp: , Rfl:     NIFEdipine XL (PROCARDIA XL) 60 MG 24 hr tablet, Take 1 tablet by mouth Daily., Disp: , Rfl:     Potassium Chloride (KCL-20 PO), Take 20 tablets by mouth Daily., Disp: , Rfl:     potassium chloride (KLOR-CON M20) 20 MEQ CR tablet, Take 1 tablet by mouth Daily., Disp: , Rfl:     rizatriptan MLT (MAXALT-MLT) 5 MG disintegrating tablet, Take 1 tablet by mouth 1 (One) Time As Needed for Migraine. May repeat in 2 hours if needed, Disp: , Rfl:     solifenacin (VESICARE) 5 MG tablet, Daily., Disp: , Rfl:     Toprol XL 50 MG 24 hr tablet, Take 1 tablet by mouth., Disp: , Rfl:      Medication Compliance:  compliance with medication regimen;   Assessment & Plan    Trauma Assessment:  Patient denies having been hit,  slapped, kicked or otherwise physically hurt by others since last visit as well as having been forced to engage in any unwanted sexual acts since last visit.       Risk Assessment:  Patient adamantly and convincingly denies having SI/HI with or without intent, plans, or means. Patient denies having Hallucinations/Illusions and Delusions.  Patient  denies self-harming behaviors      Risk Level: History obtained from: patient and chart review.  Due to historical context and reported clinical markers, it appears patient meets criteria for LOW RISK to engage in self-harm or harm to others.  It is recommended Maggie be evaluated and assessed each contact for intent, plan, means and/or lethality each contact.      Behavior Health Review Of Systems:  Psychiatric/Behavioral: Negative for agitation, behavioral problems,   hallucinations, self-injury, sleep disturbance, suicidal ideas, negative for hyperactivity. Positive for   decreased concentration, dysphoric mood,and stress. The patient is nervous/anxious.    Pertinent items are noted in HPI.    MENTAL STATUS EXAM   General Appearance:  Cleanly groomed and dressed  Eye Contact:  Good eye contact  Attitude:  Cooperative  Motor Activity:  Other  Other Comment:  Video visit  Speech:  Normal rate, tone, volume  Mood and affect:  Normal, pleasant  Hopelessness:  Denies  Loneliness: 4  Thought Process:  Logical, goal-directed and linear  Associations/ Thought Content:  No delusions  Hallucinations:  None  Suicidal Ideations:  Not present  Homicidal Ideation:  Not present  Sensorium:  Alert  Orientation:  Person, place, time and situation  Immediate Recall, Recent, and Remote Memory:  Other  Other Comment:  Grossly intact  Attention Span/ Concentration:  Easily distracted  Fund of Knowledge:  Appropriate for age and educational level  Insight:  Good  Judgement:  Good  Reliability:  Good  Impulse Control:  Good       Treatment plan status:  Active   Treatment plan progress:  Progressing  Prognosis: Guarded with Ongoing Treatment  Functional Status: Moderate impairment   Disposition:   Patient does not appear to be malingering.     Session Summary: Therapist met individually with patient this date. Discussed progress in treatment and any needs/concerns. Encouraged the patient to discuss/vent their feelings, frustrations, and fears concerning their ongoing issues and validated their feelings.  Continued psycho-education of anxiety, depression and codependency.  Assisted patient in processing above session content; acknowledged and normalized patient’s thoughts, feelings, and concerns. Offering unconditional positive regard while she shared. Therapist applied CBT/REBT and Exploration of Coping Skills and encouraged the patient to use positive coping skills such as Reframe the way you are thinking about the problem, Walk away (leave a situation that is causing you stress), Use positive self-talk, Keep a positive attitude, Demonstrate self-control, and Utilize resources/coping skills.    Allowed patient to freely discuss issues without interruption or judgment. Provided safe, confidential environment to facilitate the development of positive therapeutic relationship and encourage open, honest communication.     Visit Diagnosis/Plan    ICD-10-CM ICD-9-CM   1. MIRIAM (generalized anxiety disorder)  F41.1 300.02   2. Dysthymic disorder  F34.1 300.4     Clinical Maneuvering:  All treatment options available at [facility] explored and documented.  The benefits of a healthy diet and exercise were discussed with patient, especially the positive effects they have on mental health. Patient encouraged to consider lifestyle modification regarding  diet and exercise patterns to maximize results of mental health treatment.  Reviewed previous available documentation  Reviewed most recent available labs     Justification for therapy: Patient continues to struggle with a  chronic/pervasive mental illness which continues to cause impairment in at least two important areas of functioning.  Patient appear(s) to maintain relative stability as compared to the  baseline measure.  Patient can reasonably be expected to continue to benefit from treatment and would likely be at increased risk for decompensation if treatment were stopped.  Patient endorses a positive benefit from therapy and appears to meet outpatient level of care. Patient expresses gratitude and reports she had a positive experience today.    Recommended Referrals: Psychiatrist/APRN and Medical Provider (PCP)    Follow Up:  Return in about 4 weeks, or earlier if symptoms worsen or fail to improve for next follow up visit. [phone]      The patient understands that treatment is conditional on adhering to all Behavioral Health Outpatient Policy and Procedures.  The patient understands that providers/clinic has discretion to dismissed them from care if these are breached and a recommendation for further care will be made at time of discharge.    Future Appointments         Provider Department Center    4/2/2025 11:00 AM Yumiko Alejandre LCSW [facility]    4/30/2025 11:00 AM Yumiko Alejandre LCSW [facility]    5/22/2025 1:30 PM Haim Hill MD [facility]    8/11/2025 2:00 PM Daniel Ziegler MD [facility]            This document electronically signed by Yumiko Miles LCSW, [facility] March 5, 2025 11:24 EST    Patient or patient representative verbalized consent for the use of Ambient Listening during the visit with  Yumiko Miles LCSW for chart documentation. 3/5/2025  11:03 EST    At [facility], we believe that sharing information builds trust and better relationships. You are receiving this note because you are  receiving care at [facility] or have recently visited. It is possible you will see health information before a provider has talked with you about it. This kind of information can be easy to misunderstand as it is a medical document. It is intended as [id number] communication. It is written in medical language and may contain abbreviations or verbiage that are unfamiliar. It may appear blunt or direct. Medical documents are intended to carry relevant information, facts as evident, and the clinical opinion of the practitioner.  To help you fully understand what it means for your health, we urge you to discuss this note with your provider

## 2025-04-02 ENCOUNTER — TELEMEDICINE (OUTPATIENT)
Dept: PSYCHIATRY | Facility: CLINIC | Age: 75
End: 2025-04-02
Payer: MEDICARE

## 2025-04-02 DIAGNOSIS — F41.1 GAD (GENERALIZED ANXIETY DISORDER): Primary | ICD-10-CM

## 2025-04-02 DIAGNOSIS — F34.1 DYSTHYMIC DISORDER: ICD-10-CM

## 2025-04-02 NOTE — PROGRESS NOTES
[facility]  Outpatient Telehealth Progress Note   Patient Status:  Established  Name:  Maggie Emery  :  1950  Date of Service: 2025  Time In: 10:57 EDT  Time Out: 11:56     HIPAA: You have chosen to receive care through a video visit today. This provider is located at home address in connection with the [facility] (through [facility]), [address], [place] [address] The Patient is seen remotely via telehealth at home ([address] [place] [address] [place] [id number]) using [employer] platforms and is in a secure environment for this session. The patient's condition being diagnosed/treated is appropriate for telemedicine. The provider identified herself and credentials LCSW, GOPALDC.  The patient and/or guardian consent(s) to be seen remotely, and when consent is given, she understand(s) consent allows for patient identifiable information to be sent to a third party as needed. Maggie can refuse to be seen remotely at any time. The electronic data is encrypted and password protected, and the patient has been advised of the potential risks to privacy, not withstanding such measures.  The patient has signed the video visit consent form.  The visit included audio and video interaction. No technical issues occurred during this visit.     Verified the patients identify using Name and date of birth. Patient states there are no changes the their insurance.     IDENTIFYING INFORMATION:  The patient is a 75 y.o. female who presents for  an outpatient follow-up appointment.      I, Maggie Dhillonbemarcus Emery, hereby acknowledge that I have the right to discuss the assessment, potential risks, and benefits of any recommended treatment.    Chief Complaint: Patient reports problems with depression, anxiety, and relationship problems.     Session Goal:  Patient with explore and process thoughts/feelings/coping skills to prevent deterioration of mood and need for a  higher level of care.    Subjective   HPI:  Patient arrived for session on time, clean and casually dressed without evidence of intoxication, withdrawal, or perceptual disturbance. Patient arrived as: age appropriate.  Patient indicates she is an open and willing participant in today's session.  Patient provides updates about how she is managing anxiety, codependency and depression. Overall she is managing her mood. Has good and bad days, noticing worry about her children and grandchildren over things she has not control over.  Continues to make an effort to keep communication lines open with children and grandchildren,   Shares about an interaction with her  where she was able to listen and then let go of things  said to her that were untrue and hurtful.     Depression Loss of Interest, Feelings of guilt/worthlessness, and Low energy  Generalized Anxiety  Excess Worry, Easily fatigued, and Muscle tension. Onset of symptoms was vague.  Symptoms are associated with relationship problem with unchanged since last visit, financial burdens, and lack of support.  Symptoms are aggravated by anxiety, lonely, sadness, stress, and weight management.   Symptoms improve with medication management, therapy, and personal self-care (wellness) Current rates severity of symptoms, on a scale of 1-10 (10 is the most severe) 5 Context Family and social history was reviewed and is unchanged  Quality been intermittent without a consistent pattern.    Somatic Symptoms:  knee pain, fatigue    Substance Use: occasional alcohol use  Recent labs:    Last Urine Toxicity           No data to display              Objective    Medical History: Areas Reviewed: The following portions of the patient's history were reviewed and updated as appropriate: allergies, current medications, past family history, past medical history, past social history, past surgical history, and problem list.    Vitals:  Weight:  No Weight Documented This  Encounter  Height: No Height Documented This Encounter  BMI:  There is no height or weight on file to calculate BMI.  Education:  The benefits of a healthy diet and exercise were discussed with patient, especially the positive effects they have on mental health. Patient encouraged to consider lifestyle modification regarding  diet and exercise patterns to maximize results of mental health treatment.    Medication Review:    Current Outpatient Medications:     Crestor 20 MG tablet, Take 1 tablet by mouth Daily., Disp: , Rfl:     DULoxetine (CYMBALTA) 60 MG capsule, Take 1 capsule by mouth Daily., Disp: 90 capsule, Rfl: 0    eszopiclone (Lunesta) 2 MG tablet, Take 1 tablet by mouth At Night As Needed for Sleep. Take immediately before bedtime, Disp: 30 tablet, Rfl: 2    fluticasone (FLONASE) 50 MCG/ACT nasal spray, Flonase Allergy Relief 50 mcg/actuation nasal spray,suspension  2 sprays each nostril, Disp: , Rfl:     Fluticasone Furoate-Vilanterol (BREO ELLIPTA) 200-25 MCG/ACT inhaler, , Disp: , Rfl:     losartan-hydrochlorothiazide (HYZAAR) 100-25 MG per tablet, Take 1 tablet by mouth Daily., Disp: , Rfl:     Multiple Vitamins-Minerals (Systane ICaps AREDS2) capsule, ICaps AREDS2, Disp: , Rfl:     NIFEdipine XL (PROCARDIA XL) 60 MG 24 hr tablet, Take 1 tablet by mouth Daily., Disp: , Rfl:     Potassium Chloride (KCL-20 PO), Take 20 tablets by mouth Daily., Disp: , Rfl:     potassium chloride (KLOR-CON M20) 20 MEQ CR tablet, Take 1 tablet by mouth Daily., Disp: , Rfl:     rizatriptan MLT (MAXALT-MLT) 5 MG disintegrating tablet, Take 1 tablet by mouth 1 (One) Time As Needed for Migraine. May repeat in 2 hours if needed, Disp: , Rfl:     solifenacin (VESICARE) 5 MG tablet, Daily., Disp: , Rfl:     Toprol XL 50 MG 24 hr tablet, Take 1 tablet by mouth., Disp: , Rfl:      Medication Compliance:  compliance with medication regimen;     Assessment & Plan    Trauma Assessment:  Patient denies having been hit, slapped, kicked  or otherwise physically hurt by others since last visit as well as having been forced to engage in any unwanted sexual acts since last visit.       Risk Assessment:  Patient adamantly and convincingly denies having SI/HI with or without intent, plans, or means. Patient denies having Hallucinations/Illusions and Delusions.  Patient   self-harming behaviors     Risk Level: History obtained from: patient and chart review.  Due to historical context and reported clinical markers, it appears patient meets criteria for LOW RISK to engage in self-harm or harm to others.  It is recommended Maggie be evaluated and assessed each contact for intent, plan, means and/or lethality each contact.      Behavior Health Review Of Systems:  Psychiatric/Behavioral: Negative for agitation, behavioral problems, decreased concentration,  hallucinations, self-injury, sleep disturbance, suicidal ideas, negative for hyperactivity. Positive for dysphoric mood, and stress. The patient is nervous/anxious.    Pertinent items are noted in HPI.    MENTAL STATUS EXAM   General Appearance:  Cleanly groomed and dressed  Eye Contact:  Good eye contact  Attitude:  Cooperative  Speech:  Normal rate, tone, volume  Language:  Spontaneous  Mood and affect:  Normal, pleasant  Hopelessness:  Denies  Loneliness: 3  Thought Process:  Logical, goal-directed and linear  Associations/ Thought Content:  No delusions  Suicidal Ideations:  Not present  Homicidal Ideation:  Not present  Sensorium:  Alert  Orientation:  Person, place, time and situation  Immediate Recall, Recent, and Remote Memory:  Other  Other Comment:  Grossly intact  Attention Span/ Concentration:  Easily distracted  Fund of Knowledge:  Appropriate for age and educational level  Insight:  Good  Judgement:  Good  Reliability:  Good  Impulse Control:  Good     Treatment plan status:  Quarterly Review 04/02/25   Treatment plan progress: Partially Met  Prognosis: Fair with Ongoing Treatment    Functional Status: Mild impairment   Disposition:   Patient does not appear to be malingering.     Session Summary: Therapist met individually with patient this date. Discussed progress in treatment and any needs/concerns.  Encouraged the patient to discuss/vent their feelings, frustrations, and fears concerning their ongoing issues and validated their feelings. Assisted patient in processing above session content; acknowledged and normalized patient’s thoughts, feelings, and concerns. Praised the patient for reaching out to grandsons and keeping communication open with her children despite the lack of response at times.  Processing interaction with  in which patient has been able to be more accepting of him and let go of the things that are unkind.hurtful.   Therapist applied CBT/REBT and Exploration of Relationship Patterns and encouraged the patient to use positive coping skills such as Reframe the way you are thinking about the problem, Time management (Work on managing your time better - for example, turn off the alerts on your phone), Establish healthy boundaries , and Utilize resources/coping skills.    Allowed patient to freely discuss issues without interruption or judgment. Provided safe, confidential environment to facilitate the development of positive therapeutic relationship and encourage open, honest communication.     Visit Diagnosis/Plan    ICD-10-CM ICD-9-CM   1. MIRIAM (generalized anxiety disorder)  F41.1 300.02   2. Dysthymic disorder  F34.1 300.4     Clinical Maneuvering:  All treatment options available at [facility] explored and documented.  The benefits of a healthy diet and exercise were discussed with patient, especially the positive effects they have on mental health. Patient encouraged to consider lifestyle modification regarding  diet and exercise patterns to maximize results of mental health treatment.  Reviewed previous available documentation  Reviewed  most recent available labs     Justification for therapy: Patient continues to struggle with a chronic/pervasive mental illness which continues to cause impairment in at least two important areas of functioning.  Patient appear(s) to maintain relative stability as compared to the  baseline measure.  Patient can reasonably be expected to continue to benefit from treatment and would likely be at increased risk for decompensation if treatment were stopped.  Patient endorses a positive benefit from therapy and appears to meet outpatient level of care. Patient expresses gratitude and reports she had a positive experience today.    Recommended Referrals: Psychiatrist/APRN and Medical Provider (PCP)    Follow Up:  Return in about 4 weeks, or earlier if symptoms worsen or fail to improve for next follow up visit. [phone]      The patient understands that treatment is conditional on adhering to all Behavioral Health Outpatient Policy and Procedures.  The patient understands that providers/clinic has discretion to dismissed them from care if these are breached and a recommendation for further care will be made at time of discharge.    Future Appointments         Provider Department Center    4/30/2025 11:00 AM Yumiko Alejandre LCSW [facility]    5/22/2025 1:30 PM Haim Hill MD [facility]    5/29/2025 11:00 AM Yumiko Alejandre LCSW [facility]    6/30/2025 11:00 AM Yumiko Alejandre LCSW [facility]    8/11/2025 2:00 PM Daniel Ziegler MD [facility]            This document electronically signed by Yumiko Miles LCSW, [facility] April 2, 2025 11:27 EDT    Patient or patient representative verbalized consent for the use of Ambient Listening during the visit with  Yumiko Palencia  RONALD Miles for chart documentation. 4/2/2025  10:58 EDT    At [facility], we believe that sharing information builds trust and better relationships. You are receiving this note because you are receiving care at [facility] or have recently visited. It is possible you will see health information before a provider has talked with you about it. This kind of information can be easy to misunderstand as it is a medical document. It is intended as [id number] communication. It is written in medical language and may contain abbreviations or verbiage that are unfamiliar. It may appear blunt or direct. Medical documents are intended to carry relevant information, facts as evident, and the clinical opinion of the practitioner.  To help you fully understand what it means for your health, we urge you to discuss this note with your provider

## 2025-04-02 NOTE — TREATMENT PLAN
Anesthesia Post Evaluation    Patient: Naima Jeffries    Procedure(s) Performed: Procedure(s) (LRB):  COLONOSCOPY (N/A)    Final Anesthesia Type: general      Patient location during evaluation: PACU  Patient participation: Yes- Able to Participate  Level of consciousness: awake and alert and oriented  Post-procedure vital signs: reviewed and stable  Pain management: adequate  Airway patency: patent    PONV status at discharge: No PONV  Anesthetic complications: no      Cardiovascular status: blood pressure returned to baseline, hemodynamically stable and stable  Respiratory status: unassisted, room air and spontaneous ventilation  Hydration status: euvolemic  Follow-up not needed.          Vitals Value Taken Time   BP 86/51 11/09/22 0940   Temp 36.5 °C (97.7 °F) 11/09/22 0940   Pulse 63 11/09/22 0940   Resp 16 11/09/22 0940   SpO2 100 % 11/09/22 0940         No case tracking events are documented in the log.      Pain/Coni Score: Coni Score: 9 (11/9/2022  9:40 AM)         Multi-Disciplinary Problems (from Behavioral Health Treatment Plan)      Active Problems       Problem: Anxiety  Start Date: 04/02/25      Problem Details: The patient self-scales this problem as a 5 with 10 being the worst.          Goal Priority Start Date Expected End Date End Date    Patient will develop and implement behavioral and cognitive strategies to reduce anxiety and irrational fears. -- 04/02/25 10/01/25 --    Goal Details: Progress toward goal:  The patient self-scales their progress related to this goal as a 4-5 with 10 being the worst.  Worries about her children and grandchildren.        Goal Intervention Frequency Start Date End Date    Help patient explore past emotional issues in relation to present anxiety. Each Visit 04/02/25 --    Intervention Details: Duration of treatment until remission of symptoms.        Goal Intervention Frequency Start Date End Date    Help patient develop an awareness of their cognitive and physical responses to anxiety. Q Month 04/02/25 --    Intervention Details: Duration of treatment until remission of symptoms.                Problem: Depression  Start Date: 04/02/25      Problem Details: The patient self-scales this problem as a 5 with 10 being the worst.          Goal Priority Start Date Expected End Date End Date    Patient will demonstrate the ability to initiate new constructive life skills outside of sessions on a consistent basis. -- 04/02/25 10/01/25 --    Goal Details: Progress toward goal:  The patient self-scales their progress related to this goal as a 4-5 with 10 being the worst.  Good and bad days.  Continues to struggle with lethargy, low motivation when she does not have other things to do.        Goal Intervention Frequency Start Date End Date    Assist patient in setting attainable activities of daily living goals. PRN 04/02/25 --      Goal Intervention Frequency Start Date End Date    Provide education about depression PRN 04/02/25 --     Intervention Details: Duration of treatment until remission of symptoms.        Goal Intervention Frequency Start Date End Date    Assist patient in developing healthy coping strategies. Each Visit 04/02/25 --    Intervention Details: Duration of treatment until remission of symptoms.                Problem: Co-Dependency  Start Date: 04/02/25      Problem Details: The patient self-scales this problem as a 5 with 10 being the worst.          Goal Priority Start Date Expected End Date End Date    Patient will demonstrate ability to set healthy boundaries and meet own needs within a relationship. -- 04/02/25 10/01/25 --    Goal Details: Progress toward goal:  The patient self-scales their progress related to this goal as a 3-5 with 10 being the worst.  Has been able to  respond  with less reaction to husbands behaviors.          Goal Intervention Frequency Start Date End Date    Assist patient in identifying enabling behaviors and healthy boundaries within relationships. Each Visit 04/02/25 --    Intervention Details: Duration of treatment until remission of symptoms.                        Reviewed By       Yumiko Alejandre LCSW 04/02/25 1202    Yumiko Alejandre LCSW 04/02/25 1157                Patient has been having more success at letting fo of husbands behaviors-responding and not reacting.  She has tried to stay connected with her children/grandchildren with some success. Reports that she is tired all of the time  And isn't sure if its medical or not.  I have discussed and reviewed this treatment plan with the patient. Patient gives verbal approval to the plan

## 2025-04-30 ENCOUNTER — TELEMEDICINE (OUTPATIENT)
Dept: PSYCHIATRY | Facility: CLINIC | Age: 75
End: 2025-04-30
Payer: MEDICARE

## 2025-04-30 DIAGNOSIS — F41.1 GAD (GENERALIZED ANXIETY DISORDER): Primary | ICD-10-CM

## 2025-04-30 DIAGNOSIS — F34.1 DYSTHYMIC DISORDER: ICD-10-CM

## 2025-04-30 NOTE — PROGRESS NOTES
[facility]  Outpatient Telehealth Progress Note   Patient Status:  Established  Name:  Maggie Emery  :  1950  Date of Service: 2025  Time In: 10:56 EDT  Time Out: 11:54     HIPAA: You have chosen to receive care through a video visit today. This provider is located at home address in connection with the [facility] ([facility]), [address], [place] [address] The Patient is seen remotely via telehealth in her car [facility], using UNI5/leaselock platforms and is in a secure environment for this session. The patient's condition being diagnosed/treated is appropriate for telemedicine. The provider identified herself and credentials LCSW, GOPALDC.  The patient and/or guardian consent(s) to be seen remotely, and when consent is given, she understand(s) consent allows for patient identifiable information to be sent to a third party as needed. Maggie can refuse to be seen remotely at any time. The electronic data is encrypted and password protected, and the patient has been advised of the potential risks to privacy, not withstanding such measures. The patient has signed the video visit consent form.  The visit included audio and video interaction. No technical issues occurred during this visit.     Verified the patients identify using Name and date of birth. Patient states there are no changes the their insurance.     IDENTIFYING INFORMATION:  The patient is a 75 y.o. female who presents for  an outpatient follow-up appointment.      I, Maggie Liliana Rupert, hereby acknowledge that I have the right to discuss the assessment, potential risks, and benefits of any recommended treatment.    Chief Complaint: Patient reports problems with depression and anxiety.     Session Goal:  Patient with explore and process thoughts/feelings/coping skills to prevent deterioration of mood and need for a higher level of  care.    Subjective   HPI:    History of Present Illness  The patient presents via virtual visit for follow up for generalized anxiety disorder and dysthymic disorder.  Patient arrived for session on time, clean and casually dressed without evidence of intoxication, withdrawal, or perceptual disturbance. Patient arrived as: age appropriate.  Patient indicates she is an open and willing participant in today's session.         The chief complaint is ongoing anxiety exacerbated by recent family events. She reports a recent visit from her son, which was the first in several years, prompted by the death of a close friend in 02/2025. During this visit, her son and  reconciled some differences which was positive. However, a stressful incident occurred when her son's 24-year-old son was involved in a car accident, and he had to return home which [place].   Additionally, her oldest daughter has continued to be distant and uncommunicative, leading to further anxiety. Dynamics of this daughters/son in law's relationship 18 yr old son (patients grandson) continues to be poor, both thinking that they are right and will not forgive to be able to move on.  This is something patient has struggled to let go of since she learned of grandson leaving the house to live with his  after huge argument with Dad-lesser degree mom.  She worries about her grandson who is talented athlete and has offers for scholarship to play football and when asked he told her not to worry.  The lack of information is unsettling about how will he pay what the scholarship doesn't. Her grandson, who is about to graduate, is exhibiting signs of stress and anger, and there is concern about his behavior towards his parents and his lack of respect for them. She is also worried about her granddaughter, who is experiencing anxiety attacks. The patient feels overwhelmed by these family issues and is struggling to cope with them. She has been  praying for guidance but feels she is not receiving any answers.    Substance Use: very occasional alcohol use    Recent labs:    Last Urine Toxicity           No data to display              Objective    Medical History: Areas Reviewed: The following portions of the patient's history were reviewed and updated as appropriate: allergies, current medications, past family history, past medical history, past social history, past surgical history, and problem list.    Vitals:  Weight:  No Weight Documented This Encounter  Height: No Height Documented This Encounter  BMI:  There is no height or weight on file to calculate BMI.  Education:  The benefits of a healthy diet and exercise were discussed with patient, especially the positive effects they have on mental health. Patient encouraged to consider lifestyle modification regarding  diet and exercise patterns to maximize results of mental health treatment.    Medication Review:    Current Outpatient Medications:     Crestor 20 MG tablet, Take 1 tablet by mouth Daily., Disp: , Rfl:     DULoxetine (CYMBALTA) 60 MG capsule, Take 1 capsule by mouth Daily., Disp: 90 capsule, Rfl: 0    eszopiclone (Lunesta) 2 MG tablet, Take 1 tablet by mouth At Night As Needed for Sleep. Take immediately before bedtime, Disp: 30 tablet, Rfl: 2    fluticasone (FLONASE) 50 MCG/ACT nasal spray, Flonase Allergy Relief 50 mcg/actuation nasal spray,suspension  2 sprays each nostril, Disp: , Rfl:     Fluticasone Furoate-Vilanterol (BREO ELLIPTA) 200-25 MCG/ACT inhaler, , Disp: , Rfl:     losartan-hydrochlorothiazide (HYZAAR) 100-25 MG per tablet, Take 1 tablet by mouth Daily., Disp: , Rfl:     Multiple Vitamins-Minerals (Systane ICaps AREDS2) capsule, ICaps AREDS2, Disp: , Rfl:     NIFEdipine XL (PROCARDIA XL) 60 MG 24 hr tablet, Take 1 tablet by mouth Daily., Disp: , Rfl:     Potassium Chloride (KCL-20 PO), Take 20 tablets by mouth Daily., Disp: , Rfl:     potassium chloride (KLOR-CON M20) 20 MEQ  CR tablet, Take 1 tablet by mouth Daily., Disp: , Rfl:     rizatriptan MLT (MAXALT-MLT) 5 MG disintegrating tablet, Take 1 tablet by mouth 1 (One) Time As Needed for Migraine. May repeat in 2 hours if needed, Disp: , Rfl:     solifenacin (VESICARE) 5 MG tablet, Daily., Disp: , Rfl:     Toprol XL 50 MG 24 hr tablet, Take 1 tablet by mouth., Disp: , Rfl:      Medication Compliance:  compliance with medication regimen    Assessment & Plan      Trauma Assessment:  Patient denies having been hit, slapped, kicked or otherwise physically hurt by others since last visit as well as having been forced to engage in any unwanted sexual acts since last visit.       Risk Assessment:  Patient adamantly and convincingly denies having SI/HI with or without intent, plans, or means. Patient denies having Hallucinations/Illusions and Delusions.  Patient  denies self-harming behaviors      Risk Level: History obtained from: patient and chart review.  Due to historical context and reported clinical markers, it appears patient meets criteria for LOW RISK to engage in self-harm or harm to others.  It is recommended Maggie be evaluated and assessed each contact for intent, plan, means and/or lethality each contact.    Behavior Health Review Of Systems:  Psychiatric/Behavioral: Negative for agitation, behavioral problems, decreased concentration, hallucinations, self-injury, sleep disturbance, suicidal ideas, negative for hyperactivity. Positive for dysphoric mood and stress. The patient is nervous/anxious.    Pertinent items are noted in HPI.    MENTAL STATUS EXAM   General Appearance:  Cleanly groomed and dressed  Eye Contact:  Good eye contact  Attitude:  Cooperative  Motor Activity:  Other  Other Comment:  Video visit sitting in stationary car  Speech:  Normal rate, tone, volume  Language:  Spontaneous  Mood and affect:  Normal, pleasant  Hopelessness:  Denies  Loneliness: 3  Thought Process:  Logical, goal-directed and  linear  Associations/ Thought Content:  No delusions  Hallucinations:  None  Suicidal Ideations:  Not present  Homicidal Ideation:  Not present  Sensorium:  Alert  Orientation:  Person, place, time and situation  Immediate Recall, Recent, and Remote Memory:  Other  Other Comment:  Grossly intact  Attention Span/ Concentration:  Easily distracted  Fund of Knowledge:  Appropriate for age and educational level  Insight:  Good  Judgement:  Good  Reliability:  Good  Impulse Control:  Good       Treatment plan status:  Active   Treatment plan progress: Ongoing  Prognosis: Fair with Ongoing Treatment   Functional Status: Mild impairment   Disposition:   Patient does not appear to be malingering.     Visit Diagnosis/Plan    ICD-10-CM ICD-9-CM   1. MIRIAM (generalized anxiety disorder)  F41.1 300.02   2. Dysthymic disorder  F34.1 300.4     Assessment & Plan  Problems:  - Generalized anxiety disorder  - Dysthymic disorder    Content of Therapy:  During the session, the patient discussed various family dynamics and conflicts, including issues with her son and daughter-in-law, and her grandchildren's activities. The patient expressed feelings of sadness and frustration regarding her strained relationships and the challenges of managing her family's expectations. The conversation also touched on the patient's concerns about her grandchildren's busy schedules and her daughter's inability to communicate effectively. Techniques for reframing thoughts and exploring feelings were utilized, and conflict resolution strategies were discussed.    Clinical Impression:  The patient continues to experience significant anxiety and depressive symptoms, exacerbated by ongoing family conflicts and communication issues. Despite these challenges, she demonstrates insight into her emotions and a willingness to engage in therapeutic interventions. Her anxiety appears to be heightened by her concerns for her family's well-being and her perceived lack  of control over their actions. The patient reports feeling overwhelmed and sad but remains committed to improving her mental health through therapy.    Therapeutic Intervention:  - Cognitive-behavioral strategies were employed to help the patient reframe negative thoughts and focus on positive interactions.  - Mindfulness exercises were introduced to assist the patient in managing her anxiety and staying present.  - Psychoeducation was provided on effective communication techniques and conflict resolution within family dynamics.    Plan:  - Continue practicing mindfulness techniques daily.  - Maintain open communication with others as able, expressing love and support.  - Engage in activities that bring blessing and fulfillment, such as hobbies or outdoor activities.  - Seek support from a support groups to help manage symptoms.  -     Notes & Risk Factors:  - No immediate risk factors for harm to self or others identified.  - Protective factors include strong sylvie, supportive daughter, and engagement in therapy.        Justification for therapy: Patient continues to struggle with a chronic/pervasive mental illness which continues to cause impairment in at least two important areas of functioning.  Patient appear(s) to maintain relative stability as compared to the  baseline measure.  Patient can reasonably be expected to continue to benefit from treatment and would likely be at increased risk for decompensation if treatment were stopped.  Patient endorses a positive benefit from therapy and appears to meet outpatient level of care. Patient expresses gratitude and reports she had a positive experience today.    Recommended Referrals: Psychiatrist/APRN and Medical Provider (PCP)    Follow Up:  Return in about 4 weeks, or earlier if symptoms worsen or fail to improve for next follow up visit. [phone]  Review progress on mindfulness practice, discuss family communication efforts, and address any new or ongoing  concerns.    Future Appointments         Provider Department Center    4/30/2025 11:00 AM Yumiko Alejandre LCSW [facility]    5/22/2025 1:30 PM Haim Hill MD [facility]    5/29/2025 11:00 AM Yumiko Alejandre LCSW [facility]    6/30/2025 11:00 AM Yumiko Alejandre LCSW [facility]    8/11/2025 2:00 PM Daniel Ziegler MD [facility]            This document electronically signed by Yumiko Miles LCSW, [facility] April 30, 2025 10:56 EDT    Patient or patient representative verbalized consent for the use of Ambient Listening during the visit with  Yumiko Miles LCSW for chart documentation. 4/30/2025  11:06 EDT    At [facility], we believe that sharing information builds trust and better relationships. You are receiving this note because you are receiving care at [facility] or have recently visited. It is possible you will see health information before a provider has talked with you about it. This kind of information can be easy to misunderstand as it is a medical document. It is intended as fufb-[id number] communication. It is written in medical language and may contain abbreviations or verbiage that are unfamiliar. It may appear blunt or direct. Medical documents are intended to carry relevant information, facts as evident, and the clinical opinion of the practitioner.  To help you fully understand what it means for your health, we urge you to discuss this note with your provider

## 2025-05-27 ENCOUNTER — OFFICE VISIT (OUTPATIENT)
Dept: PSYCHIATRY | Facility: CLINIC | Age: 75
End: 2025-05-27
Payer: MEDICARE

## 2025-05-27 VITALS
HEIGHT: 61 IN | SYSTOLIC BLOOD PRESSURE: 128 MMHG | HEART RATE: 77 BPM | BODY MASS INDEX: 32.21 KG/M2 | WEIGHT: 170.6 LBS | DIASTOLIC BLOOD PRESSURE: 70 MMHG | OXYGEN SATURATION: 95 %

## 2025-05-27 DIAGNOSIS — F34.1 DYSTHYMIC DISORDER: ICD-10-CM

## 2025-05-27 DIAGNOSIS — F51.01 PRIMARY INSOMNIA: ICD-10-CM

## 2025-05-27 DIAGNOSIS — F41.1 GAD (GENERALIZED ANXIETY DISORDER): ICD-10-CM

## 2025-05-27 PROCEDURE — 3074F SYST BP LT 130 MM HG: CPT | Performed by: PSYCHIATRY & NEUROLOGY

## 2025-05-27 PROCEDURE — 3078F DIAST BP <80 MM HG: CPT | Performed by: PSYCHIATRY & NEUROLOGY

## 2025-05-27 PROCEDURE — 99214 OFFICE O/P EST MOD 30 MIN: CPT | Performed by: PSYCHIATRY & NEUROLOGY

## 2025-05-27 RX ORDER — ESZOPICLONE 2 MG/1
2 TABLET, FILM COATED ORAL NIGHTLY PRN
Qty: 30 TABLET | Refills: 2 | Status: SHIPPED | OUTPATIENT
Start: 2025-05-27 | End: 2026-05-27

## 2025-05-27 RX ORDER — DULOXETIN HYDROCHLORIDE 60 MG/1
60 CAPSULE, DELAYED RELEASE ORAL DAILY
Qty: 90 CAPSULE | Refills: 0 | Status: SHIPPED | OUTPATIENT
Start: 2025-05-27

## 2025-05-27 NOTE — PROGRESS NOTES
"Patient ID: Maggie Emery is a 75 y.o. female    SERVICE TYPE: EVALUATION AND MANAGEMENT (greater than 50% of the time spent for supportive psychotherapy).      /70   Pulse 77   Ht 156.2 cm (61.5\")   Wt 77.4 kg (170 lb 9.6 oz)   SpO2 95%   BMI 31.72 kg/m²     ALLERGIES:  Ambien [zolpidem tartrate], Darvon [propoxyphene], Hydrocodone, and Ibuprofen    CC/ Focus of the visit: dysthymia    HPI: Patient's chronic dysphoria with associated negative cognitions  continues without recurrent episodes of clinical major depression.  Patient continues to work with her therapist which has been beneficial for the patient maintaining a functional level of activity and interpersonal interactions.  Recent stressors included her 's ER visit with atrial fibrillation and her grandson Aries's graduation.  Patient also stressed recently with the near [facility] experience.  She reports that she is been sleeping normally till this past week, partial insomnia she relates to the recent stresses.  Discussed continuing current medications, no indication of prescription misuse or substance abuse.  Did mention or possibly intensifying her outpatient psychotherapeutic effort with the IOP here, she is to consider this along with consulting Ms. Palencia her current therapist.    PFSH: At least for now seems that she has been working harmoniously with her  regarding his health issues.    Review of Systems  No cardiopulmonary, GI or neurological complaints.  Continuing issues with her right knee, is in treatment with her orthopedist.    SUPPORTIVE PSYCHOTHERAPY: continuing efforts to promote the therapeutic alliance, address the patient’s issues, and strengthen self awareness, insights, and positive coping skills such as Exercising, listen to music, spending time in nature and utilizing resources.     Mental Status Exam  Appearance:  appropriate  Attitude toward clinician:  cooperative and agreeable   Speech: "    Rate:  regular rate and rhythm   Volume: normal  Motor:  no abnormal movements   Mood:  Good  Affect:  euthymic  Thought Processes:  linear, logical, and goal directed  Thought Content:  Normal   Feeling Hopeless: absent  Suicidal Thoughts or Intent:  absent  Homicidal Thoughts:  absent  Perceptual Disturbance: no perceptual disturbance  Attention and Concentration:  good  Insight and Judgement:  good  Memory:  memory appears to be intact    LABS: No results found for this or any previous visit (from the past week).    MEDICATION ISSUES: Have discussed with the patient the medications Risks, Benefits, and Side effects including potential falls, possible impaired driving and  metabolic adversities among others. No medication side effects or related complaints today.     TREATMENT PLAN/GOALS: Continue supportive psychotherapy efforts and medications as indicated.     Current Outpatient Medications   Medication Sig Dispense Refill    Crestor 20 MG tablet Take 1 tablet by mouth Daily.      DULoxetine (CYMBALTA) 60 MG capsule Take 1 capsule by mouth Daily. 90 capsule 0    eszopiclone (Lunesta) 2 MG tablet Take 1 tablet by mouth At Night As Needed for Sleep. Take immediately before bedtime 30 tablet 2    fluticasone (FLONASE) 50 MCG/ACT nasal spray Flonase Allergy Relief 50 mcg/actuation nasal spray,suspension   2 sprays each nostril      Fluticasone Furoate-Vilanterol (BREO ELLIPTA) 200-25 MCG/ACT inhaler       losartan-hydrochlorothiazide (HYZAAR) 100-25 MG per tablet Take 1 tablet by mouth Daily.      Multiple Vitamins-Minerals (Systane ICaps AREDS2) capsule ICaps AREDS2      NIFEdipine XL (PROCARDIA XL) 60 MG 24 hr tablet Take 1 tablet by mouth Daily.      Potassium Chloride (KCL-20 PO) Take 20 tablets by mouth Daily.      potassium chloride (KLOR-CON M20) 20 MEQ CR tablet Take 1 tablet by mouth Daily.      rizatriptan MLT (MAXALT-MLT) 5 MG disintegrating tablet Take 1 tablet by mouth 1 (One) Time As Needed for  Migraine. May repeat in 2 hours if needed      solifenacin (VESICARE) 5 MG tablet Daily.      Toprol XL 50 MG 24 hr tablet Take 1 tablet by mouth.       No current facility-administered medications for this visit.       COLLATERAL PSYCHOTHERAPEUTIC INTERVENTION: patient is continuing with Yumiko ESPINOZAW,  has proven to be very beneficial in maintenance of the patient's functional capacity.    RISK:  moderate    Assessment & Plan     Diagnoses and all orders for this visit:    1. MIRIAM (generalized anxiety disorder)  -     DULoxetine (CYMBALTA) 60 MG capsule; Take 1 capsule by mouth Daily.  Dispense: 90 capsule; Refill: 0    2. Dysthymic disorder  -     DULoxetine (CYMBALTA) 60 MG capsule; Take 1 capsule by mouth Daily.  Dispense: 90 capsule; Refill: 0    3. Primary insomnia  -     eszopiclone (Lunesta) 2 MG tablet; Take 1 tablet by mouth At Night As Needed for Sleep. Take immediately before bedtime  Dispense: 30 tablet; Refill: 2        Return in about 3 months (around 8/27/2025).           Patient knows to call if symptoms worsen or fail to improve between appointments.    I spent 30 minutes caring for Maggie on this date of service. This time includes time spent by me in the following activities: Patient evaluation, support psychotherapy, decisions, medications, and documentation.     Dictated utilizing Intertwine dictation    MAKSIM Hill MD   Quality 402: Tobacco Use And Help With Quitting Among Adolescents: Patient screened for tobacco and never smoked Quality 130: Documentation Of Current Medications In The Medical Record: Current Medications Documented Quality 431: Preventive Care And Screening: Unhealthy Alcohol Use - Screening: Patient screened for unhealthy alcohol use using a single question and scores less than 2 times per year Detail Level: Detailed

## 2025-05-29 ENCOUNTER — TELEMEDICINE (OUTPATIENT)
Dept: PSYCHIATRY | Facility: CLINIC | Age: 75
End: 2025-05-29
Payer: MEDICARE

## 2025-05-29 DIAGNOSIS — F41.1 GAD (GENERALIZED ANXIETY DISORDER): Primary | ICD-10-CM

## 2025-05-29 DIAGNOSIS — F34.1 DYSTHYMIC DISORDER: ICD-10-CM
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
## 2025-06-30 ENCOUNTER — TELEMEDICINE (OUTPATIENT)
Dept: PSYCHIATRY | Facility: CLINIC | Age: 75
End: 2025-06-30
Payer: MEDICARE

## 2025-06-30 DIAGNOSIS — F34.1 DYSTHYMIC DISORDER: ICD-10-CM

## 2025-06-30 DIAGNOSIS — F41.1 GAD (GENERALIZED ANXIETY DISORDER): Primary | ICD-10-CM

## 2025-06-30 PROCEDURE — 1160F RVW MEDS BY RX/DR IN RCRD: CPT | Performed by: SOCIAL WORKER

## 2025-06-30 PROCEDURE — 1159F MED LIST DOCD IN RCRD: CPT | Performed by: SOCIAL WORKER

## 2025-06-30 PROCEDURE — 90837 PSYTX W PT 60 MINUTES: CPT | Performed by: SOCIAL WORKER

## 2025-06-30 NOTE — PROGRESS NOTES
[facility] 2  Outpatient Telehealth Progress Note   Patient Status:  Established  Name:  Maggie Emery  :  1950  Date of Service: 2025  Time In: 11:04 EDT  Time Out: 11:59     HIPAA: You have chosen to receive care through a video visit today. This provider is located at home address in connection with the [facility] (through [facility]), [address] [place] [address] The Patient is seen remotely via telehealth at home ([address] [place] [address] [place] [address]) using [employer] platforms and is in a secure environment for this session. The patient's condition being diagnosed/treated is appropriate for telemedicine. The provider identified herself and credentials LCSW, GOPALDC.  The patient consent(s) to be seen remotely, and when consent is given, she understand(s) consent allows for patient identifiable information to be sent to a third party as needed. Maggie can refuse to be seen remotely at any time. The electronic data is encrypted and password protected, and the patient has been advised of the potential risks to privacy, not withstanding such measures.  The patient has signed the video visit consent form.  The visit included audio and video interaction. No technical issues occurred during this visit.     Verified the patients identify using Name and date of birth. Patient states there are no changes the their insurance.     IDENTIFYING INFORMATION:  The patient is a 75 y.o. female who presents for  an outpatient follow-up appointment.      I, Maggie Liliana Rupert, hereby acknowledge that I have the right to discuss the assessment, potential risks, and benefits of any recommended treatment.    Chief Complaint: Patient reports problems with depression and anxiety.     Session Goal:  Patient with explore and process thoughts/feelings/coping skills to prevent deterioration of mood and need for a higher level of care.    Subjective           History of Present Illness  Patient presents for a virtual follow up therapy session, on time, clean and casually dressed without evidence of intoxication, withdrawal, or perceptual disturbance. Patient arrived as: age appropriate.  Patient indicates she is an open and willing participant in today's session.     She reports experiencing both good and bad days, with a particular struggle in maintaining a regular sleep pattern. Her daily activities are limited to watering her plants and minor gardening tasks. She has been visiting her daughter's house once or twice a day, but not on a daily basis. She expresses disappointment over the perceived distance from her children. She has been making efforts to engage with her grandchildren, but her 11-year-old grandchild has been resistant. She is able to walk short distances, such as going to the grocery store, as long as she has a shopping cart for support.     Her sleep duration is approximately 5 hours per night. She reports low energy levels and occasional anxiety related to her children's behavior. She is not concerned about the upcoming surgery itself, but rather about the anesthesia, as she had a previous adverse reaction during a stomach surgery last year. She is scheduled for a surgery on 06/28/2025. Depression Decreased/Increased sleep, Feelings of guilt/worthlessness, and Low energy  Generalized Anxiety  Excess Worry, Easily fatigued, Muscle tension, and Decreased sleep. Onset of symptoms was vague.  Symptoms are associated with financial burdens and lack of support.  Symptoms are aggravated by anxiety, lonely, sadness, and stress.   Symptoms improve with medication management, therapy, and personal self-care (wellness) Current rates severity of symptoms, on a scale of 1-10 (10 is the most severe) 5 Context Family and social history was reviewed  Quality improved.    Recent labs:    Last Urine Toxicity           No data to display              Objective     Medical History: Areas Reviewed: The following portions of the patient's history were reviewed and updated as appropriate: allergies, current medications, past family history, past medical history, past social history, past surgical history, and problem list.    Vitals:  Weight:  No Weight Documented This Encounter  Height: No Height Documented This Encounter  BMI:  There is no height or weight on file to calculate BMI.  Education:  The benefits of a healthy diet and exercise were discussed with patient, especially the positive effects they have on mental health. Patient encouraged to consider lifestyle modification regarding  diet and exercise patterns to maximize results of mental health treatment.    Medication Review:    Current Outpatient Medications:     Crestor 20 MG tablet, Take 1 tablet by mouth Daily., Disp: , Rfl:     DULoxetine (CYMBALTA) 60 MG capsule, Take 1 capsule by mouth Daily., Disp: 90 capsule, Rfl: 0    eszopiclone (Lunesta) 2 MG tablet, Take 1 tablet by mouth At Night As Needed for Sleep. Take immediately before bedtime, Disp: 30 tablet, Rfl: 2    fluticasone (FLONASE) 50 MCG/ACT nasal spray, Flonase Allergy Relief 50 mcg/actuation nasal spray,suspension  2 sprays each nostril, Disp: , Rfl:     Fluticasone Furoate-Vilanterol (BREO ELLIPTA) 200-25 MCG/ACT inhaler, , Disp: , Rfl:     losartan-hydrochlorothiazide (HYZAAR) 100-25 MG per tablet, Take 1 tablet by mouth Daily., Disp: , Rfl:     Multiple Vitamins-Minerals (Systane ICaps AREDS2) capsule, ICaps AREDS2, Disp: , Rfl:     NIFEdipine XL (PROCARDIA XL) 60 MG 24 hr tablet, Take 1 tablet by mouth Daily., Disp: , Rfl:     Potassium Chloride (KCL-20 PO), Take 20 tablets by mouth Daily., Disp: , Rfl:     potassium chloride (KLOR-CON M20) 20 MEQ CR tablet, Take 1 tablet by mouth Daily., Disp: , Rfl:     rizatriptan MLT (MAXALT-MLT) 5 MG disintegrating tablet, Take 1 tablet by mouth 1 (One) Time As Needed for Migraine. May repeat in 2 hours if  needed, Disp: , Rfl:     solifenacin (VESICARE) 5 MG tablet, Daily., Disp: , Rfl:     Toprol XL 50 MG 24 hr tablet, Take 1 tablet by mouth., Disp: , Rfl:      Medication Compliance:  compliance with medication regimen;   Assessment & Plan      Trauma Assessment:  Patient denies having been hit, slapped, kicked or otherwise physically hurt by others since last visit as well as having been forced to engage in any unwanted sexual acts since last visit.       Risk Assessment:  Patient adamantly and convincingly denies having SI/HI with or without intent, plans, or means. Patient denies having Hallucinations/Illusions and Delusions.  Patient  denies self-harming behaviors      Notes & Risk Factors:  - Risk factors: Anxiety attacks, frustration with family dynamics.  - Protective factors: Engagement in gardening and spending time with grandchildren.    Risk Level: History obtained from: patient and chart review.  Due to historical context and reported clinical markers, it appears patient meets criteria for LOW RISK to engage in self-harm or harm to others. Baseline risk is increased compared to general population due to significant psychiatric comorbidity. It is recommended Maggie be evaluated and assessed each contact for intent, plan, means and/or lethality each contact.    Behavior Health Review Of Systems:  Psychiatric/Behavioral: Negative for agitation, behavioral problems, decreased concentration, dysphoric mood, hallucinations, self-injury, sleep disturbance, suicidal ideas, negative for hyperactivity. Positive for depressed mood and stress. The patient is nervous/anxious.    Pertinent items are noted in HPI.    MENTAL STATUS EXAM   General Appearance:  Cleanly groomed and dressed  Eye Contact:  Good eye contact  Attitude:  Cooperative  Motor Activity:  Other  Other Comment:  Video visit  Speech:  Normal rate, tone, volume  Language:  Spontaneous  Mood and affect:  Anxious  Hopelessness:  Denies  Loneliness:  Denies  Thought Process:  Goal-directed, linear and logical  Associations/ Thought Content:  No delusions  Hallucinations:  None  Suicidal Ideations:  Not present  Homicidal Ideation:  Not present  Sensorium:  Alert  Orientation:  Person, place, time and situation  Immediate Recall, Recent, and Remote Memory:  Other  Other Comment:  Grossly intake  Attention Span/ Concentration:  Easily distracted  Fund of Knowledge:  Appropriate for age and educational level  Insight:  Good  Judgement:  Good  Reliability:  Good  Impulse Control:  Good     Treatment plan status:  Active   Treatment plan progress: Progressing  Prognosis: Fair with Ongoing Treatment   Functional Status: Mild impairment   Disposition:   Patient does not appear to be malingering.      Visit Diagnosis/Plan    ICD-10-CM ICD-9-CM   1. MIRIAM (generalized anxiety disorder)  F41.1 300.02   2. Dysthymic disorder  F34.1 300.4     Assessment & Plan  Problems:  - Anxiety  - Oppositional behavior in granddaughter    Content of Therapy:  During the session, the patient discussed her granddaughter's oppositional behavior and anxiety about transitioning to middle school. The patient expressed concerns about her granddaughter's apprehension and panic attacks. The conversation also covered the patient's own anxiety, particularly when visiting her daughter's house, and her feelings of disappointment regarding her children's distant behavior. The patient shared her coping strategies, such as engaging in activities like gardening and spending time with her grandchildren.    Clinical Impression:  The patient continues to experience anxiety, particularly in social situations involving her family. She reports feeling overwhelmed and frustrated by her granddaughter's oppositional behavior and her children's distant attitudes. Despite these challenges, the patient demonstrates resilience and utilizes coping mechanisms, such as gardening and spending time with her grandchildren,  to manage her anxiety. Her mental health appears stable, although she experiences low energy and occasional anxiety attacks.    Therapeutic Intervention: Encouraged the patient to discuss/vent their feelings, frustrations, and fears concerning their ongoing issues and validated their feelings. Assisted patient in processing above session content; acknowledged and normalized patient’s thoughts, feelings, and concerns.     The patient is encouraged to practice deep breathing exercises to manage anxiety. She is advised to engage in activities that provide distraction and relaxation, such as gardening and spending time with her grandchildren. Reframing thoughts and exploring feelings about her children's behavior were discussed to help the patient cope with her disappointment and frustration.    Clinical Maneuvering:  The benefits of a healthy diet and exercise were discussed with patient, especially the positive effects they have on mental health. Patient encouraged to consider lifestyle modification regarding  diet and exercise patterns to maximize results of mental health treatment.  Reviewed previous available documentation  Reviewed most recent available labs    Plan:  - Continue practicing deep breathing exercises.  - Engage in activities that distract from anxiety, such as gardening and spending time with grandchildren.  - Reframe thoughts and explore feelings regarding children's behavior.  - Consider visiting the library for activities with grandchildren.    Justification for therapy: Patient continues to struggle with a chronic/pervasive mental illness which continues to cause impairment in at least two important areas of functioning.  Patient appear(s) to maintain relative stability as compared to the  baseline measure.  Patient can reasonably be expected to continue to benefit from treatment and would likely be at increased risk for decompensation if treatment were stopped.  Patient endorses a positive  benefit from therapy and appears to meet outpatient level of care. Patient expresses gratitude and reports she had a positive experience today.    Recommended Referrals: Psychiatrist/APRN and Medical Provider (PCP)    Follow Up:  Return in about 4 weeks, or earlier if symptoms worsen or fail to improve for next follow up visit. [phone]  Goals include managing anxiety and improving coping strategies.    Future Appointments         Provider Department Center    7/30/2025 11:00 AM Yumiko Alejandre LCSW [facility]    8/11/2025 2:00 PM (Arrive by 1:45 PM) Daniel Ziegler MD [facility]    8/19/2025 1:00 PM (Arrive by 12:45 PM) Haim Hill MD [facility]            This document electronically signed by Yumiko Miles LCSW, [facility] [place] June 30, 2025 11:11 EDT    Patient or patient representative verbalized consent for the use of Ambient Listening during the visit with  Yumiko Miles LCSW for chart documentation. 6/30/2025  11:05 EDT    At [facility], we believe that sharing information builds trust and better relationships. You are receiving this note because you are receiving care at [facility] or have recently visited. It is possible you will see health information before a provider has talked with you about it. This kind of information can be easy to misunderstand as it is a medical document. It is intended as [id number] communication. It is written in medical language and may contain abbreviations or verbiage that are unfamiliar. It may appear blunt or direct. Medical documents are intended to carry relevant information, facts as evident, and the clinical opinion of the practitioner.  To help you fully understand what it means for your health, we urge you to discuss this note with your provider

## 2025-06-30 NOTE — TREATMENT PLAN
Multi-Disciplinary Problems (from Behavioral Health Treatment Plan)      Active Problems       Problem: Anxiety  Start Date: 06/30/25      Problem Details: The patient self-scales this problem as a 5 with 10 being the worst.          Goal Priority Start Date Expected End Date End Date    Patient will develop and implement behavioral and cognitive strategies to reduce anxiety and irrational fears. -- 06/30/25 12/29/25 --    Goal Details: Progress toward goal:  The patient self-scales their progress related to this goal as a 4-5 with 10 being the worst.        Goal Intervention Frequency Start Date End Date    Help patient explore past emotional issues in relation to present anxiety. Each Visit 06/30/25 --    Intervention Details: Duration of treatment until discharged.        Goal Intervention Frequency Start Date End Date    Help patient develop an awareness of their cognitive and physical responses to anxiety. Each Visit 06/30/25 --    Intervention Details: Duration of treatment until discharged.                Problem: Co-Dependency  Start Date: 06/30/25      Problem Details: The patient self-scales this problem as a 5 with 10 being the worst.          Goal Priority Start Date Expected End Date End Date    Patient will demonstrate ability to set healthy boundaries and meet own needs within a relationship. -- 06/30/25 12/29/25 --    Goal Details: Progress toward goal:  The patient self-scales their progress related to this goal as a 3 with 10 being the worst.        Goal Intervention Frequency Start Date End Date    Assist patient in identifying enabling behaviors and healthy boundaries within relationships. Each Visit 06/30/25 --    Intervention Details: Duration of treatment until remission of symptoms.                Problem: Depression  Start Date: 06/30/25      Problem Details: The patient self-scales this problem as a 5 with 10 being the worst.          Goal Priority Start Date Expected End Date End Date     Patient will demonstrate the ability to initiate new constructive life skills outside of sessions on a consistent basis. -- 06/30/25 12/29/25 --    Goal Details: Progress toward goal:  The patient self-scales their progress related to this goal as a 4 with 10 being the worst.        Goal Intervention Frequency Start Date End Date    Assist patient in setting attainable activities of daily living goals. PRN 06/30/25 --      Goal Intervention Frequency Start Date End Date    Provide education about depression PRN 06/30/25 --    Intervention Details: Duration of treatment until remission of symptoms.        Goal Intervention Frequency Start Date End Date    Assist patient in developing healthy coping strategies. Each Visit 06/30/25 --    Intervention Details: Duration of treatment until remission of symptoms.                        Reviewed By       Yumiko Alejandre LCSW 06/30/25 1156    Yumiko Alejandre LCSW 06/30/25 1157                     I have discussed and reviewed this treatment plan with the patient.

## 2025-07-30 ENCOUNTER — TELEMEDICINE (OUTPATIENT)
Dept: PSYCHIATRY | Facility: CLINIC | Age: 75
End: 2025-07-30
Payer: MEDICARE

## 2025-07-30 DIAGNOSIS — F34.1 DYSTHYMIC DISORDER: ICD-10-CM

## 2025-07-30 DIAGNOSIS — F41.1 GAD (GENERALIZED ANXIETY DISORDER): Primary | ICD-10-CM

## 2025-07-30 NOTE — PROGRESS NOTES
[facility]  Outpatient Telehealth Progress Note   Patient Status:  Established  Name:  Maggie Emery  :  1950  Date of Service: 2025  Time In: 10:59 EDT  Time Out: 11: 47    HIPAA: You have chosen to receive care through a video visit today. This provider is located at home address in connection with the [facility] ([facility] [address] [place] [address] The Patient is seen remotely via telehealth at daughters home in [place] with daughter using Epic/[employer] platforms and is in a secure environment for this session. The patient's condition being diagnosed/treated is appropriate for telemedicine. The provider identified herself and credentials OLGAW, GOPALDC.  The patient consent(s) to be seen remotely, and when consent is given, she understand(s) consent allows for patient identifiable information to be sent to a third party as needed. Maggie can refuse to be seen remotely at any time. The electronic data is encrypted and password protected, and the patient has been advised of the potential risks to privacy, not withstanding such measures.  The patient has signed the video visit consent form.  The visit included audio and video interaction. No technical issues occurred during this visit.     Verified the patients identify using Name and date of birth. Patient states there are no changes the their insurance.     IDENTIFYING INFORMATION:  The patient is a 75 y.o. female who presents for  an outpatient follow-up appointment.      I, Maggie Emery, hereby acknowledge that I have the right to discuss the assessment, potential risks, and benefits of any recommended treatment.    Chief Complaint: Patient reports problems with depression and anxiety.     Session Goal:  Patient with explore and process thoughts/feelings/coping skills to prevent deterioration of mood and need for a higher level of care.    Subjective      History of Present Illness   Patient presents for a virtual follow up therapy session, on time, clean and casually dressed without evidence of intoxication, withdrawal, or perceptual disturbance. Patient arrived as: age appropriate.  Patient indicates she is an open and willing participant in today's session.      She underwent a right knee replacement surgery, which was more extensive than her previous left knee partial replacement. She is currently managing her pain with three different medications, including oxycodone, and reports that her pain is under better control today. She has been using an Ace wrap, ice pack, brace, and massaging machines to aid in circulation. She plans to return home on Friday and has arranged for her medications to be set out in small cups, as she often wakes up twice during the night to take them. She has a machine that helps bend her knee, but it is currently not functioning properly. She was advised to start home physical therapy, but due to an upcoming house move, she will begin this on Friday afternoon.  She has been sleeping more than usual, which she attributes to the anesthesia, but her sleep is often interrupted at night. She believes the pain medication is helping her sleep. She has been wearing shorts as she cannot get pants over the layers on her leg.    Interim History: She experienced some anxiety when she woke up with severe leg pain but found that oxycodone helped relax her. However, she cannot take her sleeping pill while on oxycodone.  Patient reports that she has experienced the following symptoms over the last 2 weeks; Depression Decreased/Increased sleep, Loss of Interest, and Feelings of guilt/worthlessness  Generalized Anxiety  Excess Worry, Restless/Edgy, Easily fatigued, Muscle tension, and Decreased sleep. Onset of symptoms was vague.  Symptoms are associated with lack of support.  Symptoms are aggravated by anxiety, lonely, sadness, and stress.   Symptoms  improve with medication management, therapy, and personal self-care (wellness) Current rates severity of symptoms, on a scale of 1-10 (10 is the most severe) 4-5 Context Family and social history was reviewed  Quality improved.  Social History:  - Plans to return home on Friday  - Home has stairs, but she can ascend them while seated if necessary    Somatic Symptoms:  knee pain    Substance Use: no current use  Recent labs:    Last Urine Toxicity           No data to display              Objective    Medical History: Areas Reviewed: The following portions of the patient's history were reviewed and updated as appropriate: allergies, current medications, past family history, past medical history, past social history, past surgical history, and problem list.    Vitals:  Weight:  No Weight Documented This Encounter  Height: No Height Documented This Encounter  BMI:  There is no height or weight on file to calculate BMI.  Education:  The benefits of a healthy diet and exercise were discussed with patient, especially the positive effects they have on mental health. Patient encouraged to consider lifestyle modification regarding  diet and exercise patterns to maximize results of mental health treatment.    Medication Review:    Current Outpatient Medications:     Crestor 20 MG tablet, Take 1 tablet by mouth Daily., Disp: , Rfl:     DULoxetine (CYMBALTA) 60 MG capsule, Take 1 capsule by mouth Daily., Disp: 90 capsule, Rfl: 0    eszopiclone (Lunesta) 2 MG tablet, Take 1 tablet by mouth At Night As Needed for Sleep. Take immediately before bedtime, Disp: 30 tablet, Rfl: 2    fluticasone (FLONASE) 50 MCG/ACT nasal spray, Flonase Allergy Relief 50 mcg/actuation nasal spray,suspension  2 sprays each nostril, Disp: , Rfl:     Fluticasone Furoate-Vilanterol (BREO ELLIPTA) 200-25 MCG/ACT inhaler, , Disp: , Rfl:     losartan-hydrochlorothiazide (HYZAAR) 100-25 MG per tablet, Take 1 tablet by mouth Daily., Disp: , Rfl:     Multiple  Vitamins-Minerals (Systane ICaps AREDS2) capsule, ICaps AREDS2, Disp: , Rfl:     NIFEdipine XL (PROCARDIA XL) 60 MG 24 hr tablet, Take 1 tablet by mouth Daily., Disp: , Rfl:     Potassium Chloride (KCL-20 PO), Take 20 tablets by mouth Daily., Disp: , Rfl:     potassium chloride (KLOR-CON M20) 20 MEQ CR tablet, Take 1 tablet by mouth Daily., Disp: , Rfl:     rizatriptan MLT (MAXALT-MLT) 5 MG disintegrating tablet, Take 1 tablet by mouth 1 (One) Time As Needed for Migraine. May repeat in 2 hours if needed, Disp: , Rfl:     solifenacin (VESICARE) 5 MG tablet, Daily., Disp: , Rfl:     Toprol XL 50 MG 24 hr tablet, Take 1 tablet by mouth., Disp: , Rfl:      Medication Compliance:  compliance with medication regimen;    Assessment & Plan      Trauma Assessment:  Patient denies having been hit, slapped, kicked or otherwise physically hurt by others since last visit as well as having been forced to engage in any unwanted sexual acts since last visit.       Risk Assessment:  Patient adamantly and convincingly denies having SI/HI with or without intent, plans, or means. Patient denies having Hallucinations/Illusions and Delusions.  Patient  denies self-harming behaviors   Risk Level: History obtained from: patient and chart review.  Due to historical context and reported clinical markers, it appears patient meets criteria for LOW RISK to engage in self-harm or harm to others. Baseline risk is increased compared to general population due to significant psychiatric comorbidity. It is recommended Maggie be evaluated and assessed each contact for intent, plan, means and/or lethality each contact.    Behavior Health Review Of Systems:  Psychiatric/Behavioral: Negative for agitation, behavioral problems,  dysphoric mood, hallucinations, self-injury,  suicidal ideas, negative for hyperactivity. Positive for depressed mood, decreased concentration, sleep disturbance, and stress. The patient is nervous/anxious.    Pertinent items are  noted in HPI.    MENTAL STATUS EXAM   General Appearance:  Cleanly groomed and dressed  Eye Contact:  Good eye contact  Attitude:  Cooperative  Motor Activity:  Other  Other Comment:  Virtual visit  Speech:  Normal rate, tone, volume  Language:  Spontaneous  Mood and affect:  Anxious  Hopelessness:  Denies  Loneliness: Denies  Thought Process:  Goal-directed, linear and logical  Associations/ Thought Content:  No delusions  Hallucinations:  None  Suicidal Ideations:  Not present  Homicidal Ideation:  Not present  Sensorium:  Alert  Orientation:  Person, place, time and situation  Immediate Recall, Recent, and Remote Memory:  Other  Other Comment:  Grossly intact  Attention Span/ Concentration:  Easily distracted  Fund of Knowledge:  Appropriate for age and educational level  Insight:  Good  Judgement:  Good  Reliability:  Good  Impulse Control:  Good       Treatment plan status:  Active   Treatment plan progress: Progressing  Prognosis: Fair with Ongoing Treatment   Functional Status: Mild impairment   Disposition:   Patient does not appear to be malingering.   Visit Diagnosis/Plan    ICD-10-CM ICD-9-CM   1. MIRIAM (generalized anxiety disorder)  F41.1 300.02   2. Dysthymic disorder  F34.1 300.4     Assessment & Plan  Problems:  - depression/dysthymia  - Anxiety    Content of Therapy:Encouraged the patient to discuss/vent their feelings, frustrations, and fears concerning their ongoing issues and validated their feelings.  Assisted patient in processing above session content; acknowledged and normalized patient’s thoughts, feelings, and concerns  The session covered the patient's postoperative recovery, including pain management and the use of supportive devices such as an Ace wrap, ice pack, brace, and massaging machines. The patient discussed her plan to start home physical therapy and the need to set alarms for medication times. Additionally, the conversation touched on her anxiety levels, relationships with family  members, worry with grandsons. The patient also shared personal anecdotes about family dynamics and care giving responsibilities.    Clinical Impression:  The patient appears to be managing her postoperative pain better today, as evidenced by her report of improved pain control after taking a pain pill. She is utilizing various supportive devices to aid in her recovery. Anxiety levels are reported to be about normal, though there was an increase in anxiety when experiencing severe leg pain remains anxious over  and other family members. The patient is currently unable to take her sleeping pill due to the use of oxycodone, which she reports helps her relax. Overall, the patient is coping with her recovery and the associated anxiety, but continuous monitoring is recommended.    Therapeutic Intervention:Therapist applied CBT/REBT, Exploration of Coping Skills, and Mindfulness Training a  - Pain management strategies, including the use of an Ace wrap, ice pack, brace, and massaging machines  - Setting alarms for medication times to ensure proper pain management  - Discussion of anxiety management   Clinical Maneuvering:  The benefits of a healthy diet and exercise were discussed with patient, especially the positive effects they have on mental health. Patient encouraged to consider lifestyle modification regarding  diet and exercise patterns to maximize results of mental health treatment.  Reviewed previous available documentation  Reviewed most recent available labs     Plan:  - Start home physical therapy on Friday afternoon after returning home  - Set alarms for medication times to ensure timely intake of pain medications  - Monitor anxiety levels and report any significant changes  - Ensure the knee bending machine fits properly; if not, a replacement will be provided    Justification for therapy: Patient continues to struggle with a chronic/pervasive mental illness which continues to cause impairment in at  least two important areas of functioning.  Patient appear(s) to maintain relative stability as compared to the  baseline measure.  Patient can reasonably be expected to continue to benefit from treatment and would likely be at increased risk for decompensation if treatment were stopped.  Patient endorses a positive benefit from therapy and appears to meet outpatient level of care.   Recommended Referrals: Psychiatrist/APRN and Medical Provider (PCP)    Follow Up:  Return in about 4 weeks, or earlier if symptoms worsen or fail to improve for next follow up visit. [phone]      Future Appointments         Provider Department Center    8/11/2025 2:00 PM (Arrive by 1:45 PM) Daniel Ziegler MD [facility]    8/19/2025 1:00 PM (Arrive by 12:45 PM) Haim Hill MD [facility]    9/3/2025 10:00 AM Yumiko Alejandre LCSW [facility]    10/8/2025 10:00 AM Yumiko Alejandre LCSW [facility]            This document electronically signed by Yumiko Miles LCSW, [facility] July 30, 2025 11:43 EDT    Patient or patient representative verbalized consent for the use of Ambient Listening during the visit with  Yumiko Miles LCSW for chart documentation. 7/30/2025  11:36 EDT    At [facility], we believe that sharing information builds trust and better relationships. You are receiving this note because you are receiving care at [facility] or have recently visited. It is possible you will see health information before a provider has talked with you about it. This kind of information can be easy to misunderstand as it is a medical document. It is intended as [id number] communication. It is written in medical language and may contain abbreviations or verbiage that are unfamiliar. It may appear blunt or direct. Medical documents  are intended to carry relevant information, facts as evident, and the clinical opinion of the practitioner.  To help you fully understand what it means for your health, we urge you to discuss this note with your provider

## 2025-08-19 ENCOUNTER — OFFICE VISIT (OUTPATIENT)
Dept: PSYCHIATRY | Facility: CLINIC | Age: 75
End: 2025-08-19
Payer: MEDICARE

## 2025-08-19 VITALS
WEIGHT: 160 LBS | BODY MASS INDEX: 30.21 KG/M2 | HEIGHT: 61 IN | SYSTOLIC BLOOD PRESSURE: 126 MMHG | HEART RATE: 79 BPM | DIASTOLIC BLOOD PRESSURE: 79 MMHG

## 2025-08-19 DIAGNOSIS — F34.1 DYSTHYMIC DISORDER: ICD-10-CM

## 2025-08-19 DIAGNOSIS — F41.1 GAD (GENERALIZED ANXIETY DISORDER): ICD-10-CM

## 2025-08-19 DIAGNOSIS — F51.01 PRIMARY INSOMNIA: ICD-10-CM

## 2025-08-19 PROCEDURE — 99214 OFFICE O/P EST MOD 30 MIN: CPT | Performed by: PSYCHIATRY & NEUROLOGY

## 2025-08-19 PROCEDURE — 3078F DIAST BP <80 MM HG: CPT | Performed by: PSYCHIATRY & NEUROLOGY

## 2025-08-19 PROCEDURE — 3074F SYST BP LT 130 MM HG: CPT | Performed by: PSYCHIATRY & NEUROLOGY

## 2025-08-19 RX ORDER — DULOXETIN HYDROCHLORIDE 60 MG/1
60 CAPSULE, DELAYED RELEASE ORAL DAILY
Qty: 90 CAPSULE | Refills: 0 | Status: SHIPPED | OUTPATIENT
Start: 2025-08-19

## 2025-08-19 RX ORDER — ESZOPICLONE 2 MG/1
2 TABLET, FILM COATED ORAL NIGHTLY PRN
Qty: 30 TABLET | Refills: 2 | Status: SHIPPED | OUTPATIENT
Start: 2025-08-19 | End: 2026-08-19

## 2025-08-19 RX ORDER — OXYCODONE HYDROCHLORIDE 5 MG/1
TABLET ORAL
COMMUNITY
Start: 2025-08-07